# Patient Record
Sex: MALE | Race: WHITE | NOT HISPANIC OR LATINO | Employment: OTHER | URBAN - METROPOLITAN AREA
[De-identification: names, ages, dates, MRNs, and addresses within clinical notes are randomized per-mention and may not be internally consistent; named-entity substitution may affect disease eponyms.]

---

## 2017-05-23 ENCOUNTER — OFFICE VISIT (OUTPATIENT)
Dept: CARDIOLOGY | Facility: CLINIC | Age: 42
End: 2017-05-23

## 2017-05-23 ENCOUNTER — HOSPITAL ENCOUNTER (OUTPATIENT)
Dept: CARDIOLOGY | Facility: HOSPITAL | Age: 42
Discharge: HOME | End: 2017-05-23
Attending: INTERNAL MEDICINE | Admitting: INTERNAL MEDICINE

## 2017-05-23 VITALS
RESPIRATION RATE: 20 BRPM | HEART RATE: 64 BPM | WEIGHT: 237 LBS | BODY MASS INDEX: 33.18 KG/M2 | DIASTOLIC BLOOD PRESSURE: 94 MMHG | SYSTOLIC BLOOD PRESSURE: 132 MMHG | HEIGHT: 71 IN

## 2017-05-23 DIAGNOSIS — Z82.49 FAMILY HISTORY OF CORONARY ARTERY DISEASE: ICD-10-CM

## 2017-05-23 DIAGNOSIS — I10 ESSENTIAL HYPERTENSION: ICD-10-CM

## 2017-05-23 DIAGNOSIS — R07.2 PRECORDIAL PAIN: ICD-10-CM

## 2017-05-23 DIAGNOSIS — R07.2 PRECORDIAL PAIN: Primary | ICD-10-CM

## 2017-05-23 LAB
BH CV STRESS BP STAGE 1: NORMAL
BH CV STRESS BP STAGE 2: NORMAL
BH CV STRESS BP STAGE 3: NORMAL
BH CV STRESS DURATION MIN STAGE 1: 3
BH CV STRESS DURATION MIN STAGE 2: 3
BH CV STRESS DURATION MIN STAGE 3: 2
BH CV STRESS DURATION SEC STAGE 1: 0
BH CV STRESS DURATION SEC STAGE 2: 0
BH CV STRESS DURATION SEC STAGE 3: 20
BH CV STRESS GRADE STAGE 1: 10
BH CV STRESS GRADE STAGE 2: 12
BH CV STRESS GRADE STAGE 3: 14
BH CV STRESS HR STAGE 1: 101
BH CV STRESS HR STAGE 2: 117
BH CV STRESS HR STAGE 3: 137
BH CV STRESS METS STAGE 1: 5
BH CV STRESS METS STAGE 2: 7.5
BH CV STRESS METS STAGE 3: 10
BH CV STRESS O2 STAGE 3: 99
BH CV STRESS PROTOCOL 1: NORMAL
BH CV STRESS RECOVERY BP: NORMAL MMHG
BH CV STRESS RECOVERY HR: 83 BPM
BH CV STRESS SPEED STAGE 1: 1.7
BH CV STRESS SPEED STAGE 2: 2.5
BH CV STRESS SPEED STAGE 3: 3.4
BH CV STRESS STAGE 1: 1
BH CV STRESS STAGE 2: 2
BH CV STRESS STAGE 3: 3
MAXIMAL PREDICTED HEART RATE: 179 BPM
PERCENT MAX PREDICTED HR: 76.54 %
STRESS BASELINE BP: NORMAL MMHG
STRESS BASELINE HR: 76 BPM
STRESS PERCENT HR: 90 %
STRESS POST ESTIMATED WORKLOAD: 9 METS
STRESS POST EXERCISE DUR MIN: 8 MIN
STRESS POST EXERCISE DUR SEC: 20 SEC
STRESS POST PEAK BP: NORMAL MMHG
STRESS POST PEAK HR: 137 BPM
STRESS TARGET HR: 152 BPM

## 2017-05-23 PROCEDURE — 99204 OFFICE O/P NEW MOD 45 MIN: CPT | Performed by: INTERNAL MEDICINE

## 2017-05-23 PROCEDURE — 93018 CV STRESS TEST I&R ONLY: CPT | Performed by: INTERNAL MEDICINE

## 2017-05-23 PROCEDURE — 93000 ELECTROCARDIOGRAM COMPLETE: CPT | Performed by: INTERNAL MEDICINE

## 2017-05-23 PROCEDURE — 93016 CV STRESS TEST SUPVJ ONLY: CPT | Performed by: INTERNAL MEDICINE

## 2017-05-23 PROCEDURE — 93017 CV STRESS TEST TRACING ONLY: CPT

## 2017-05-23 RX ORDER — ASPIRIN 325 MG
325 TABLET ORAL DAILY
COMMUNITY
End: 2018-07-03

## 2017-05-23 RX ORDER — OMEPRAZOLE 20 MG/1
20 CAPSULE, DELAYED RELEASE ORAL 2 TIMES DAILY
COMMUNITY
End: 2021-10-26

## 2017-05-23 RX ORDER — NITROGLYCERIN 0.4 MG/1
0.4 TABLET SUBLINGUAL
COMMUNITY
End: 2022-07-22 | Stop reason: HOSPADM

## 2017-05-23 RX ORDER — HYDROCODONE BITARTRATE AND ACETAMINOPHEN 5; 325 MG/1; MG/1
1 TABLET ORAL 2 TIMES DAILY PRN
COMMUNITY
End: 2018-07-03

## 2017-05-23 RX ORDER — TESTOSTERONE 16.2 MG/G
GEL TRANSDERMAL 2 TIMES DAILY
COMMUNITY
End: 2022-06-29 | Stop reason: SDUPTHER

## 2017-05-23 RX ORDER — SIMVASTATIN 40 MG
40 TABLET ORAL NIGHTLY
COMMUNITY
End: 2018-07-03

## 2017-05-23 RX ORDER — CHLORAL HYDRATE 500 MG
2000 CAPSULE ORAL 2 TIMES DAILY WITH MEALS
COMMUNITY
End: 2022-09-19 | Stop reason: ALTCHOICE

## 2017-05-30 ENCOUNTER — HOSPITAL ENCOUNTER (OUTPATIENT)
Dept: CARDIOLOGY | Facility: HOSPITAL | Age: 42
Discharge: HOME | End: 2017-05-30
Attending: INTERNAL MEDICINE | Admitting: INTERNAL MEDICINE

## 2017-05-30 DIAGNOSIS — I10 ESSENTIAL HYPERTENSION: ICD-10-CM

## 2017-05-30 DIAGNOSIS — Z82.49 FAMILY HISTORY OF CORONARY ARTERY DISEASE: ICD-10-CM

## 2017-05-30 DIAGNOSIS — R07.2 PRECORDIAL PAIN: ICD-10-CM

## 2017-05-30 LAB
BH CV NUCLEAR PRIOR STUDY: 3
BH CV STRESS BP STAGE 1: NORMAL
BH CV STRESS COMMENTS STAGE 1: NORMAL
BH CV STRESS DOSE REGADENOSON STAGE 1: 0.4
BH CV STRESS DURATION MIN STAGE 1: 0
BH CV STRESS DURATION SEC STAGE 1: 15
BH CV STRESS HR STAGE 1: 103
BH CV STRESS PROTOCOL 1: NORMAL
BH CV STRESS RECOVERY BP: NORMAL MMHG
BH CV STRESS RECOVERY HR: 79 BPM
BH CV STRESS STAGE 1: 1
LV EF NUC BP: 67 %
MAXIMAL PREDICTED HEART RATE: 179 BPM
PERCENT MAX PREDICTED HR: 57.54 %
STRESS BASELINE BP: NORMAL MMHG
STRESS BASELINE HR: 64 BPM
STRESS PERCENT HR: 68 %
STRESS POST EXERCISE DUR SEC: 15 SEC
STRESS POST PEAK BP: NORMAL MMHG
STRESS POST PEAK HR: 103 BPM
STRESS TARGET HR: 152 BPM

## 2017-05-30 PROCEDURE — 0 TECHNETIUM TETROFOSMIN KIT: Performed by: INTERNAL MEDICINE

## 2017-05-30 PROCEDURE — 93016 CV STRESS TEST SUPVJ ONLY: CPT | Performed by: INTERNAL MEDICINE

## 2017-05-30 PROCEDURE — 78452 HT MUSCLE IMAGE SPECT MULT: CPT

## 2017-05-30 PROCEDURE — 93018 CV STRESS TEST I&R ONLY: CPT | Performed by: INTERNAL MEDICINE

## 2017-05-30 PROCEDURE — 93017 CV STRESS TEST TRACING ONLY: CPT

## 2017-05-30 PROCEDURE — 78452 HT MUSCLE IMAGE SPECT MULT: CPT | Performed by: INTERNAL MEDICINE

## 2017-05-30 PROCEDURE — A9502 TC99M TETROFOSMIN: HCPCS | Performed by: INTERNAL MEDICINE

## 2017-05-30 PROCEDURE — 25010000002 REGADENOSON 0.4 MG/5ML SOLUTION: Performed by: INTERNAL MEDICINE

## 2017-05-30 RX ADMIN — TETROFOSMIN 1 DOSE: 1.38 INJECTION, POWDER, LYOPHILIZED, FOR SOLUTION INTRAVENOUS at 12:25

## 2017-05-30 RX ADMIN — REGADENOSON 0.4 MG: 0.08 INJECTION, SOLUTION INTRAVENOUS at 12:20

## 2017-05-30 RX ADMIN — TETROFOSMIN 1 DOSE: 1.38 INJECTION, POWDER, LYOPHILIZED, FOR SOLUTION INTRAVENOUS at 11:30

## 2017-06-20 ENCOUNTER — OFFICE VISIT (OUTPATIENT)
Dept: CARDIOLOGY | Facility: CLINIC | Age: 42
End: 2017-06-20

## 2017-06-20 VITALS
HEART RATE: 72 BPM | BODY MASS INDEX: 32.34 KG/M2 | SYSTOLIC BLOOD PRESSURE: 112 MMHG | DIASTOLIC BLOOD PRESSURE: 82 MMHG | WEIGHT: 231 LBS | HEIGHT: 71 IN

## 2017-06-20 DIAGNOSIS — E78.1 HYPERTRIGLYCERIDEMIA: ICD-10-CM

## 2017-06-20 DIAGNOSIS — Z82.49 FAMILY HISTORY OF CORONARY ARTERY DISEASE: ICD-10-CM

## 2017-06-20 DIAGNOSIS — R07.2 PRECORDIAL PAIN: Primary | ICD-10-CM

## 2017-06-20 PROCEDURE — 99213 OFFICE O/P EST LOW 20 MIN: CPT | Performed by: INTERNAL MEDICINE

## 2017-06-22 NOTE — PROGRESS NOTES
Date of Office Visit: 2017  Encounter Provider: Dayron Adames MD  Place of Service: [facility] CARDIOLOGY  Patient Name: Heber Harley  :1975    Chief complaint: Follow-up for chest pain, hypertriglyceridemia, family history   of CAD.    History of Present Illness:    Dear Dr. Flores:    I again had the pleasure of seeing your patient in cardiology office on 2017.  As you   well know, he is a very pleasant 42 year-old white male with a past medical history   significant for hypertension, hypertriglyceridemia, and a family history of coronary artery   disease who presents for follow-up.  The patient initially saw me on 2017.  He had   been having approximately 6 weeks of pressure in the center of his chest radiating   across his precordium and into his left arm occasionally.  He did state that it occurred   fairly randomly, but was a clear change from his baseline.  He has a significant family   history of coronary artery disease with his father having an MI and bypass surgery in   his upper 50s, and his mother having a stent at an unknown age.  He also has a   significant history of hypertriglyceridemia, with a level of 660 on 2017.  However, he   had been off of his Zocor and fenofibrate at the time, and subsequently restarted these.   He initially underwent an exercise treadmill stress test on 2017, although he only   achieved 76% of his target heart rate secondary to fatigue and shortness of breath (he   exercised for 8 minutes).  He then underwent a Lexiscan Myoview stress test on   2017 which was normal with no evidence of ischemia, and an ejection fraction of   67%.    The patient presents today for follow-up.  Overall, he is doing fairly well.  He actually was   placed on omeprazole twice a day, and his chest pain has almost completely resolved.    At this point, he feels that it was likely secondary to reflux.  He has had no new  exertional   symptoms.  He continues to take aspirin and his lipid lowering medications.    Past Medical History:   Diagnosis Date   • GERD (gastroesophageal reflux disease)    • Hyperlipidemia    • Hypertension    • Hypertriglyceridemia    • Sleep apnea     On CPAP       Past Surgical History:   Procedure Laterality Date   • ANKLE SURGERY         Current Outpatient Prescriptions on File Prior to Visit   Medication Sig Dispense Refill   • aspirin 325 MG tablet Take 325 mg by mouth Daily.     • fenofibrate 160 MG tablet Take  by mouth Daily.     • HYDROcodone-acetaminophen (NORCO) 5-325 MG per tablet Take 1 tablet by mouth 2 (Two) Times a Day As Needed.     • lisinopril (PRINIVIL,ZESTRIL) 10 MG tablet Take  by mouth Daily.     • nitroglycerin (NITROSTAT) 0.4 MG SL tablet Place 0.4 mg under the tongue Every 5 (Five) Minutes As Needed for Chest Pain. Take no more than 3 doses in 15 minutes.     • Omega-3 Fatty Acids (FISH OIL) 1000 MG capsule capsule Take 2,000 mg by mouth 2 (Two) Times a Day With Meals.     • omeprazole (priLOSEC) 20 MG capsule Take 20 mg by mouth 2 (Two) Times a Day.     • simvastatin (ZOCOR) 40 MG tablet Take 40 mg by mouth Every Night.     • Testosterone (ANDROGEL PUMP) 20.25 MG/ACT (1.62%) gel Place  on the skin 2 (Two) Times a Day.     • Testosterone 30 MG/ACT solution 1 PUMP ON EACH AXILLA DAILY 90 mL 5     No current facility-administered medications on file prior to visit.      Allergies as of 06/20/2017   • (No Known Allergies)     Social History     Social History   • Marital status:      Spouse name: N/A   • Number of children: N/A   • Years of education: N/A     Occupational History   • Not on file.     Social History Main Topics   • Smoking status: Former Smoker     Years: 5.00     Quit date: 2015   • Smokeless tobacco: Current User      Comment: Caffeine - 2 daily   • Alcohol use 7.2 oz/week     12 Cans of beer per week   • Drug use: 7.00 per week     Special: Marijuana       "Comment: daily   • Sexual activity: Not on file     Other Topics Concern   • Not on file     Social History Narrative     Family History   Problem Relation Age of Onset   • Hypertension Mother    • Coronary artery disease Mother      Mother with stent   • Heart disease Father      Father with MI and 3 vessel CABG in upper 50's   • Hypertension Father    • Diabetes Maternal Uncle    • Cancer Maternal Uncle    • Cancer Paternal Grandfather        Review of Systems   Constitution: Positive for malaise/fatigue.   Cardiovascular: Positive for dyspnea on exertion.   Respiratory: Positive for snoring.    All other systems reviewed and are negative.    Objective:     Vitals:    06/20/17 1049   BP: 112/82   Pulse: 72   Weight: 231 lb (105 kg)   Height: 71\" (180.3 cm)     Body mass index is 32.22 kg/(m^2).    Physical Exam   Constitutional: He is oriented to person, place, and time. He appears well-developed and well-nourished.   HENT:   Head: Normocephalic and atraumatic.   Eyes: Conjunctivae are normal.   Neck: Neck supple.   Cardiovascular: Normal rate and regular rhythm.  Exam reveals no gallop and no friction rub.    No murmur heard.  Pulmonary/Chest: Effort normal and breath sounds normal.   Abdominal: Soft. There is no tenderness.   Musculoskeletal: He exhibits no edema.   Neurological: He is alert and oriented to person, place, and time.   Skin: Skin is warm.   Psychiatric: He has a normal mood and affect. His behavior is normal.     Lab Review:   Procedures    Cardiac Procedures:  1.  Exercise treadmill stress test on 5/23/2017: The patient exercised for 8 minutes, but   could not achieve his target heart rate secondary to fatigue and shortness of breath.  2.  Lexiscan Myoview stress test on 5/30/2017: Normal with no evidence of ischemia or   infarction.  The ejection fraction was 67%.    Assessment:       Diagnosis Plan   1. Precordial pain     2. Hypertriglyceridemia     3. Family history of coronary artery disease "       Plan:       As noted, the patient's chest discomfort has almost completely resolved since the initiation   of omeprazole twice a day.  He very much feels like this was a reflux related issue.  The   nuclear stress test showed no evidence of ischemia.  However, he does have a significant   family history of coronary artery disease, as well as significant hypertriglyceridemia.  I do   feel the risk factor modification is going to be very important for him.  He is going to continue   on aspirin, as well as fenofibrate and Zocor.  He does have his lipid panel checked   periodically.  His blood pressure is excellent 112/82, and he will remain on the lisinopril as   well.  I will see him back in the office in one year unless other issues arise.

## 2018-07-03 ENCOUNTER — APPOINTMENT (OUTPATIENT)
Dept: GENERAL RADIOLOGY | Facility: HOSPITAL | Age: 43
End: 2018-07-03

## 2018-07-03 ENCOUNTER — HOSPITAL ENCOUNTER (EMERGENCY)
Facility: HOSPITAL | Age: 43
Discharge: HOME | End: 2018-07-03
Attending: EMERGENCY MEDICINE | Admitting: EMERGENCY MEDICINE

## 2018-07-03 VITALS
WEIGHT: 315 LBS | DIASTOLIC BLOOD PRESSURE: 81 MMHG | SYSTOLIC BLOOD PRESSURE: 110 MMHG | OXYGEN SATURATION: 98 % | BODY MASS INDEX: 44.1 KG/M2 | HEIGHT: 71 IN | TEMPERATURE: 97.8 F | RESPIRATION RATE: 18 BRPM | HEART RATE: 67 BPM

## 2018-07-03 DIAGNOSIS — S61.313A LACERATION OF LEFT MIDDLE FINGER WITHOUT FOREIGN BODY WITH DAMAGE TO NAIL, INITIAL ENCOUNTER: Primary | ICD-10-CM

## 2018-07-03 PROCEDURE — 99283 EMERGENCY DEPT VISIT LOW MDM: CPT

## 2018-07-03 PROCEDURE — 90715 TDAP VACCINE 7 YRS/> IM: CPT | Performed by: NURSE PRACTITIONER

## 2018-07-03 PROCEDURE — 90471 IMMUNIZATION ADMIN: CPT | Performed by: NURSE PRACTITIONER

## 2018-07-03 PROCEDURE — 25010000002 TDAP 5-2.5-18.5 LF-MCG/0.5 SUSPENSION: Performed by: NURSE PRACTITIONER

## 2018-07-03 PROCEDURE — 73140 X-RAY EXAM OF FINGER(S): CPT

## 2018-07-03 RX ORDER — HYDROCODONE BITARTRATE AND ACETAMINOPHEN 7.5; 325 MG/1; MG/1
1 TABLET ORAL ONCE
Status: COMPLETED | OUTPATIENT
Start: 2018-07-03 | End: 2018-07-03

## 2018-07-03 RX ORDER — BUPIVACAINE HYDROCHLORIDE 5 MG/ML
10 INJECTION, SOLUTION EPIDURAL; INTRACAUDAL ONCE
Status: COMPLETED | OUTPATIENT
Start: 2018-07-03 | End: 2018-07-03

## 2018-07-03 RX ORDER — CEPHALEXIN 500 MG/1
500 CAPSULE ORAL 3 TIMES DAILY
Qty: 21 CAPSULE | Refills: 0 | Status: SHIPPED | OUTPATIENT
Start: 2018-07-03 | End: 2021-11-16 | Stop reason: SDUPTHER

## 2018-07-03 RX ADMIN — TETANUS TOXOID, REDUCED DIPHTHERIA TOXOID AND ACELLULAR PERTUSSIS VACCINE, ADSORBED 0.5 ML: 5; 2.5; 8; 8; 2.5 SUSPENSION INTRAMUSCULAR at 11:27

## 2018-07-03 RX ADMIN — HYDROCODONE BITARTRATE AND ACETAMINOPHEN 1 TABLET: 7.5; 325 TABLET ORAL at 10:54

## 2018-07-03 RX ADMIN — BUPIVACAINE HYDROCHLORIDE 10 ML: 5 INJECTION, SOLUTION EPIDURAL; INTRACAUDAL at 13:18

## 2018-07-03 NOTE — ED PROVIDER NOTES
The BHARTI and I have discussed this patient's history, physical exam, and treatment plan.  I have reviewed the documentation and personally had a face to face interaction with the patient. I affirm the documentation and agree with the treatment and plan.  The attached note describes my personal findings.    CC: Finger laceration  HPI:  Pt presents to ED c/o left 3rd finger laceration which occurred around 1000 today while pt was using a table saw. Pt admits to tingling to all his L fingers. Tetanus shot is not UTD.     PEx:  On exam, pt is alert, awake, in no acute distress, laceration of the tip of third L finger that extends to distal edge of nail, FROM of L fingers. XR showed a tiny fracture of the distal tuft of the third L finger.    Progress and Consults:  Notified pt that Tone Randolph PA-C will be performing a laceration repair and administering Tdap. Pt understands and agrees with the plan, all questions answered.      Documentation assistance provided by pidead Novak for Dr. Han.  Information recorded by the scribe was done at my direction and has been verified and validated by me.       Julio Cesar Novak  07/03/18 1235       Mayito Han MD  07/03/18 [id number]

## 2018-07-03 NOTE — ED NOTES
Tube dressing applied to repaired laceration, tolerated well     Joanna Farooq, JEM  07/03/18 4218

## 2018-07-03 NOTE — ED PROVIDER NOTES
EMERGENCY DEPARTMENT ENCOUNTER    Room Number:  33/33  Date seen:  7/3/2018  Time seen: 11:37 AM  PCP: Anthony Flores MD  Historian: Pt      HPI:  Chief complaint: Left 3rd finger  Context: Heber Harley is a 43 y.o. male who presents to the ED c/o left 3rd finger laceration earlier today when he was using a table saw. He states the rest of his fingers are tingling.          MEDICAL RECORD REVIEW         ALLERGIES  Patient has no known allergies.    PAST MEDICAL HISTORY  Active Ambulatory Problems     Diagnosis Date Noted   • No Active Ambulatory Problems     Resolved Ambulatory Problems     Diagnosis Date Noted   • No Resolved Ambulatory Problems     Past Medical History:   Diagnosis Date   • GERD (gastroesophageal reflux disease)    • Hyperlipidemia    • Hypertension    • Hypertriglyceridemia    • Sleep apnea        PAST SURGICAL HISTORY  Past Surgical History:   Procedure Laterality Date   • ANKLE SURGERY         FAMILY HISTORY  Family History   Problem Relation Age of Onset   • Hypertension Mother    • Coronary artery disease Mother         Mother with stent   • Heart disease Father         Father with MI and 3 vessel CABG in upper 50's   • Hypertension Father    • Diabetes Maternal Uncle    • Cancer Maternal Uncle    • Cancer Paternal Grandfather        SOCIAL HISTORY  Social History     Social History   • Marital status:      Spouse name: N/A   • Number of children: N/A   • Years of education: N/A     Occupational History   • Not on file.     Social History Main Topics   • Smoking status: Former Smoker     Years: 5.00     Quit date: 2015   • Smokeless tobacco: Current User      Comment: Caffeine - 2 daily   • Alcohol use 7.2 oz/week     12 Cans of beer per week   • Drug use: Yes     Frequency: 7.0 times per week     Types: Marijuana      Comment: daily   • Sexual activity: Not on file     Other Topics Concern   • Not on file     Social History Narrative   • No narrative on file           REVIEW OF  SYSTEMS  Review of Systems   Constitutional: Negative for activity change, appetite change and fever.   HENT: Negative for congestion and sore throat.    Respiratory: Negative for cough and shortness of breath.    Cardiovascular: Negative for chest pain and leg swelling.   Skin: Positive for wound (Left 3rd finger laceration). Negative for rash.   Neurological: Negative for weakness, numbness and headaches.   All other systems reviewed and are negative.          PHYSICAL EXAM  ED Triage Vitals   Temp Heart Rate Resp BP SpO2   07/03/18 1024 07/03/18 1022 07/03/18 1040 07/03/18 1041 07/03/18 1022   97.7 °F (36.5 °C) 79 20 (!) 137/110 97 %      Temp src Heart Rate Source Patient Position BP Location FiO2 (%)   07/03/18 1024 07/03/18 1022 -- -- --   Tympanic Monitor        Physical Exam   Constitutional: No distress.   HENT:   Head: Normocephalic and atraumatic.   Eyes: EOM are normal.   Neck: Normal range of motion.   Pulmonary/Chest: No respiratory distress.   Abdominal: There is no tenderness.   Musculoskeletal: He exhibits no edema.   Laceration to left middle finger with some nail involvement   Neurological: He is alert.   Skin: Skin is warm and dry.   Nursing note and vitals reviewed.        RADIOLOGY  XR Finger 2+ View Left             I ordered the above noted radiological studies and reviewed the images on the PACS system.     MEDICATIONS GIVEN IN ER  Medications   HYDROcodone-acetaminophen (NORCO) 7.5-325 MG per tablet 1 tablet (1 tablet Oral Given 7/3/18 1054)   Tdap (BOOSTRIX) injection 0.5 mL (0.5 mL Intramuscular Given 7/3/18 1127)   bupivacaine (PF) (MARCAINE) 0.5 % injection 10 mL (10 mL Injection Given by Other 7/3/18 1318)             PROCEDURES  Laceration Repair  Date/Time: 7/3/2018 12:36 PM  Performed by: KRISS LAW  Authorized by: ARPIT GOMEZ     Consent:     Consent obtained:  Verbal    Consent given by:  Patient  Anesthesia (see MAR for exact dosages):     Anesthesia method:  Nerve  block    Block location:  Left 3rd finger    Block needle gauge:  27 G    Block anesthetic:  Bupivacaine 0.5% w/o epi    Block injection procedure:  Anatomic landmarks identified and anatomic landmarks palpated    Block outcome:  Anesthesia achieved  Laceration details:     Location:  Finger    Finger location:  L long finger    Length (cm):  3.1  Repair type:     Repair type:  Intermediate  Pre-procedure details:     Preparation:  Patient was prepped and draped in usual sterile fashion  Exploration:     Hemostasis achieved with:  Direct pressure    Wound exploration: wound explored through full range of motion    Treatment:     Area cleansed with:  Hibiclens and saline    Amount of cleaning:  Standard    Irrigation solution:  Sterile saline and sterile water    Irrigation volume:  Moderate    Irrigation method:  Syringe  Skin repair:     Repair method:  Sutures    Suture size:  5-0    Suture material:  Nylon    Suture technique:  Simple interrupted    Number of sutures:  9  Approximation:     Approximation:  Close  Post-procedure details:     Dressing:  Antibiotic ointment, adhesive bandage and sterile dressing    Patient tolerance of procedure:  Tolerated well, no immediate complications            COURSE & MEDICAL DECISION MAKING  Pertinent Labs and Imaging studies that were ordered and reviewed are noted above.  Results were reviewed/discussed with the patient and they were also made aware of online assess.  Pt also made aware that some labs, such as cultures, will not be resulted during ER visit and follow up with PMD is necessary.         PROGRESS AND CONSULTS    Progress Notes:    ED Course as of Jul 03 1917   Tue Jul 03, 2018   1050 Pt cut left middle finger on table saw PTA.  Pt unsure of when last Tetanus shot was. R hand dominant  Exam: laceration to tip of left middle finger. Normal flexion & extension. Normal cap refill and sensation. Left radial pulse 2+.  Bleeding controled at this time.  [MS]      ED  "Course User Index  [MS] Marisol Haynespaty, APRN       1255   Reviewed pt's history and workup with Dr. Han (ER physician).  After a bedside evaluation, Dr. Han agrees with the plan of care    1305  BP- (!) 137/110 HR- 79 Temp- 97.7 °F (36.5 °C) (Tympanic) O2 sat- 97%  Rechecked the patient who is in NAD and is resting comfortably. Informed pt of successful laceeration repair and the plan for discharge. I informed pt that flap may or may not take and that he is to follow up with (Hand surgery).Pt understands and agrees with the plan, all questions answered.    1309  The patient's history, physical exam, and lab findings were discussed with the physician, who also performed a face to face history and physical exam.  I discussed all results and noted any abnormalities with patient.  Discussed absoute need to recheck abnormalities with their family physician.  I answered any of the patient's questions.  Discussed plan for discharge, as there is no emergent indication for admission.  Pt is agreeable and understands need for follow up and repeat testing.  Pt is aware that discharge does not mean that nothing is wrong but it indicates no emergency is present and they must continue care with their family physician.  Pt is discharged with instructions to follow up with primary care doctor to have their blood pressure rechecked.           Disposition vitals:  /81 (BP Location: Right arm, Patient Position: Sitting)   Pulse 67   Temp 97.8 °F (36.6 °C) (Oral)   Resp 18   Ht 180.3 cm (71\")   Wt (!) 147 kg (325 lb)   SpO2 98%   BMI 45.33 kg/m²         DIAGNOSIS  Final diagnoses:   Laceration of left middle finger without foreign body with damage to nail, initial encounter         DISPOSITION  DISCHARGE    Patient discharged in stable condition.    Reviewed implications of results, diagnosis, meds, responsibility to follow up, warning signs and symptoms of possible worsening, potential " complications and reasons to return to ER.    Patient/Family voiced understanding of above instructions.    Discussed plan for discharge, as there is no emergent indication for admission. Patient referred to primary care provider for BP management due to today's BP. Pt/family is agreeable and understands need for follow up and repeat testing.  Pt is aware that discharge does not mean that nothing is wrong but it indicates no emergency is present that requires admission and they must continue care with follow-up as given below or physician of their choice.     FOLLOW-UP  Kevan Collins MD  [address] [address]  [phone]    Call   for suture removal in 10-14 days         Medication List      New Prescriptions    cephalexin 500 MG capsule  Commonly known as:  KEFLEX  Take 1 capsule by mouth 3 (Three) Times a Day.        Stop    aspirin 325 MG tablet     HYDROcodone-acetaminophen 5-325 MG per tablet  Commonly known as:  NORCO     simvastatin 40 MG tablet  Commonly known as:  ZOCOR                FOLLOW UP   Kevan Collins MD  [address] [address]  [phone]    Call   for suture removal in 10-14 days          RX     Medication List      New Prescriptions    cephalexin 500 MG capsule  Commonly known as:  KEFLEX  Take 1 capsule by mouth 3 (Three) Times a Day.        Stop    aspirin 325 MG tablet     HYDROcodone-acetaminophen 5-325 MG per tablet  Commonly known as:  NORCO     simvastatin 40 MG tablet  Commonly known as:  ZOCOR            Documentation assistance provided by piedad Wooten for Tone Randolph PA-C.  Information recorded by the scribe was done at my direction and has been verified and validated by me.             Lito Wooten  07/03/18 1310       RAVI Nicole  07/03/18 5207

## 2018-08-05 ENCOUNTER — HOSPITAL ENCOUNTER (EMERGENCY)
Facility: HOSPITAL | Age: 43
Discharge: HOME | End: 2018-08-05
Attending: EMERGENCY MEDICINE | Admitting: EMERGENCY MEDICINE

## 2018-08-05 VITALS
DIASTOLIC BLOOD PRESSURE: 76 MMHG | BODY MASS INDEX: 34.07 KG/M2 | TEMPERATURE: 97.7 F | HEART RATE: 74 BPM | HEIGHT: 69 IN | RESPIRATION RATE: 16 BRPM | OXYGEN SATURATION: 97 % | SYSTOLIC BLOOD PRESSURE: 118 MMHG | WEIGHT: 230 LBS

## 2018-08-05 DIAGNOSIS — R42 LIGHTHEADEDNESS: ICD-10-CM

## 2018-08-05 DIAGNOSIS — R11.2 NON-INTRACTABLE VOMITING WITH NAUSEA, UNSPECIFIED VOMITING TYPE: Primary | ICD-10-CM

## 2018-08-05 LAB
ALBUMIN SERPL-MCNC: 4.9 G/DL (ref 3.5–5.2)
ALBUMIN/GLOB SERPL: 2 G/DL
ALP SERPL-CCNC: 54 U/L (ref 39–117)
ALT SERPL W P-5'-P-CCNC: 98 U/L (ref 1–41)
ANION GAP SERPL CALCULATED.3IONS-SCNC: 14.1 MMOL/L
AST SERPL-CCNC: 59 U/L (ref 1–40)
BASOPHILS # BLD AUTO: 0.05 10*3/MM3 (ref 0–0.2)
BASOPHILS NFR BLD AUTO: 0.5 % (ref 0–1.5)
BILIRUB SERPL-MCNC: 0.5 MG/DL (ref 0.1–1.2)
BILIRUB UR QL STRIP: NEGATIVE
BUN BLD-MCNC: 8 MG/DL (ref 6–20)
BUN/CREAT SERPL: 6.9 (ref 7–25)
CALCIUM SPEC-SCNC: 9.8 MG/DL (ref 8.6–10.5)
CHLORIDE SERPL-SCNC: 104 MMOL/L (ref 98–107)
CLARITY UR: ABNORMAL
CO2 SERPL-SCNC: 25.9 MMOL/L (ref 22–29)
COLOR UR: YELLOW
CREAT BLD-MCNC: 1.16 MG/DL (ref 0.76–1.27)
DEPRECATED RDW RBC AUTO: 43.4 FL (ref 37–54)
EOSINOPHIL # BLD AUTO: 0.16 10*3/MM3 (ref 0–0.7)
EOSINOPHIL NFR BLD AUTO: 1.6 % (ref 0.3–6.2)
ERYTHROCYTE [DISTWIDTH] IN BLOOD BY AUTOMATED COUNT: 13 % (ref 11.5–14.5)
GFR SERPL CREATININE-BSD FRML MDRD: 69 ML/MIN/1.73
GLOBULIN UR ELPH-MCNC: 2.5 GM/DL
GLUCOSE BLD-MCNC: 107 MG/DL (ref 65–99)
GLUCOSE UR STRIP-MCNC: NEGATIVE MG/DL
HCT VFR BLD AUTO: 46.5 % (ref 40.4–52.2)
HGB BLD-MCNC: 15.7 G/DL (ref 13.7–17.6)
HGB UR QL STRIP.AUTO: NEGATIVE
IMM GRANULOCYTES # BLD: 0.03 10*3/MM3 (ref 0–0.03)
IMM GRANULOCYTES NFR BLD: 0.3 % (ref 0–0.5)
KETONES UR QL STRIP: NEGATIVE
LEUKOCYTE ESTERASE UR QL STRIP.AUTO: NEGATIVE
LIPASE SERPL-CCNC: 21 U/L (ref 13–60)
LYMPHOCYTES # BLD AUTO: 1.23 10*3/MM3 (ref 0.9–4.8)
LYMPHOCYTES NFR BLD AUTO: 12.5 % (ref 19.6–45.3)
MCH RBC QN AUTO: 30.8 PG (ref 27–32.7)
MCHC RBC AUTO-ENTMCNC: 33.8 G/DL (ref 32.6–36.4)
MCV RBC AUTO: 91.2 FL (ref 79.8–96.2)
MONOCYTES # BLD AUTO: 0.72 10*3/MM3 (ref 0.2–1.2)
MONOCYTES NFR BLD AUTO: 7.3 % (ref 5–12)
NEUTROPHILS # BLD AUTO: 7.71 10*3/MM3 (ref 1.9–8.1)
NEUTROPHILS NFR BLD AUTO: 78.1 % (ref 42.7–76)
NITRITE UR QL STRIP: NEGATIVE
PH UR STRIP.AUTO: >=9 [PH] (ref 5–8)
PLATELET # BLD AUTO: 241 10*3/MM3 (ref 140–500)
PMV BLD AUTO: 10.9 FL (ref 6–12)
POTASSIUM BLD-SCNC: 4.4 MMOL/L (ref 3.5–5.2)
PROT SERPL-MCNC: 7.4 G/DL (ref 6–8.5)
PROT UR QL STRIP: NEGATIVE
RBC # BLD AUTO: 5.1 10*6/MM3 (ref 4.6–6)
SODIUM BLD-SCNC: 144 MMOL/L (ref 136–145)
SP GR UR STRIP: 1.02 (ref 1–1.03)
UROBILINOGEN UR QL STRIP: ABNORMAL
WBC NRBC COR # BLD: 9.87 10*3/MM3 (ref 4.5–10.7)

## 2018-08-05 PROCEDURE — 96361 HYDRATE IV INFUSION ADD-ON: CPT

## 2018-08-05 PROCEDURE — 96374 THER/PROPH/DIAG INJ IV PUSH: CPT

## 2018-08-05 PROCEDURE — 83690 ASSAY OF LIPASE: CPT | Performed by: EMERGENCY MEDICINE

## 2018-08-05 PROCEDURE — 81003 URINALYSIS AUTO W/O SCOPE: CPT | Performed by: EMERGENCY MEDICINE

## 2018-08-05 PROCEDURE — 85025 COMPLETE CBC W/AUTO DIFF WBC: CPT | Performed by: EMERGENCY MEDICINE

## 2018-08-05 PROCEDURE — 25010000002 ONDANSETRON PER 1 MG: Performed by: EMERGENCY MEDICINE

## 2018-08-05 PROCEDURE — 80053 COMPREHEN METABOLIC PANEL: CPT | Performed by: EMERGENCY MEDICINE

## 2018-08-05 PROCEDURE — 99284 EMERGENCY DEPT VISIT MOD MDM: CPT

## 2018-08-05 RX ORDER — ONDANSETRON 2 MG/ML
4 INJECTION INTRAMUSCULAR; INTRAVENOUS ONCE
Status: COMPLETED | OUTPATIENT
Start: 2018-08-05 | End: 2018-08-05

## 2018-08-05 RX ORDER — ONDANSETRON 4 MG/1
4 TABLET, ORALLY DISINTEGRATING ORAL EVERY 8 HOURS PRN
Qty: 15 TABLET | Refills: 0 | Status: SHIPPED | OUTPATIENT
Start: 2018-08-05 | End: 2018-08-10

## 2018-08-05 RX ORDER — SODIUM CHLORIDE 0.9 % (FLUSH) 0.9 %
10 SYRINGE (ML) INJECTION AS NEEDED
Status: DISCONTINUED | OUTPATIENT
Start: 2018-08-05 | End: 2018-08-05 | Stop reason: HOSPADM

## 2018-08-05 RX ADMIN — ONDANSETRON HYDROCHLORIDE 4 MG: 2 INJECTION, SOLUTION INTRAMUSCULAR; INTRAVENOUS at 14:09

## 2018-08-05 RX ADMIN — SODIUM CHLORIDE, POTASSIUM CHLORIDE, SODIUM LACTATE AND CALCIUM CHLORIDE 1000 ML: 600; 310; 30; 20 INJECTION, SOLUTION INTRAVENOUS at 14:09

## 2018-08-05 NOTE — ED PROVIDER NOTES
EMERGENCY DEPARTMENT ENCOUNTER    Room Number:  27/27  Date seen:  8/5/2018  Time seen: 1:44 PM  PCP: Anthony Flores MD  Historian: Pt      HPI:  Chief Complaint: Vomiting  A complete HPI/ROS/PMH/PSH/SH/FH are unobtainable due to: n/a  Context: Heber Harley is a 43 y.o. male who presents to the ED c/o vomiting that began 2 hrs ago. Pt c/o dizziness when standing and lower abd pain. Pt describes dizziness as wobbly and lightheaded. Lightheadedness occurs only with standing.  Pt denies hematemesis, fever, cough, CP, SOA, diarrhea, constipation, and dysuria. Pt has h/o HTN, hyperlipidemia, and sleep apnea.     Quality: vomiting  Intensity/Severity: moderate  Duration: since 2hrs ago  Timing: intermittent  Progression: worsening  Treatment before arrival: none stated  Associated Symptoms: dizziness and lower abd pain    PAST MEDICAL HISTORY  Active Ambulatory Problems     Diagnosis Date Noted   • No Active Ambulatory Problems     Resolved Ambulatory Problems     Diagnosis Date Noted   • No Resolved Ambulatory Problems     Past Medical History:   Diagnosis Date   • GERD (gastroesophageal reflux disease)    • Hyperlipidemia    • Hypertension    • Hypertriglyceridemia    • Sleep apnea          PAST SURGICAL HISTORY  Past Surgical History:   Procedure Laterality Date   • ANKLE SURGERY           FAMILY HISTORY  Family History   Problem Relation Age of Onset   • Hypertension Mother    • Coronary artery disease Mother         Mother with stent   • Heart disease Father         Father with MI and 3 vessel CABG in upper 50's   • Hypertension Father    • Diabetes Maternal Uncle    • Cancer Maternal Uncle    • Cancer Paternal Grandfather          SOCIAL HISTORY  Social History     Social History   • Marital status:      Spouse name: N/A   • Number of children: N/A   • Years of education: N/A     Occupational History   • Not on file.     Social History Main Topics   • Smoking status: Former Smoker     Years: 5.00      Quit date: 2015   • Smokeless tobacco: Current User      Comment: Caffeine - 2 daily   • Alcohol use 7.2 oz/week     12 Cans of beer per week   • Drug use: Yes     Frequency: 7.0 times per week     Types: Marijuana      Comment: daily   • Sexual activity: Not on file     Other Topics Concern   • Not on file     Social History Narrative   • No narrative on file         ALLERGIES  Patient has no known allergies.        REVIEW OF SYSTEMS  Review of Systems   Constitutional: Negative for fever.   HENT: Negative for sore throat.    Respiratory: Negative for cough and shortness of breath.    Cardiovascular: Negative for chest pain.   Gastrointestinal: Positive for abdominal pain (lower) and vomiting (denies hematemesis). Negative for constipation and diarrhea.   Endocrine: Negative for polyuria.   Genitourinary: Negative for dysuria.   Musculoskeletal: Negative for neck pain.   Skin: Negative for rash.   Neurological: Positive for dizziness (wobby and lightheaded). Negative for headaches.   All other systems reviewed and are negative.           PHYSICAL EXAM  ED Triage Vitals   Temp Heart Rate Resp BP SpO2   08/05/18 1317 08/05/18 1317 08/05/18 1317 08/05/18 1332 08/05/18 1317   97.5 °F (36.4 °C) 85 16 122/95 100 %      Temp src Heart Rate Source Patient Position BP Location FiO2 (%)   08/05/18 1317 -- -- -- --   Tympanic             GENERAL: uncomfortable, nontoxic  HENT: nares patent, mucous membranes slightly dry  EYES: no scleral icterus  CV: regular rhythm, regular rate  RESPIRATORY: normal effort, CTAB  ABDOMEN: soft, no CVA tenderness, lower abd tenderness, no rebound or guarding  MUSCULOSKELETAL: no deformity  NEURO:   Recent and remote memory functions are normal. The patient is attentive with normal concentration. Language is fluent. Speech is clear. The speech is non-dysarthric. Fund of knowledge is normal.   Symmetric smile with no facial droop.  Eyes close shut strongly bilaterally.  Symmetric eyebrow raise  bilaterally.  EOMI, PERRL  CN II-XII grossly normal otherwise.  5/5 strength to extremities.  No pronator drift.  Intact FNF.  No meningismus.     SKIN: warm, dry    Vital signs and nursing notes reviewed.          LAB RESULTS  Recent Results (from the past 24 hour(s))   Comprehensive Metabolic Panel    Collection Time: 08/05/18  2:08 PM   Result Value Ref Range    Glucose 107 (H) 65 - 99 mg/dL    BUN 8 6 - 20 mg/dL    Creatinine 1.16 0.76 - 1.27 mg/dL    Sodium 144 136 - 145 mmol/L    Potassium 4.4 3.5 - 5.2 mmol/L    Chloride 104 98 - 107 mmol/L    CO2 25.9 22.0 - 29.0 mmol/L    Calcium 9.8 8.6 - 10.5 mg/dL    Total Protein 7.4 6.0 - 8.5 g/dL    Albumin 4.90 3.50 - 5.20 g/dL    ALT (SGPT) 98 (H) 1 - 41 U/L    AST (SGOT) 59 (H) 1 - 40 U/L    Alkaline Phosphatase 54 39 - 117 U/L    Total Bilirubin 0.5 0.1 - 1.2 mg/dL    eGFR Non African Amer 69 >60 mL/min/1.73    Globulin 2.5 gm/dL    A/G Ratio 2.0 g/dL    BUN/Creatinine Ratio 6.9 (L) 7.0 - 25.0    Anion Gap 14.1 mmol/L   Lipase    Collection Time: 08/05/18  2:08 PM   Result Value Ref Range    Lipase 21 13 - 60 U/L   CBC Auto Differential    Collection Time: 08/05/18  2:08 PM   Result Value Ref Range    WBC 9.87 4.50 - 10.70 10*3/mm3    RBC 5.10 4.60 - 6.00 10*6/mm3    Hemoglobin 15.7 13.7 - 17.6 g/dL    Hematocrit 46.5 40.4 - 52.2 %    MCV 91.2 79.8 - 96.2 fL    MCH 30.8 27.0 - 32.7 pg    MCHC 33.8 32.6 - 36.4 g/dL    RDW 13.0 11.5 - 14.5 %    RDW-SD 43.4 37.0 - 54.0 fl    MPV 10.9 6.0 - 12.0 fL    Platelets 241 140 - 500 10*3/mm3    Neutrophil % 78.1 (H) 42.7 - 76.0 %    Lymphocyte % 12.5 (L) 19.6 - 45.3 %    Monocyte % 7.3 5.0 - 12.0 %    Eosinophil % 1.6 0.3 - 6.2 %    Basophil % 0.5 0.0 - 1.5 %    Immature Grans % 0.3 0.0 - 0.5 %    Neutrophils, Absolute 7.71 1.90 - 8.10 10*3/mm3    Lymphocytes, Absolute 1.23 0.90 - 4.80 10*3/mm3    Monocytes, Absolute 0.72 0.20 - 1.20 10*3/mm3    Eosinophils, Absolute 0.16 0.00 - 0.70 10*3/mm3    Basophils, Absolute 0.05 0.00  - 0.20 10*3/mm3    Immature Grans, Absolute 0.03 0.00 - 0.03 10*3/mm3   Urinalysis With Microscopic If Indicated (No Culture) - Urine, Clean Catch    Collection Time: 08/05/18  3:23 PM   Result Value Ref Range    Color, UA Yellow Yellow, Straw    Appearance, UA Cloudy (A) Clear    pH, UA >=9.0 (H) 5.0 - 8.0    Specific Gravity, UA 1.018 1.005 - 1.030    Glucose, UA Negative Negative    Ketones, UA Negative Negative    Bilirubin, UA Negative Negative    Blood, UA Negative Negative    Protein, UA Negative Negative    Leuk Esterase, UA Negative Negative    Nitrite, UA Negative Negative    Urobilinogen, UA 0.2 E.U./dL 0.2 - 1.0 E.U./dL       Ordered the above labs and reviewed the results.        PROCEDURES  Procedures        MEDICATIONS GIVEN IN ER  Medications   sodium chloride 0.9 % flush 10 mL (not administered)   lactated ringers bolus 1,000 mL (0 mL Intravenous Stopped 8/5/18 1625)   ondansetron (ZOFRAN) injection 4 mg (4 mg Intravenous Given 8/5/18 2399)                   PROGRESS AND CONSULTS   1:51 PM  Orthostatics, labs, and IV fluids ordered.     2:04 PM  Zofran ordered for nausea.     3:12 PM  Rechecked pt who is resting in NAD. States nausea has improved but dizziness has not. Testing to see if pt can tolerate fluids PO. Discussed plan to discharge. Pt understands and agrees with the plan, all questions answered.          MEDICAL DECISION MAKING      MDM  Number of Diagnoses or Management Options  Lightheadedness:   Non-intractable vomiting with nausea, unspecified vomiting type:   Diagnosis management comments: Pt with vomiting. DDx includes atypical ACS, pneumonia, UTI, intra-abdominal infection such as appendicitis or viral gastroenteritis. No diarrhea. Repeat abd exam prior to discharge is soft and nontender. I see no indicated for CT scan of abd.  He states he feels much better. Tolerated PO. Lungs CTAB without URI sx. I suspect viral etiology. Gave good RTER precautions.        Amount and/or  Complexity of Data Reviewed  Clinical lab tests: reviewed and ordered (WBC-9.87  Lipase-21)  Decide to obtain previous medical records or to obtain history from someone other than the patient: yes  Review and summarize past medical records: yes               DIAGNOSIS  Final diagnoses:   Non-intractable vomiting with nausea, unspecified vomiting type   Lightheadedness         DISPOSITION  DISCHARGE    Patient discharged in stable condition.    Reviewed implications of results, diagnosis, meds, responsibility to follow up, warning signs and symptoms of possible worsening, potential complications and reasons to return to ER.    Patient/Family voiced understanding of above instructions.    Discussed plan for discharge, as there is no emergent indication for admission. Patient referred to primary care provider for BP management due to today's BP. Pt/family is agreeable and understands need for follow up and repeat testing.  Pt is aware that discharge does not mean that nothing is wrong but it indicates no emergency is present that requires admission and they must continue care with follow-up as given below or physician of their choice.     FOLLOW-UP  Anthony Flores MD  [address]   [address] [address] [phone]    Call in 1 day  If symptoms worsen         Medication List      New Prescriptions    ondansetron ODT 4 MG disintegrating tablet  Commonly known as:  ZOFRAN-ODT  Take 1 tablet by mouth Every 8 (Eight) Hours As Needed for Nausea or   Vomiting for up to 5 days.                      Latest Documented Vital Signs:  As of 4:48 PM  BP- 123/84 HR- 73 Temp- 97.5 °F (36.4 °C) (Tympanic) O2 sat- 97%        --  Documentation assistance provided by pieadd Sanchez for Dr. Ye MD.  Information recorded by the scrnakita was done at my direction and has been verified and validated by me.       June Sanchez  08/05/18 3593       Kevan Belcher II, MD  08/06/18 1717

## 2021-10-12 ENCOUNTER — TELEPHONE (OUTPATIENT)
Dept: FAMILY MEDICINE CLINIC | Age: 46
End: 2021-10-12

## 2021-10-12 ENCOUNTER — PREP FOR SURGERY (OUTPATIENT)
Dept: OTHER | Facility: HOSPITAL | Age: 46
End: 2021-10-12

## 2021-10-12 ENCOUNTER — OFFICE VISIT (OUTPATIENT)
Dept: SURGERY | Facility: CLINIC | Age: 46
End: 2021-10-12

## 2021-10-12 VITALS — HEIGHT: 71 IN | WEIGHT: 253 LBS | BODY MASS INDEX: 35.42 KG/M2 | RESPIRATION RATE: 16 BRPM

## 2021-10-12 DIAGNOSIS — K64.2 GRADE III HEMORRHOIDS: Primary | ICD-10-CM

## 2021-10-12 PROCEDURE — 99203 OFFICE O/P NEW LOW 30 MIN: CPT | Performed by: SURGERY

## 2021-10-12 RX ORDER — CEFAZOLIN SODIUM 2 G/100ML
2 INJECTION, SOLUTION INTRAVENOUS ONCE
Status: CANCELLED | OUTPATIENT
Start: 2021-10-12 | End: 2021-10-12

## 2021-10-12 RX ORDER — SODIUM CHLORIDE 0.9 % (FLUSH) 0.9 %
10 SYRINGE (ML) INJECTION AS NEEDED
Status: CANCELLED | OUTPATIENT
Start: 2021-10-12

## 2021-10-12 RX ORDER — SODIUM CHLORIDE 0.9 % (FLUSH) 0.9 %
10 SYRINGE (ML) INJECTION EVERY 12 HOURS SCHEDULED
Status: CANCELLED | OUTPATIENT
Start: 2021-10-12

## 2021-10-12 RX ORDER — SODIUM CHLORIDE, SODIUM LACTATE, POTASSIUM CHLORIDE, CALCIUM CHLORIDE 600; 310; 30; 20 MG/100ML; MG/100ML; MG/100ML; MG/100ML
70 INJECTION, SOLUTION INTRAVENOUS CONTINUOUS
Status: CANCELLED | OUTPATIENT
Start: 2021-10-12

## 2021-10-12 RX ORDER — ONDANSETRON 2 MG/ML
4 INJECTION INTRAMUSCULAR; INTRAVENOUS EVERY 6 HOURS PRN
Status: CANCELLED | OUTPATIENT
Start: 2021-10-12

## 2021-10-12 NOTE — PROGRESS NOTES
Inpatient History and Physical Surgical Orders    Preadmission Location:   Preadmission Time:  Facility:  Surgery Date:  Surgery Time:  Preadmission Test date:     Chief Complaint  Outpatient History and Physical / Surgical Orders    Primary Care Provider: Deya Pack APRN    Referring Provider: No ref. provider found    Subjective      Patient Name: Heber Harley : 1975    HPI  The patient is a 46-year-old gentleman who presents with 2 months of refractory hemorrhoid discomfort.  He has used witch hazel as well as hydrocortisone suppositories without any significant improvement in his symptoms.  He does occasionally have a little bit of bleeding.    Past History:  Medical History: has a past medical history of GERD (gastroesophageal reflux disease), Hyperlipidemia, Hypertension, Hypertriglyceridemia, and Sleep apnea.   Surgical History: has a past surgical history that includes Ankle surgery.   Family History: family history includes Cancer in his maternal uncle and paternal grandfather; Coronary artery disease in his mother; Diabetes in his maternal uncle; Heart disease in his father; Hypertension in his father and mother.   Social History: reports that he quit smoking about 6 years ago. He quit after 5.00 years of use. He uses smokeless tobacco. He reports current alcohol use of about 12.0 standard drinks of alcohol per week. He reports current drug use. Frequency: 7.00 times per week. Drug: Marijuana.  Allergies: Patient has no known allergies.       Current Outpatient Medications:   •  Bempedoic Acid-Ezetimibe (Nexlizet) 180-10 MG tablet, Take 1 tablet by mouth Daily., Disp: 90 tablet, Rfl: 1  •  diclofenac (VOLTAREN) 75 MG EC tablet, Take 1 tablet by mouth 2 (two) times a day., Disp: 60 tablet, Rfl: 0  •  fenofibrate 160 MG tablet, Take  by mouth Daily., Disp: , Rfl:   •  HYDROcodone-acetaminophen (NORCO) 5-325 MG per tablet, Take 1 tablet every 12 hours by oral route as needed for 30 days.,  Disp: 60 tablet, Rfl: 0  •  lisinopril (PRINIVIL,ZESTRIL) 10 MG tablet, Take  by mouth Daily., Disp: , Rfl:   •  nitroglycerin (NITROSTAT) 0.4 MG SL tablet, Place 0.4 mg under the tongue Every 5 (Five) Minutes As Needed for Chest Pain. Take no more than 3 doses in 15 minutes., Disp: , Rfl:   •  Omega-3 Fatty Acids (FISH OIL) 1000 MG capsule capsule, Take 2,000 mg by mouth 2 (Two) Times a Day With Meals., Disp: , Rfl:   •  omeprazole (priLOSEC) 20 MG capsule, Take 1 capsule by mouth 2 (Two) Times a Day., Disp: 180 capsule, Rfl: 1  •  sildenafil (REVATIO) 20 MG tablet, Take 1-2 tablets by mouth 1 hour prior to intercourse, Disp: 30 tablet, Rfl: 2  •  terbinafine (lamiSIL) 250 MG tablet, TAKE ONE TABLET BY MOUTH DAILY, Disp: 90 tablet, Rfl: 0  •  vitamin D (ERGOCALCIFEROL) 1.25 MG (57948 UT) capsule capsule, Take 1 capsule by mouth 1 (One) Time Per Week., Disp: 12 capsule, Rfl: 3  •  Vortioxetine HBr (Trintellix) 10 MG tablet, Take 1 tablet by mouth Daily., Disp: 90 tablet, Rfl: 1  •  cephalexin (KEFLEX) 500 MG capsule, Take 1 capsule by mouth 3 (Three) Times a Day., Disp: 21 capsule, Rfl: 0  •  diclofenac (VOLTAREN) 75 MG EC tablet, Take 1 tablet by mouth 2 (Two) Times a Day As Needed., Disp: 60 tablet, Rfl: 1  •  fenofibrate 160 MG tablet, Take 1 tablet by mouth Daily., Disp: 90 tablet, Rfl: 1  •  fenofibrate 160 MG tablet, Take 1 tablet by mouth Daily., Disp: 90 tablet, Rfl: 1  •  HYDROcodone-acetaminophen (NORCO) 5-325 MG per tablet, Take 1 tablet by mouth Every 8 (Eight) Hours As Needed for pain, Disp: 90 tablet, Rfl: 0  •  HYDROcodone-acetaminophen (NORCO) 5-325 MG per tablet, Take 1 tablet by mouth Every 8 (Eight) Hours As Needed for pain, Disp: 90 tablet, Rfl: 0  •  lisinopril (PRINIVIL,ZESTRIL) 10 MG tablet, Take 1 tablet by mouth Daily., Disp: 90 tablet, Rfl: 1  •  lisinopril (PRINIVIL,ZESTRIL) 10 MG tablet, Take 1 tablet by mouth Daily., Disp: 90 tablet, Rfl: 1  •  lisinopril (PRINIVIL,ZESTRIL) 10 MG tablet,  "Take 1 tablet by mouth Daily., Disp: 90 tablet, Rfl: 1  •  omeprazole (priLOSEC) 20 MG capsule, Take 20 mg by mouth 2 (Two) Times a Day., Disp: , Rfl:   •  omeprazole (priLOSEC) 20 MG capsule, Take 1 capsule by mouth 2 (Two) Times a Day., Disp: 180 capsule, Rfl: 1  •  omeprazole (priLOSEC) 20 MG capsule, Take 1 capsule by mouth 2 (Two) Times a Day., Disp: 180 capsule, Rfl: 1  •  sildenafil (REVATIO) 20 MG tablet, Take 1-2 tablets by mouth As Needed 1 hour prior to intercourse, Disp: 30 tablet, Rfl: 2  •  Testosterone (ANDROGEL PUMP) 20.25 MG/ACT (1.62%) gel, Place  on the skin 2 (Two) Times a Day., Disp: , Rfl:   •  Testosterone 30 MG/ACT solution, 1 PUMP ON EACH AXILLA DAILY, Disp: 90 mL, Rfl: 5  •  Testosterone Cypionate (DEPOTESTOTERONE CYPIONATE) 200 MG/ML injection, Inject 1 mL into the appropriate muscle as directed by prescriber Every 14 (Fourteen) Days., Disp: 2 mL, Rfl: 0  •  Testosterone Cypionate (DEPOTESTOTERONE CYPIONATE) 200 MG/ML injection, Inject 1 mL into the appropriate muscle as directed by prescriber Every 14 (Fourteen) Days., Disp: 2 mL, Rfl: 5  •  vitamin D (ERGOCALCIFEROL) 1.25 MG (16197 UT) capsule capsule, Take 1 capsule by mouth 1 (One) Time Per Week., Disp: 12 capsule, Rfl: 3  •  Vortioxetine HBr (Trintellix) 10 MG tablet, Take 10 mg by mouth Daily., Disp: 90 tablet, Rfl: 1  •  Vortioxetine HBr (Trintellix) 10 MG tablet, Take 10 mg by mouth Daily., Disp: 90 tablet, Rfl: 1       Objective   Vital Signs:   Resp 16   Ht 180.3 cm (71\")   Wt 115 kg (253 lb)   BMI 35.29 kg/m²       Physical Exam  Vitals and nursing note reviewed.   Constitutional:       Appearance: Normal appearance. The patient is well-developed.   Cardiovascular:      Rate and Rhythm: Normal rate and regular rhythm.   Pulmonary:      Effort: Pulmonary effort is normal.      Breath sounds: Normal air entry.   Abdominal:      General: Bowel sounds are normal.      Palpations: Abdomen is soft.      Skin:     General: Skin is " warm and dry.   Neurological:      Mental Status: The patient is alert and oriented to person, place, and time.      Motor: Motor function is intact.   Psychiatric:         Mood and Affect: Mood normal.   Rectal: Hemorrhoids noted    Result Review :               Assessment and Plan   Diagnoses and all orders for this visit:    1. Grade III hemorrhoids (Primary)    We will schedule him for a hemorrhoid banding.  I have described the procedure to him as well as the risk and benefits and he is agreeable to proceeding.    I  Matthew Coronel MD  10/12/2021

## 2021-10-12 NOTE — TELEPHONE ENCOUNTER
Caller: JOHN     Relationship to patient: DR [facility] OFF[facility]     Best call back number: [phone]    Patient is needing: JOHN FROM DR LADD OFFICE CALLED STATING SHE IS NEEDING TO SCHEDULE THIS PATIENTS COVID TEST FOR PRE OP. SHE STATED IT NEEDS TO BE FOR 10/29/2021 AND PER HUB WORK FLOW WE ARE TO WARM TRANSFER FOR THAT. SHE WOULD LIKE A CALL BACK PLEASE ADVISE THANK YOU.

## 2021-10-25 ENCOUNTER — TELEPHONE (OUTPATIENT)
Dept: UROLOGY | Facility: CLINIC | Age: 46
End: 2021-10-25

## 2021-10-25 NOTE — TELEPHONE ENCOUNTER
Patient fully Covid vaccinated on 10/18.  Does he still need to have the Covid test?  He is going to be going out of town.

## 2021-10-25 NOTE — TELEPHONE ENCOUNTER
The patient called and I told him that Alisa said he doesn't need the Covid test.  He said that he assumes that means to disregard text message about appointment on 10/29.

## 2021-10-26 RX ORDER — ASPIRIN 81 MG/1
81 TABLET, CHEWABLE ORAL DAILY
COMMUNITY
End: 2022-06-29 | Stop reason: SDUPTHER

## 2021-10-29 ENCOUNTER — ANESTHESIA EVENT (OUTPATIENT)
Dept: PERIOP | Facility: HOSPITAL | Age: 46
End: 2021-10-29

## 2021-11-03 ENCOUNTER — ANESTHESIA (OUTPATIENT)
Dept: PERIOP | Facility: HOSPITAL | Age: 46
End: 2021-11-03

## 2021-11-03 ENCOUNTER — HOSPITAL ENCOUNTER (OUTPATIENT)
Facility: HOSPITAL | Age: 46
Setting detail: HOSPITAL OUTPATIENT SURGERY
Discharge: HOME | End: 2021-11-03
Attending: SURGERY | Admitting: SURGERY

## 2021-11-03 VITALS
BODY MASS INDEX: 35.49 KG/M2 | RESPIRATION RATE: 18 BRPM | SYSTOLIC BLOOD PRESSURE: 134 MMHG | HEART RATE: 76 BPM | OXYGEN SATURATION: 94 % | HEIGHT: 71 IN | WEIGHT: 253.53 LBS | DIASTOLIC BLOOD PRESSURE: 98 MMHG | TEMPERATURE: 97.1 F

## 2021-11-03 DIAGNOSIS — K64.2 GRADE III HEMORRHOIDS: ICD-10-CM

## 2021-11-03 PROCEDURE — 46221 LIGATION OF HEMORRHOID(S): CPT | Performed by: SURGERY

## 2021-11-03 PROCEDURE — 25010000002 ONDANSETRON PER 1 MG: Performed by: NURSE ANESTHETIST, CERTIFIED REGISTERED

## 2021-11-03 PROCEDURE — 25010000002 PROPOFOL 10 MG/ML EMULSION: Performed by: NURSE ANESTHETIST, CERTIFIED REGISTERED

## 2021-11-03 PROCEDURE — 25010000002 MIDAZOLAM PER 1MG: Performed by: ANESTHESIOLOGY

## 2021-11-03 PROCEDURE — 25010000002 KETOROLAC TROMETHAMINE PER 15 MG: Performed by: NURSE ANESTHETIST, CERTIFIED REGISTERED

## 2021-11-03 PROCEDURE — 0 CEFAZOLIN IN DEXTROSE 2-4 GM/100ML-% SOLUTION: Performed by: SURGERY

## 2021-11-03 PROCEDURE — 25010000002 HYDROMORPHONE 1 MG/ML SOLUTION: Performed by: SURGERY

## 2021-11-03 PROCEDURE — 25010000002 DEXAMETHASONE PER 1 MG: Performed by: NURSE ANESTHETIST, CERTIFIED REGISTERED

## 2021-11-03 PROCEDURE — 25010000002 FENTANYL CITRATE (PF) 50 MCG/ML SOLUTION: Performed by: NURSE ANESTHETIST, CERTIFIED REGISTERED

## 2021-11-03 RX ORDER — PROPOFOL 10 MG/ML
VIAL (ML) INTRAVENOUS AS NEEDED
Status: DISCONTINUED | OUTPATIENT
Start: 2021-11-03 | End: 2021-11-03 | Stop reason: SURG

## 2021-11-03 RX ORDER — CEFAZOLIN SODIUM 2 G/100ML
2 INJECTION, SOLUTION INTRAVENOUS ONCE
Status: COMPLETED | OUTPATIENT
Start: 2021-11-03 | End: 2021-11-03

## 2021-11-03 RX ORDER — LABETALOL HYDROCHLORIDE 5 MG/ML
INJECTION, SOLUTION INTRAVENOUS AS NEEDED
Status: DISCONTINUED | OUTPATIENT
Start: 2021-11-03 | End: 2021-11-03 | Stop reason: SURG

## 2021-11-03 RX ORDER — OXYCODONE HYDROCHLORIDE 5 MG/1
5 TABLET ORAL
Status: DISCONTINUED | OUTPATIENT
Start: 2021-11-03 | End: 2021-11-03 | Stop reason: HOSPADM

## 2021-11-03 RX ORDER — IBUPROFEN 600 MG/1
600 TABLET ORAL EVERY 6 HOURS PRN
Status: DISCONTINUED | OUTPATIENT
Start: 2021-11-03 | End: 2021-11-03 | Stop reason: HOSPADM

## 2021-11-03 RX ORDER — ACETAMINOPHEN 500 MG
1000 TABLET ORAL ONCE
Status: COMPLETED | OUTPATIENT
Start: 2021-11-03 | End: 2021-11-03

## 2021-11-03 RX ORDER — ONDANSETRON 4 MG/1
4 TABLET, FILM COATED ORAL ONCE AS NEEDED
Status: DISCONTINUED | OUTPATIENT
Start: 2021-11-03 | End: 2021-11-03 | Stop reason: HOSPADM

## 2021-11-03 RX ORDER — PROMETHAZINE HYDROCHLORIDE 12.5 MG/1
25 TABLET ORAL ONCE AS NEEDED
Status: DISCONTINUED | OUTPATIENT
Start: 2021-11-03 | End: 2021-11-03 | Stop reason: HOSPADM

## 2021-11-03 RX ORDER — PROMETHAZINE HYDROCHLORIDE 25 MG/1
25 SUPPOSITORY RECTAL ONCE AS NEEDED
Status: DISCONTINUED | OUTPATIENT
Start: 2021-11-03 | End: 2021-11-03 | Stop reason: HOSPADM

## 2021-11-03 RX ORDER — FENTANYL CITRATE 50 UG/ML
INJECTION, SOLUTION INTRAMUSCULAR; INTRAVENOUS AS NEEDED
Status: DISCONTINUED | OUTPATIENT
Start: 2021-11-03 | End: 2021-11-03 | Stop reason: SURG

## 2021-11-03 RX ORDER — ONDANSETRON 2 MG/ML
4 INJECTION INTRAMUSCULAR; INTRAVENOUS ONCE AS NEEDED
Status: DISCONTINUED | OUTPATIENT
Start: 2021-11-03 | End: 2021-11-03 | Stop reason: HOSPADM

## 2021-11-03 RX ORDER — KETOROLAC TROMETHAMINE 30 MG/ML
INJECTION, SOLUTION INTRAMUSCULAR; INTRAVENOUS AS NEEDED
Status: DISCONTINUED | OUTPATIENT
Start: 2021-11-03 | End: 2021-11-03 | Stop reason: SURG

## 2021-11-03 RX ORDER — MEPERIDINE HYDROCHLORIDE 25 MG/ML
12.5 INJECTION INTRAMUSCULAR; INTRAVENOUS; SUBCUTANEOUS
Status: DISCONTINUED | OUTPATIENT
Start: 2021-11-03 | End: 2021-11-03 | Stop reason: HOSPADM

## 2021-11-03 RX ORDER — SODIUM CHLORIDE 0.9 % (FLUSH) 0.9 %
10 SYRINGE (ML) INJECTION AS NEEDED
Status: DISCONTINUED | OUTPATIENT
Start: 2021-11-03 | End: 2021-11-03 | Stop reason: HOSPADM

## 2021-11-03 RX ORDER — DEXAMETHASONE SODIUM PHOSPHATE 4 MG/ML
INJECTION, SOLUTION INTRA-ARTICULAR; INTRALESIONAL; INTRAMUSCULAR; INTRAVENOUS; SOFT TISSUE AS NEEDED
Status: DISCONTINUED | OUTPATIENT
Start: 2021-11-03 | End: 2021-11-03 | Stop reason: SURG

## 2021-11-03 RX ORDER — ONDANSETRON 2 MG/ML
INJECTION INTRAMUSCULAR; INTRAVENOUS AS NEEDED
Status: DISCONTINUED | OUTPATIENT
Start: 2021-11-03 | End: 2021-11-03 | Stop reason: SURG

## 2021-11-03 RX ORDER — MIDAZOLAM HYDROCHLORIDE 2 MG/2ML
2 INJECTION, SOLUTION INTRAMUSCULAR; INTRAVENOUS ONCE
Status: COMPLETED | OUTPATIENT
Start: 2021-11-03 | End: 2021-11-03

## 2021-11-03 RX ORDER — SODIUM CHLORIDE, SODIUM LACTATE, POTASSIUM CHLORIDE, CALCIUM CHLORIDE 600; 310; 30; 20 MG/100ML; MG/100ML; MG/100ML; MG/100ML
9 INJECTION, SOLUTION INTRAVENOUS CONTINUOUS PRN
Status: DISCONTINUED | OUTPATIENT
Start: 2021-11-03 | End: 2021-11-03 | Stop reason: HOSPADM

## 2021-11-03 RX ORDER — TRAMADOL HYDROCHLORIDE 50 MG/1
50 TABLET ORAL EVERY 6 HOURS PRN
Qty: 10 TABLET | Refills: 0 | Status: SHIPPED | OUTPATIENT
Start: 2021-11-03 | End: 2022-02-10

## 2021-11-03 RX ORDER — MAGNESIUM HYDROXIDE 1200 MG/15ML
LIQUID ORAL AS NEEDED
Status: DISCONTINUED | OUTPATIENT
Start: 2021-11-03 | End: 2021-11-03 | Stop reason: HOSPADM

## 2021-11-03 RX ORDER — LIDOCAINE HYDROCHLORIDE 20 MG/ML
INJECTION, SOLUTION INFILTRATION; PERINEURAL AS NEEDED
Status: DISCONTINUED | OUTPATIENT
Start: 2021-11-03 | End: 2021-11-03 | Stop reason: SURG

## 2021-11-03 RX ORDER — HYDROCODONE BITARTRATE AND ACETAMINOPHEN 5; 325 MG/1; MG/1
1 TABLET ORAL ONCE AS NEEDED
Status: DISCONTINUED | OUTPATIENT
Start: 2021-11-03 | End: 2021-11-03 | Stop reason: HOSPADM

## 2021-11-03 RX ORDER — SODIUM CHLORIDE 0.9 % (FLUSH) 0.9 %
10 SYRINGE (ML) INJECTION EVERY 12 HOURS SCHEDULED
Status: DISCONTINUED | OUTPATIENT
Start: 2021-11-03 | End: 2021-11-03 | Stop reason: HOSPADM

## 2021-11-03 RX ORDER — SODIUM CHLORIDE, SODIUM LACTATE, POTASSIUM CHLORIDE, CALCIUM CHLORIDE 600; 310; 30; 20 MG/100ML; MG/100ML; MG/100ML; MG/100ML
70 INJECTION, SOLUTION INTRAVENOUS CONTINUOUS
Status: DISCONTINUED | OUTPATIENT
Start: 2021-11-03 | End: 2021-11-03 | Stop reason: HOSPADM

## 2021-11-03 RX ADMIN — LABETALOL 20 MG/4 ML (5 MG/ML) INTRAVENOUS SYRINGE 10 MG: at 11:02

## 2021-11-03 RX ADMIN — HYDROMORPHONE HYDROCHLORIDE 0.5 MG: 1 INJECTION, SOLUTION INTRAMUSCULAR; INTRAVENOUS; SUBCUTANEOUS at 11:35

## 2021-11-03 RX ADMIN — KETOROLAC TROMETHAMINE 30 MG: 30 INJECTION, SOLUTION INTRAMUSCULAR; INTRAVENOUS at 10:45

## 2021-11-03 RX ADMIN — DEXAMETHASONE SODIUM PHOSPHATE 4 MG: 4 INJECTION INTRA-ARTICULAR; INTRALESIONAL; INTRAMUSCULAR; INTRAVENOUS; SOFT TISSUE at 10:45

## 2021-11-03 RX ADMIN — MIDAZOLAM HYDROCHLORIDE 2 MG: 1 INJECTION, SOLUTION INTRAMUSCULAR; INTRAVENOUS at 10:09

## 2021-11-03 RX ADMIN — CEFAZOLIN SODIUM 2 G: 2 INJECTION, SOLUTION INTRAVENOUS at 10:44

## 2021-11-03 RX ADMIN — PROPOFOL 200 MG: 10 INJECTION, EMULSION INTRAVENOUS at 10:39

## 2021-11-03 RX ADMIN — FENTANYL CITRATE 50 MCG: 50 INJECTION, SOLUTION INTRAMUSCULAR; INTRAVENOUS at 10:39

## 2021-11-03 RX ADMIN — ACETAMINOPHEN 1000 MG: 500 TABLET ORAL at 09:53

## 2021-11-03 RX ADMIN — LIDOCAINE HYDROCHLORIDE 100 MG: 20 INJECTION, SOLUTION INFILTRATION; PERINEURAL at 10:39

## 2021-11-03 RX ADMIN — ONDANSETRON 4 MG: 2 INJECTION INTRAMUSCULAR; INTRAVENOUS at 10:45

## 2021-11-03 RX ADMIN — SODIUM CHLORIDE, POTASSIUM CHLORIDE, SODIUM LACTATE AND CALCIUM CHLORIDE 9 ML/HR: 600; 310; 30; 20 INJECTION, SOLUTION INTRAVENOUS at 09:52

## 2021-11-03 RX ADMIN — OXYCODONE HYDROCHLORIDE 5 MG: 5 TABLET ORAL at 11:46

## 2021-11-03 NOTE — ANESTHESIA PREPROCEDURE EVALUATION
Anesthesia Evaluation     Patient summary reviewed and Nursing notes reviewed   no history of anesthetic complications:  NPO Solid Status: > 8 hours  NPO Liquid Status: > 2 hours           Airway   Mallampati: III  TM distance: >3 FB  Neck ROM: full  Large neck circumference  Dental      Pulmonary - normal exam    breath sounds clear to auscultation  (+) sleep apnea,   Cardiovascular - normal exam  Exercise tolerance: good (4-7 METS)    Rhythm: regular    (+) hypertension, hyperlipidemia,       Neuro/Psych- negative ROS  GI/Hepatic/Renal/Endo    (+)  GERD,      Musculoskeletal (-) negative ROS    Abdominal    Substance History - negative use     OB/GYN negative ob/gyn ROS         Other - negative ROS                       Anesthesia Plan    ASA 3     general   (Patient understands anesthesia not responsible for dental damage.)  intravenous induction     Anesthetic plan, all risks, benefits, and alternatives have been provided, discussed and informed consent has been obtained with: patient.    Plan discussed with CRNA.

## 2021-11-03 NOTE — ANESTHESIA POSTPROCEDURE EVALUATION
Patient: Heber Harley    Procedure Summary     Date: 11/03/21 Room / Location: [facility]  /  [facility]    Anesthesia Start: 1036 Anesthesia Stop: 1111    Procedure: HEMORRHOID BANDING (N/A Rectum) Diagnosis:       Grade III hemorrhoids      (Grade III hemorrhoids [K64.2])    Surgeons: Matthew Coronel MD Provider: Crescencio Haskins MD    Anesthesia Type: general ASA Status: 3          Anesthesia Type: general    Vitals  Vitals Value Taken Time   /94 11/03/21 1208   Temp 36.2 °C (97.1 °F) 11/03/21 1111   Pulse 69 11/03/21 1212   Resp 18 11/03/21 1155   SpO2 94 % 11/03/21 1212   Vitals shown include unvalidated device data.        Anesthesia Post Evaluation

## 2021-11-16 ENCOUNTER — OFFICE VISIT (OUTPATIENT)
Dept: SURGERY | Facility: CLINIC | Age: 46
End: 2021-11-16

## 2021-11-16 VITALS — RESPIRATION RATE: 16 BRPM | WEIGHT: 254 LBS | BODY MASS INDEX: 35.56 KG/M2 | HEIGHT: 71 IN

## 2021-11-16 DIAGNOSIS — K64.2 GRADE III HEMORRHOIDS: Primary | ICD-10-CM

## 2021-11-16 PROCEDURE — 99024 POSTOP FOLLOW-UP VISIT: CPT | Performed by: SURGERY

## 2021-11-16 NOTE — PROGRESS NOTES
Chief Complaint  Post-op    Subjective          Heber Harley presents to [facility] GENERAL SURGERY  History of Present Illness    Heber Harley is a 46 y.o. male  who presents today for a postoperative visit.     Patient is here for a follow-up after a 3 quadrant hemorrhoid banding.  He feels much better now he is having no pain with bowel movements.  He is having no bleeding with his bowel movements.    Past History:  Medical History: has a past medical history of Chronic back pain, GERD (gastroesophageal reflux disease), Hyperlipidemia, Hypertension, Hypertriglyceridemia, and Sleep apnea.   Surgical History: has a past surgical history that includes Ankle surgery (Right) and Hemorrhoid surgery (N/A, 11/3/2021).   Family History: family history includes Cancer in his maternal uncle and paternal grandfather; Coronary artery disease in his mother; Diabetes in his maternal uncle; Heart disease in his father; Hypertension in his father and mother.   Social History: reports that he quit smoking about 6 years ago. He quit after 5.00 years of use. He uses smokeless tobacco. He reports current alcohol use of about 12.0 standard drinks of alcohol per week. He reports current drug use. Frequency: 7.00 times per week. Drug: Marijuana.  Allergies: Patient has no known allergies.       Current Outpatient Medications:   •  aspirin 81 MG chewable tablet, Chew 81 mg Daily. NOT INST BY DR. MAN TO HOLD, Disp: , Rfl:   •  Bempedoic Acid-Ezetimibe (Nexlizet) 180-10 MG tablet, Take 1 tablet by mouth Daily. (Patient taking differently: Take 1 tablet by mouth Every Morning.), Disp: 90 tablet, Rfl: 1  •  cephalexin (KEFLEX) 500 MG capsule, Take 1 capsule by mouth 3 (Three) Times a Day., Disp: 21 capsule, Rfl: 0  •  diclofenac (VOLTAREN) 75 MG EC tablet, Take 1 tablet by mouth 2 (Two) Times a Day As Needed. (Patient taking differently: Take 75 mg by mouth 2 (Two) Times a Day As Needed. INST PER ANESTHESIA  PROTOCOL), Disp: 60 tablet, Rfl: 1  •  fenofibrate 160 MG tablet, Take 1 tablet by mouth Daily. (Patient taking differently: Take 160 mg by mouth Every Night.), Disp: 90 tablet, Rfl: 1  •  fenofibrate 160 MG tablet, Take 1 tablet by mouth Daily., Disp: 90 tablet, Rfl: 1  •  HYDROcodone-acetaminophen (NORCO) 5-325 MG per tablet, Take 1 tablet by mouth Every 8 (Eight) Hours As Needed for pain, Disp: 90 tablet, Rfl: 0  •  [START ON 11/21/2021] HYDROcodone-acetaminophen (NORCO) 5-325 MG per tablet, Take 1 tablet 3 times a day by oral route as needed for 30 days., Disp: 90 tablet, Rfl: 0  •  lisinopril (PRINIVIL,ZESTRIL) 10 MG tablet, Take 10 mg by mouth Every Morning., Disp: , Rfl:   •  lisinopril (PRINIVIL,ZESTRIL) 10 MG tablet, Take 1 tablet by mouth Daily., Disp: 90 tablet, Rfl: 1  •  lisinopril (PRINIVIL,ZESTRIL) 10 MG tablet, Take 1 tablet by mouth Daily., Disp: 90 tablet, Rfl: 1  •  nitroglycerin (NITROSTAT) 0.4 MG SL tablet, Place 0.4 mg under the tongue Every 5 (Five) Minutes As Needed for Chest Pain. Take no more than 3 doses in 15 minutes., Disp: , Rfl:   •  Omega-3 Fatty Acids (FISH OIL) 1000 MG capsule capsule, Take 2,000 mg by mouth 2 (Two) Times a Day With Meals. INST PER ANESTHESIA PROTOCOL, Disp: , Rfl:   •  omeprazole (priLOSEC) 20 MG capsule, Take 1 capsule by mouth 2 (Two) Times a Day., Disp: 180 capsule, Rfl: 1  •  omeprazole (priLOSEC) 20 MG capsule, Take 1 capsule by mouth 2 (Two) Times a Day., Disp: 180 capsule, Rfl: 1  •  omeprazole (priLOSEC) 20 MG capsule, Take 1 capsule by mouth 2 (Two) Times a Day., Disp: 180 capsule, Rfl: 1  •  sildenafil (REVATIO) 20 MG tablet, Take 1-2 tablets by mouth As Needed 1 hour prior to intercourse, Disp: 30 tablet, Rfl: 2  •  sildenafil (REVATIO) 20 MG tablet, Take 1-2 tablets by mouth 1 hour prior to intercourse, Disp: 30 tablet, Rfl: 2  •  terbinafine (lamiSIL) 250 MG tablet, TAKE ONE TABLET BY MOUTH DAILY (Patient taking differently: Take 250 mg by mouth Every  "Morning.), Disp: 90 tablet, Rfl: 0  •  Testosterone (ANDROGEL PUMP) 20.25 MG/ACT (1.62%) gel, Place  on the skin 2 (Two) Times a Day., Disp: , Rfl:   •  Testosterone 30 MG/ACT solution, 1 PUMP ON EACH AXILLA DAILY, Disp: 90 mL, Rfl: 5  •  Testosterone Cypionate (DEPOTESTOTERONE CYPIONATE) 200 MG/ML injection, Inject 1 mL into the appropriate muscle as directed by prescriber Every 14 (Fourteen) Days., Disp: 2 mL, Rfl: 0  •  Testosterone Cypionate (DEPOTESTOTERONE CYPIONATE) 200 MG/ML injection, Inject 1 mL into the appropriate muscle as directed by prescriber Every 14 (Fourteen) Days., Disp: 2 mL, Rfl: 5  •  traMADol (ULTRAM) 50 MG tablet, Take 1 tablet by mouth Every 6 (Six) Hours As Needed for Moderate Pain ., Disp: 10 tablet, Rfl: 0  •  vitamin D (ERGOCALCIFEROL) 1.25 MG (54065 UT) capsule capsule, Take 1 capsule by mouth 1 (One) Time Per Week., Disp: 12 capsule, Rfl: 3  •  vitamin D (ERGOCALCIFEROL) 1.25 MG (99417 UT) capsule capsule, Take 1 capsule by mouth 1 (One) Time Per Week., Disp: 12 capsule, Rfl: 3  •  Vortioxetine HBr (Trintellix) 10 MG tablet, Take 10 mg by mouth Daily., Disp: 90 tablet, Rfl: 1  •  Vortioxetine HBr (Trintellix) 10 MG tablet, Take 10 mg by mouth Daily., Disp: 90 tablet, Rfl: 1  •  Vortioxetine HBr (Trintellix) 10 MG tablet, Take 1 tablet by mouth Daily., Disp: 90 tablet, Rfl: 1       Physical Exam  He appears well today his abdomen is soft  Objective     Vital Signs:   Resp 16   Ht 180.3 cm (71\")   Wt 115 kg (254 lb)   BMI 35.43 kg/m²              Assessment and Plan    Diagnoses and all orders for this visit:    1. Grade III hemorrhoids (Primary)    I will see him back on an as-needed basis.  Of asked him to call me should he have any further problems.      "
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
## 2022-02-10 ENCOUNTER — OFFICE VISIT (OUTPATIENT)
Dept: NEUROSURGERY | Facility: CLINIC | Age: 47
End: 2022-02-10

## 2022-02-10 VITALS
DIASTOLIC BLOOD PRESSURE: 20 MMHG | SYSTOLIC BLOOD PRESSURE: 118 MMHG | HEART RATE: 72 BPM | HEIGHT: 70 IN | WEIGHT: 254 LBS | BODY MASS INDEX: 36.36 KG/M2 | TEMPERATURE: 98.2 F | OXYGEN SATURATION: 98 %

## 2022-02-10 DIAGNOSIS — M53.3 CHRONIC RIGHT SI JOINT PAIN: Primary | ICD-10-CM

## 2022-02-10 DIAGNOSIS — G89.29 CHRONIC RIGHT-SIDED LOW BACK PAIN WITH RIGHT-SIDED SCIATICA: ICD-10-CM

## 2022-02-10 DIAGNOSIS — G89.29 CHRONIC RIGHT SI JOINT PAIN: Primary | ICD-10-CM

## 2022-02-10 DIAGNOSIS — M54.41 CHRONIC RIGHT-SIDED LOW BACK PAIN WITH RIGHT-SIDED SCIATICA: ICD-10-CM

## 2022-02-10 PROCEDURE — 99204 OFFICE O/P NEW MOD 45 MIN: CPT | Performed by: NEUROLOGICAL SURGERY

## 2022-02-10 RX ORDER — LIDOCAINE 50 MG/G
OINTMENT TOPICAL
Status: ON HOLD | COMMUNITY
End: 2022-07-20

## 2022-06-29 ENCOUNTER — OFFICE VISIT (OUTPATIENT)
Dept: CARDIOLOGY | Facility: CLINIC | Age: 47
End: 2022-06-29

## 2022-06-29 VITALS
DIASTOLIC BLOOD PRESSURE: 96 MMHG | SYSTOLIC BLOOD PRESSURE: 138 MMHG | WEIGHT: 248 LBS | HEART RATE: 79 BPM | BODY MASS INDEX: 35.5 KG/M2 | HEIGHT: 70 IN

## 2022-06-29 DIAGNOSIS — R07.2 PRECORDIAL PAIN: Primary | ICD-10-CM

## 2022-06-29 DIAGNOSIS — I10 ESSENTIAL HYPERTENSION: ICD-10-CM

## 2022-06-29 PROCEDURE — 93000 ELECTROCARDIOGRAM COMPLETE: CPT | Performed by: INTERNAL MEDICINE

## 2022-06-29 PROCEDURE — 99204 OFFICE O/P NEW MOD 45 MIN: CPT | Performed by: INTERNAL MEDICINE

## 2022-06-29 RX ORDER — ASPIRIN 325 MG
325 TABLET ORAL DAILY
COMMUNITY
End: 2022-07-22 | Stop reason: HOSPADM

## 2022-06-29 NOTE — PROGRESS NOTES
Heber Batistaris  1975  Date of Office Visit: 06/29/22  Encounter Provider: Kevin Velasquez MD  Place of Service: [facility] CARDIOLOGY      CHIEF COMPLAINT:  Chest pain  Essential hypertension: Not at goal      HISTORY OF PRESENT ILLNESS:  47-year-old male with a medical history of essential hypertension, palpitations, and occasional chest pain who presents to me for evaluation..  He states that over the past month or 2 that he has noticed palpitations that come on once a week to twice a week.  They are short in duration and last less than 2 to 3 minutes typically.  He states it feels like his heart is flip flopping like a fish on a boat.  He has also noticed that his blood pressure has been elevated over the past few weeks and with the elevated blood pressure he is occasionally had chest pain.  He states that the chest pain is short in duration and goes away on its own.  It does not seem to come on with activity.    Review of Systems   Constitutional: Negative for fever and malaise/fatigue.   HENT: Negative for nosebleeds and sore throat.    Eyes: Negative for blurred vision and double vision.   Cardiovascular: Negative for chest pain, claudication, palpitations and syncope.   Respiratory: Negative for cough, shortness of breath and snoring.    Endocrine: Negative for cold intolerance, heat intolerance and polydipsia.   Skin: Negative for itching, poor wound healing and rash.   Musculoskeletal: Negative for joint pain, joint swelling, muscle weakness and myalgias.   Gastrointestinal: Negative for abdominal pain, melena, nausea and vomiting.   Neurological: Negative for light-headedness, loss of balance, seizures, vertigo and weakness.   Psychiatric/Behavioral: Negative for altered mental status and depression.          Past Medical History:   Diagnosis Date   • Chronic back pain     [facility] PN MGMT [place], [facility]   • GERD (gastroesophageal reflux disease)    •  Hyperlipidemia    • Hypertension    • Hypertriglyceridemia    • Sleep apnea     On CPAP       The following portions of the patient's history were reviewed and updated as appropriate: Social history , Family history and Surgical history     Current Outpatient Medications on File Prior to Visit   Medication Sig Dispense Refill   • aspirin 81 MG chewable tablet Chew 81 mg Daily. NOT INST BY DR. MAN TO HOLD     • Bempedoic Acid-Ezetimibe (Nexlizet) 180-10 MG tablet Take 1 tablet by mouth Daily. (Patient taking differently: Take 1 tablet by mouth Every Morning.) 90 tablet 1   • Bempedoic Acid-Ezetimibe (Nexlizet) 180-10 MG tablet Take 1 tablet by mouth Daily. 90 tablet 1   • Calcium Carb-Cholecalciferol (Calcium + D3) 600-800 MG-UNIT tablet take 1 tablet by mouth twice daily 180 tablet 3   • diclofenac (VOLTAREN) 75 MG EC tablet Take 1 tablet by mouth 2 (Two) Times a Day As Needed. (Patient taking differently: Take 75 mg by mouth 2 (Two) Times a Day As Needed. INST PER ANESTHESIA PROTOCOL) 60 tablet 1   • diclofenac (VOLTAREN) 75 MG EC tablet May take 1 tablet by mouth twice daily as needed for pain 60 tablet 2   • doxycycline (VIBRAMYCIN) 100 MG capsule Take 1 capsule by mouth Every 12 (Twelve) Hours. 28 capsule 0   • HYDROcodone-acetaminophen (NORCO) 7.5-325 MG per tablet Take 1 tablet by mouth 3 (Three) Times a Day As Needed for pain. *May take 1 extra tablet per day if needed* 100 tablet 0   • HYDROcodone-acetaminophen (NORCO) 7.5-325 MG per tablet Take 1 tablet by mouth 3 (Three) Times a Day As Needed for pain. *MAY TAKE EXTRA IF NEEDED* 100 tablet 0   • HYDROcodone-acetaminophen (NORCO) 7.5-325 MG per tablet TAKE 1 TABLET 3 TIMES DAILY AS NEEDED FOR PAIN.*MAY TAKE EXTRA IF NEEDED* 100 tablet 0   • [START ON 7/1/2022] HYDROcodone-acetaminophen (NORCO) 7.5-325 MG per tablet TAKE 1 TABLET 3 TIMES DAILY AS NEEDED FOR PAIN.*MAY TAKE EXTRA IF NEEDED* 100 tablet 0   • lidocaine (XYLOCAINE) 5 % ointment lidocaine 5 %  "topical ointment     • lisinopril (PRINIVIL,ZESTRIL) 10 MG tablet Take 10 mg by mouth Every Morning.     • lisinopril (PRINIVIL,ZESTRIL) 20 MG tablet TAKE 1 TABLET DAILY AS DIRECTED. 90 tablet 1   • lisinopril (PRINIVIL,ZESTRIL) 40 MG tablet Take 1 tablet by mouth Daily. 90 tablet 0   • nitroglycerin (NITROSTAT) 0.4 MG SL tablet Place 0.4 mg under the tongue Every 5 (Five) Minutes As Needed for Chest Pain. Take no more than 3 doses in 15 minutes.     • Omega-3 Fatty Acids (FISH OIL) 1000 MG capsule capsule Take 2,000 mg by mouth 2 (Two) Times a Day With Meals. INST PER ANESTHESIA PROTOCOL     • omeprazole (priLOSEC) 20 MG capsule Take 1 capsule by mouth 2 (Two) Times a Day. 180 capsule 1   • omeprazole (priLOSEC) 20 MG capsule TAKE ONE CAPSULE BY MOUTH TWICE A  capsule 1   • sildenafil (REVATIO) 20 MG tablet Take 1-2 tablets by mouth 1 hour prior to intercourse 30 tablet 2   • terbinafine (lamiSIL) 250 MG tablet TAKE ONE TABLET BY MOUTH DAILY (Patient taking differently: Take 250 mg by mouth Every Morning.) 90 tablet 0   • terbinafine (lamiSIL) 250 MG tablet Take 1 tablet by mouth Daily. 90 tablet 0   • Testosterone (ANDROGEL PUMP) 20.25 MG/ACT (1.62%) gel Place  on the skin 2 (Two) Times a Day.     • Testosterone 30 MG/ACT solution 1 PUMP ON EACH AXILLA DAILY 90 mL 5   • Testosterone Cypionate (DEPOTESTOTERONE CYPIONATE) 200 MG/ML injection Inject 1 mL into the appropriate muscle as directed by prescriber Every 14 (Fourteen) Days. 2 mL 5   • vitamin D (ERGOCALCIFEROL) 1.25 MG (76243 UT) capsule capsule Take 1 capsule by mouth 1 (One) Time Per Week. 12 capsule 3   • Vortioxetine HBr (Trintellix) 10 MG tablet Take 1 tablet by mouth Daily. 90 tablet 1   • Vortioxetine HBr (Trintellix) 10 MG tablet TAKE 1 TABLET BY MOUTH EVERY DAY 90 tablet 1     No current facility-administered medications on file prior to visit.       No Known Allergies    Vitals:    06/29/22 1528   Height: 177.8 cm (70\")     Body mass index " is 36.45 kg/m².   Constitutional:       Appearance: Well-developed.   Eyes:      General: No scleral icterus.     Conjunctiva/sclera: Conjunctivae normal.   HENT:      Head: Normocephalic and atraumatic.   Neck:      Thyroid: No thyromegaly.      Vascular: Normal carotid pulses. No carotid bruit, hepatojugular reflux or JVD.      Trachea: No tracheal deviation.   Pulmonary:      Effort: No respiratory distress.      Breath sounds: Normal breath sounds. No decreased breath sounds. No wheezing. No rhonchi. No rales.   Chest:      Chest wall: Not tender to palpatation.   Cardiovascular:      Normal rate. Regular rhythm.      No gallop.   Pulses:     Carotid: 2+ bilaterally.     Radial: 2+ bilaterally.     Femoral: 2+ bilaterally.     Dorsalis pedis: 2+ bilaterally.     Posterior tibial: 2+ bilaterally.  Edema:     Peripheral edema absent.   Abdominal:      General: Bowel sounds are normal. There is no distension.      Palpations: Abdomen is soft.      Tenderness: There is no abdominal tenderness.   Musculoskeletal:         General: No deformity.      Cervical back: Normal range of motion and neck supple. Skin:     Findings: No erythema or rash.   Neurological:      Mental Status: Alert and oriented to person, place, and time.      Sensory: No sensory deficit.   Psychiatric:         Behavior: Behavior normal.           Lab Results   Component Value Date    WBC 9.87 08/05/2018    HGB 15.7 08/05/2018    HCT 46.5 08/05/2018    MCV 91.2 08/05/2018     08/05/2018       Lab Results   Component Value Date    GLUCOSE 107 (H) 08/05/2018    BUN 8 08/05/2018    CREATININE 1.16 08/05/2018    EGFRIFNONA 69 08/05/2018    EGFRIFAFRI >60 11/13/2015    BCR 6.9 (L) 08/05/2018    K 4.4 08/05/2018    CO2 25.9 08/05/2018    CALCIUM 9.8 08/05/2018    PROTENTOTREF 7.5 11/13/2015    ALBUMIN 4.90 08/05/2018    LABIL2 2.0 11/13/2015    AST 59 (H) 08/05/2018    ALT 98 (H) 08/05/2018       Lab Results   Component Value Date    GLUCOSE 107  (H) 08/05/2018    CALCIUM 9.8 08/05/2018     08/05/2018    K 4.4 08/05/2018    CO2 25.9 08/05/2018     08/05/2018    BUN 8 08/05/2018    CREATININE 1.16 08/05/2018    EGFRIFAFRI >60 11/13/2015    EGFRIFNONA 69 08/05/2018    BCR 6.9 (L) 08/05/2018    ANIONGAP 14.1 08/05/2018       Lab Results   Component Value Date    CHLPL 159 09/11/2015    TRIG 297 (H) 09/11/2015    HDL 20 (L) 09/11/2015    LDL 79 09/11/2015         ECG 12 Lead    Date/Time: 6/29/2022 4:05 PM  Performed by: Kevin Velasquez MD  Authorized by: Kevin Velasquez MD   Comparison: compared with previous ECG from 5/23/2017  Similar to previous ECG  Rhythm: sinus rhythm  Rate: normal  QRS axis: normal    Clinical impression: normal ECG           5/30/17 Stress  · Diaphragmatic attenuation and GI artifacts are present.  · Myocardial perfusion imaging indicates a normal myocardial perfusion study with no evidence of ischemia.  · Left ventricular ejection fraction is normal (Calculated EF = 67%).  · Impressions are consistent with a low risk study.          DISCUSSION/SUMMARY  Very pleasant 47-year-old with a medical history of essential hypertension, not quite at goal, palpitations, and chest pain who presents back to me for evaluation.  His chest pain seems to be atypical and short in duration.  His EKG is unchanged from prior.  His blood pressure is not quite at goal and his primary care has recommended increasing lisinopril therapy which she has not done yet.  I think that our initial approach should be to control his blood pressure and get an echo and if he continues to have symptoms of discomfort even in the setting of normal blood pressure we can get a stress test at that time.    1.  Palpitations: Infrequent.  I would not recommend monitoring.  Hold off on beta-blockade at this point in time.  The palpitations increase in frequency or intensity we could consider adding low-dose Toprol at that time.    2.  Essential  hypertension: Not quite at goal.  Agree with increase lisinopril to 40 mg p.o. daily.  We will get that filled today.  - Transthoracic echocardiogram ordered.  We will touch base with him after the echo and reassess symptoms in a couple of weeks.   6

## 2022-07-01 ENCOUNTER — HOSPITAL ENCOUNTER (OUTPATIENT)
Dept: CARDIOLOGY | Facility: HOSPITAL | Age: 47
Discharge: HOME | End: 2022-07-01
Admitting: INTERNAL MEDICINE

## 2022-07-01 VITALS
HEIGHT: 70 IN | DIASTOLIC BLOOD PRESSURE: 90 MMHG | SYSTOLIC BLOOD PRESSURE: 130 MMHG | HEART RATE: 65 BPM | BODY MASS INDEX: 35.5 KG/M2 | WEIGHT: 248 LBS

## 2022-07-01 DIAGNOSIS — R07.2 PRECORDIAL PAIN: ICD-10-CM

## 2022-07-01 LAB
AORTIC ARCH: 3 CM
AORTIC DIMENSIONLESS INDEX: 0.7 (DI)
ASCENDING AORTA: 3.2 CM
BH CV ECHO LEFT VENTRICLE GLOBAL LONGITUDINAL STRAIN: -22.7 %
BH CV ECHO MEAS - ACS: 2.3 CM
BH CV ECHO MEAS - AO MAX PG: 13.8 MMHG
BH CV ECHO MEAS - AO MEAN PG: 6.9 MMHG
BH CV ECHO MEAS - AO ROOT DIAM: 3.8 CM
BH CV ECHO MEAS - AO V2 MAX: 186 CM/SEC
BH CV ECHO MEAS - AO V2 VTI: 32.9 CM
BH CV ECHO MEAS - AVA(I,D): 2.9 CM2
BH CV ECHO MEAS - EDV(CUBED): 125.8 ML
BH CV ECHO MEAS - EDV(MOD-SP2): 99 ML
BH CV ECHO MEAS - EDV(MOD-SP4): 112 ML
BH CV ECHO MEAS - EF(MOD-BP): 63 %
BH CV ECHO MEAS - EF(MOD-SP2): 62.6 %
BH CV ECHO MEAS - EF(MOD-SP4): 63.4 %
BH CV ECHO MEAS - EF_3D-VOL: 56 %
BH CV ECHO MEAS - ESV(CUBED): 35.4 ML
BH CV ECHO MEAS - ESV(MOD-SP2): 37 ML
BH CV ECHO MEAS - ESV(MOD-SP4): 41 ML
BH CV ECHO MEAS - FS: 34.5 %
BH CV ECHO MEAS - IVS/LVPW: 1.29 CM
BH CV ECHO MEAS - IVSD: 1.23 CM
BH CV ECHO MEAS - LA 3D VOL INDEX: 48
BH CV ECHO MEAS - LAT PEAK E' VEL: 18.4 CM/SEC
BH CV ECHO MEAS - LV DIASTOLIC VOL/BSA (35-75): 49 CM2
BH CV ECHO MEAS - LV MASS(C)D: 206 GRAMS
BH CV ECHO MEAS - LV MAX PG: 6.3 MMHG
BH CV ECHO MEAS - LV MEAN PG: 3.6 MMHG
BH CV ECHO MEAS - LV SYSTOLIC VOL/BSA (12-30): 17.9 CM2
BH CV ECHO MEAS - LV V1 MAX: 125.3 CM/SEC
BH CV ECHO MEAS - LV V1 VTI: 24.2 CM
BH CV ECHO MEAS - LVIDD: 5 CM
BH CV ECHO MEAS - LVIDS: 3.3 CM
BH CV ECHO MEAS - LVOT AREA: 4 CM2
BH CV ECHO MEAS - LVOT DIAM: 2.26 CM
BH CV ECHO MEAS - LVPWD: 0.96 CM
BH CV ECHO MEAS - MED PEAK E' VEL: 9.4 CM/SEC
BH CV ECHO MEAS - MV A DUR: 0.14 SEC
BH CV ECHO MEAS - MV A MAX VEL: 81.8 CM/SEC
BH CV ECHO MEAS - MV DEC SLOPE: 342.7 CM/SEC2
BH CV ECHO MEAS - MV DEC TIME: 0.2 MSEC
BH CV ECHO MEAS - MV E MAX VEL: 89.1 CM/SEC
BH CV ECHO MEAS - MV E/A: 1.09
BH CV ECHO MEAS - MV MAX PG: 4.1 MMHG
BH CV ECHO MEAS - MV MEAN PG: 1.58 MMHG
BH CV ECHO MEAS - MV P1/2T: 90.5 MSEC
BH CV ECHO MEAS - MV V2 VTI: 25.9 CM
BH CV ECHO MEAS - MVA(P1/2T): 2.43 CM2
BH CV ECHO MEAS - MVA(VTI): 3.7 CM2
BH CV ECHO MEAS - PA ACC TIME: 0.11 SEC
BH CV ECHO MEAS - PA PR(ACCEL): 31.5 MMHG
BH CV ECHO MEAS - PA V2 MAX: 118.1 CM/SEC
BH CV ECHO MEAS - PULM A REVS DUR: 0.15 SEC
BH CV ECHO MEAS - PULM A REVS VEL: 29.4 CM/SEC
BH CV ECHO MEAS - PULM DIAS VEL: 72.9 CM/SEC
BH CV ECHO MEAS - PULM S/D: 0.84
BH CV ECHO MEAS - PULM SYS VEL: 61.2 CM/SEC
BH CV ECHO MEAS - QP/QS: 1.19
BH CV ECHO MEAS - RV MAX PG: 2.7 MMHG
BH CV ECHO MEAS - RV V1 MAX: 82.3 CM/SEC
BH CV ECHO MEAS - RV V1 VTI: 19.2 CM
BH CV ECHO MEAS - RVOT DIAM: 2.8 CM
BH CV ECHO MEAS - SI(MOD-SP2): 27.1 ML/M2
BH CV ECHO MEAS - SI(MOD-SP4): 31 ML/M2
BH CV ECHO MEAS - SV(LVOT): 96.9 ML
BH CV ECHO MEAS - SV(MOD-SP2): 62 ML
BH CV ECHO MEAS - SV(MOD-SP4): 71 ML
BH CV ECHO MEAS - SV(RVOT): 115 ML
BH CV ECHO MEAS - TAPSE (>1.6): 2.39 CM
BH CV ECHO MEASUREMENTS AVERAGE E/E' RATIO: 6.41
BH CV XLRA - RV BASE: 3.4 CM
BH CV XLRA - RV LENGTH: 8.1 CM
BH CV XLRA - RV MID: 3.2 CM
BH CV XLRA - TDI S': 14 CM/SEC
LEFT ATRIUM VOLUME INDEX: 20 ML/M2
MAXIMAL PREDICTED HEART RATE: 173 BPM
SINUS: 3.4 CM
STJ: 3 CM
STRESS TARGET HR: 147 BPM

## 2022-07-01 PROCEDURE — 93306 TTE W/DOPPLER COMPLETE: CPT

## 2022-07-01 PROCEDURE — 93356 MYOCRD STRAIN IMG SPCKL TRCK: CPT | Performed by: INTERNAL MEDICINE

## 2022-07-01 PROCEDURE — 93356 MYOCRD STRAIN IMG SPCKL TRCK: CPT

## 2022-07-01 PROCEDURE — 93306 TTE W/DOPPLER COMPLETE: CPT | Performed by: INTERNAL MEDICINE

## 2022-07-20 ENCOUNTER — HOSPITAL ENCOUNTER (INPATIENT)
Facility: HOSPITAL | Age: 47
LOS: 1 days | Discharge: HOME | End: 2022-07-22
Attending: INTERNAL MEDICINE | Admitting: INTERNAL MEDICINE

## 2022-07-20 ENCOUNTER — HOSPITAL ENCOUNTER (EMERGENCY)
Dept: HOSPITAL 49 - FER | Age: 47
Discharge: OTHER INSTITUTION | End: 2022-07-20
Payer: COMMERCIAL

## 2022-07-20 DIAGNOSIS — I21.4 NSTEMI (NON-ST ELEVATED MYOCARDIAL INFARCTION): Primary | ICD-10-CM

## 2022-07-20 DIAGNOSIS — Z79.899: ICD-10-CM

## 2022-07-20 DIAGNOSIS — J98.01: ICD-10-CM

## 2022-07-20 DIAGNOSIS — I10: ICD-10-CM

## 2022-07-20 DIAGNOSIS — Z79.82: ICD-10-CM

## 2022-07-20 DIAGNOSIS — Z20.822: ICD-10-CM

## 2022-07-20 DIAGNOSIS — Z91.038: ICD-10-CM

## 2022-07-20 DIAGNOSIS — I21.4: ICD-10-CM

## 2022-07-20 DIAGNOSIS — R07.9: ICD-10-CM

## 2022-07-20 DIAGNOSIS — F17.220: ICD-10-CM

## 2022-07-20 DIAGNOSIS — T63.461A: Primary | ICD-10-CM

## 2022-07-20 PROBLEM — E78.5 HLD (HYPERLIPIDEMIA): Status: ACTIVE | Noted: 2022-07-20

## 2022-07-20 PROBLEM — R77.8 ELEVATED TROPONIN: Status: ACTIVE | Noted: 2022-07-20

## 2022-07-20 PROBLEM — E66.9 OBESITY (BMI 30-39.9): Status: ACTIVE | Noted: 2022-07-20

## 2022-07-20 PROBLEM — R79.89 ELEVATED TROPONIN: Status: ACTIVE | Noted: 2022-07-20

## 2022-07-20 PROBLEM — G47.33 OSA (OBSTRUCTIVE SLEEP APNEA): Status: ACTIVE | Noted: 2022-07-20

## 2022-07-20 LAB
ALBUMIN SERPL-MCNC: 4.7 G/DL (ref 3.5–5.2)
ALBUMIN/GLOB SERPL: 2 G/DL
ALP SERPL-CCNC: 61 U/L (ref 39–117)
ALT SERPL W P-5'-P-CCNC: 51 U/L (ref 1–41)
ANION GAP SERPL CALCULATED.3IONS-SCNC: 16.5 MMOL/L (ref 5–15)
APTT PPP: 25.2 SECONDS (ref 22.7–35.4)
AST SERPL-CCNC: 69 U/L (ref 1–40)
BASOPHIL: 1.2 % (ref 0–2)
BASOPHILS # BLD AUTO: 0.03 10*3/MM3 (ref 0–0.2)
BASOPHILS NFR BLD AUTO: 0.2 % (ref 0–1.5)
BILIRUB SERPL-MCNC: 0.7 MG/DL (ref 0–1.2)
BUN SERPL-MCNC: 15 MG/DL (ref 6–20)
BUN SERPL-MCNC: 29 MG/DL (ref 7–18)
BUN/CREAT RATIO (CALC): 25.9 RATIO
BUN/CREAT SERPL: 20.5 (ref 7–25)
CALCIUM SPEC-SCNC: 9.3 MG/DL (ref 8.6–10.5)
CHLORIDE SERPL-SCNC: 103 MMOL/L (ref 98–107)
CHLORIDE: 99 MMOL/L (ref 98–107)
CO2 (BICARBONATE): 27 MMOL/L (ref 21–32)
CO2 SERPL-SCNC: 20.5 MMOL/L (ref 22–29)
CREAT SERPL-MCNC: 0.73 MG/DL (ref 0.76–1.27)
CREATININE: 1.12 MG/DL (ref 0.67–1.17)
DEPRECATED RDW RBC AUTO: 39.3 FL (ref 37–54)
EGFRCR SERPLBLD CKD-EPI 2021: 112.9 ML/MIN/1.73
EOSINOPHIL # BLD AUTO: 0 10*3/MM3 (ref 0–0.4)
EOSINOPHIL NFR BLD AUTO: 0 % (ref 0.3–6.2)
EOSINOPHIL: 3 % (ref 0–5)
ERYTHROCYTE [DISTWIDTH] IN BLOOD BY AUTOMATED COUNT: 12.5 % (ref 12.3–15.4)
GLOBULIN UR ELPH-MCNC: 2.3 GM/DL
GLUCOSE SERPL-MCNC: 112 MG/DL (ref 65–99)
GLUCOSE SERPL-MCNC: 120 MG/DL (ref 74–106)
HCT VFR BLD AUTO: 46.5 % (ref 37.5–51)
HCT: 49.6 % (ref 42–52)
HGB BLD-MCNC: 16.2 G/DL (ref 13–17.7)
HGB BLD-MCNC: 17 G/DL (ref 13.2–18)
IMM GRANULOCYTES # BLD AUTO: 0.11 10*3/MM3 (ref 0–0.05)
IMM GRANULOCYTES NFR BLD AUTO: 0.8 % (ref 0–0.5)
INR PPP: 1.01 (ref 0.9–1.2)
INR PPP: 1.17 (ref 0.9–1.1)
LYMPHOCYTE: 43.3 % (ref 15–48)
LYMPHOCYTES # BLD AUTO: 1.31 10*3/MM3 (ref 0.7–3.1)
LYMPHOCYTES NFR BLD AUTO: 9.3 % (ref 19.6–45.3)
MCH RBC QN AUTO: 30.7 PG (ref 26.6–33)
MCH RBC QN AUTO: 30.9 PG (ref 25–31)
MCHC RBC AUTO-ENTMCNC: 34.3 G/DL (ref 32–36)
MCHC RBC AUTO-ENTMCNC: 34.8 G/DL (ref 31.5–35.7)
MCV RBC AUTO: 88.1 FL (ref 79–97)
MCV: 90.2 FL (ref 78–100)
MONOCYTE: 11.7 % (ref 0–12)
MONOCYTES # BLD AUTO: 0.48 10*3/MM3 (ref 0.1–0.9)
MONOCYTES NFR BLD AUTO: 3.4 % (ref 5–12)
MPV: 11.1 FL (ref 6–9.5)
NEUTROPHIL: 40.1 % (ref 41–80)
NEUTROPHILS NFR BLD AUTO: 12.09 10*3/MM3 (ref 1.7–7)
NEUTROPHILS NFR BLD AUTO: 86.3 % (ref 42.7–76)
NRBC BLD AUTO-RTO: 0 /100 WBC (ref 0–0.2)
NRBC: 0
PLATELET # BLD AUTO: 282 10*3/MM3 (ref 140–450)
PLT: 270 K/UL (ref 150–400)
PMV BLD AUTO: 11.4 FL (ref 6–12)
POTASSIUM SERPL-SCNC: 4.7 MMOL/L (ref 3.5–5.2)
POTASSIUM: 3.5 MMOL/L (ref 3.5–5.1)
PROT SERPL-MCNC: 7 G/DL (ref 6–8.5)
PROTHROMBIN TIME: 13 SECONDS (ref 11.9–13.9)
PROTHROMBIN TIME: 14.8 SECONDS (ref 11.7–14.2)
PTT: 22.4 SECONDS (ref 24.9–34.6)
RBC # BLD AUTO: 5.28 10*6/MM3 (ref 4.14–5.8)
RBC MORPHOLOGY: NORMAL
RBC: 5.5 M/UL (ref 4.7–6)
RDW: 12.9 % (ref 11.5–14)
SODIUM SERPL-SCNC: 140 MMOL/L (ref 136–145)
TROPONIN T SERPL-MCNC: 0.15 NG/ML (ref 0–0.03)
WBC NRBC COR # BLD: 14.02 10*3/MM3 (ref 3.4–10.8)
WBC: 10.5 K/UL (ref 4–10.5)

## 2022-07-20 PROCEDURE — G0378 HOSPITAL OBSERVATION PER HR: HCPCS

## 2022-07-20 PROCEDURE — 85610 PROTHROMBIN TIME: CPT | Performed by: NURSE PRACTITIONER

## 2022-07-20 PROCEDURE — 84484 ASSAY OF TROPONIN QUANT: CPT | Performed by: NURSE PRACTITIONER

## 2022-07-20 PROCEDURE — 25010000002 HEPARIN (PORCINE) 25000-0.45 UT/250ML-% SOLUTION: Performed by: NURSE PRACTITIONER

## 2022-07-20 PROCEDURE — 83036 HEMOGLOBIN GLYCOSYLATED A1C: CPT | Performed by: INTERNAL MEDICINE

## 2022-07-20 PROCEDURE — 93010 ELECTROCARDIOGRAM REPORT: CPT | Performed by: INTERNAL MEDICINE

## 2022-07-20 PROCEDURE — 85730 THROMBOPLASTIN TIME PARTIAL: CPT | Performed by: NURSE PRACTITIONER

## 2022-07-20 PROCEDURE — U0002 COVID-19 LAB TEST NON-CDC: HCPCS

## 2022-07-20 PROCEDURE — U0004 COV-19 TEST NON-CDC HGH THRU: HCPCS | Performed by: INTERNAL MEDICINE

## 2022-07-20 PROCEDURE — 80053 COMPREHEN METABOLIC PANEL: CPT | Performed by: NURSE PRACTITIONER

## 2022-07-20 PROCEDURE — 93005 ELECTROCARDIOGRAM TRACING: CPT | Performed by: INTERNAL MEDICINE

## 2022-07-20 PROCEDURE — 85025 COMPLETE CBC W/AUTO DIFF WBC: CPT | Performed by: NURSE PRACTITIONER

## 2022-07-20 RX ORDER — ASPIRIN 325 MG
325 TABLET ORAL DAILY
Status: DISCONTINUED | OUTPATIENT
Start: 2022-07-21 | End: 2022-07-21 | Stop reason: SDUPTHER

## 2022-07-20 RX ORDER — ACETAMINOPHEN 160 MG/5ML
650 SOLUTION ORAL EVERY 4 HOURS PRN
Status: DISCONTINUED | OUTPATIENT
Start: 2022-07-20 | End: 2022-07-22 | Stop reason: HOSPADM

## 2022-07-20 RX ORDER — SODIUM CHLORIDE 0.9 % (FLUSH) 0.9 %
10 SYRINGE (ML) INJECTION AS NEEDED
Status: DISCONTINUED | OUTPATIENT
Start: 2022-07-20 | End: 2022-07-22 | Stop reason: HOSPADM

## 2022-07-20 RX ORDER — ACETAMINOPHEN 325 MG/1
650 TABLET ORAL EVERY 4 HOURS PRN
Status: DISCONTINUED | OUTPATIENT
Start: 2022-07-20 | End: 2022-07-22 | Stop reason: HOSPADM

## 2022-07-20 RX ORDER — NITROGLYCERIN 0.4 MG/1
0.4 TABLET SUBLINGUAL
Status: DISCONTINUED | OUTPATIENT
Start: 2022-07-20 | End: 2022-07-22 | Stop reason: HOSPADM

## 2022-07-20 RX ORDER — PANTOPRAZOLE SODIUM 40 MG/1
40 TABLET, DELAYED RELEASE ORAL EVERY MORNING
Refills: 1 | Status: DISCONTINUED | OUTPATIENT
Start: 2022-07-21 | End: 2022-07-20

## 2022-07-20 RX ORDER — HEPARIN SODIUM 10000 [USP'U]/100ML
8.93 INJECTION, SOLUTION INTRAVENOUS
Status: DISCONTINUED | OUTPATIENT
Start: 2022-07-20 | End: 2022-07-21

## 2022-07-20 RX ORDER — CLOPIDOGREL BISULFATE 75 MG/1
300 TABLET ORAL ONCE
Status: COMPLETED | OUTPATIENT
Start: 2022-07-21 | End: 2022-07-21

## 2022-07-20 RX ORDER — ACETAMINOPHEN 650 MG/1
650 SUPPOSITORY RECTAL EVERY 4 HOURS PRN
Status: DISCONTINUED | OUTPATIENT
Start: 2022-07-20 | End: 2022-07-22 | Stop reason: HOSPADM

## 2022-07-20 RX ORDER — HYDROCODONE BITARTRATE AND ACETAMINOPHEN 7.5; 325 MG/1; MG/1
1 TABLET ORAL EVERY 4 HOURS PRN
Status: DISCONTINUED | OUTPATIENT
Start: 2022-07-20 | End: 2022-07-21 | Stop reason: SDUPTHER

## 2022-07-20 RX ORDER — ONDANSETRON 2 MG/ML
4 INJECTION INTRAMUSCULAR; INTRAVENOUS EVERY 6 HOURS PRN
Status: DISCONTINUED | OUTPATIENT
Start: 2022-07-20 | End: 2022-07-22 | Stop reason: HOSPADM

## 2022-07-20 RX ORDER — LISINOPRIL 40 MG/1
40 TABLET ORAL DAILY
Status: DISCONTINUED | OUTPATIENT
Start: 2022-07-21 | End: 2022-07-21

## 2022-07-20 RX ORDER — ALUMINA, MAGNESIA, AND SIMETHICONE 2400; 2400; 240 MG/30ML; MG/30ML; MG/30ML
15 SUSPENSION ORAL EVERY 6 HOURS PRN
Status: DISCONTINUED | OUTPATIENT
Start: 2022-07-20 | End: 2022-07-22 | Stop reason: HOSPADM

## 2022-07-20 RX ORDER — PANTOPRAZOLE SODIUM 40 MG/10ML
40 INJECTION, POWDER, LYOPHILIZED, FOR SOLUTION INTRAVENOUS
Status: DISCONTINUED | OUTPATIENT
Start: 2022-07-21 | End: 2022-07-21

## 2022-07-20 RX ORDER — ONDANSETRON 4 MG/1
4 TABLET, FILM COATED ORAL EVERY 6 HOURS PRN
Status: DISCONTINUED | OUTPATIENT
Start: 2022-07-20 | End: 2022-07-22 | Stop reason: HOSPADM

## 2022-07-20 RX ADMIN — HEPARIN SODIUM 8.93 UNITS/KG/HR: 10000 INJECTION, SOLUTION INTRAVENOUS at 22:01

## 2022-07-20 RX ADMIN — NITROGLYCERIN 0.4 MG: 0.4 TABLET SUBLINGUAL at 20:56

## 2022-07-20 RX ADMIN — NITROGLYCERIN 1 INCH: 20 OINTMENT TOPICAL at 21:41

## 2022-07-20 RX ADMIN — HYDROCODONE BITARTRATE AND ACETAMINOPHEN 1 TABLET: 7.5; 325 TABLET ORAL at 23:34

## 2022-07-21 PROBLEM — T78.2XXA WASP STING-INDUCED ANAPHYLAXIS: Status: ACTIVE | Noted: 2022-07-21

## 2022-07-21 PROBLEM — R73.9 HYPERGLYCEMIA, DRUG-INDUCED: Status: ACTIVE | Noted: 2022-07-21

## 2022-07-21 PROBLEM — T50.905A HYPERGLYCEMIA, DRUG-INDUCED: Status: ACTIVE | Noted: 2022-07-21

## 2022-07-21 PROBLEM — T63.461A WASP STING-INDUCED ANAPHYLAXIS: Status: ACTIVE | Noted: 2022-07-21

## 2022-07-21 PROBLEM — D72.829 LEUKOCYTOSIS: Status: ACTIVE | Noted: 2022-07-21

## 2022-07-21 LAB
ALBUMIN SERPL-MCNC: 4.3 G/DL (ref 3.5–5.2)
ALP SERPL-CCNC: 58 U/L (ref 39–117)
ALT SERPL W P-5'-P-CCNC: 47 U/L (ref 1–41)
ANION GAP SERPL CALCULATED.3IONS-SCNC: 14.5 MMOL/L (ref 5–15)
APTT PPP: 25.4 SECONDS (ref 22.7–35.4)
APTT PPP: 30.8 SECONDS (ref 22.7–35.4)
AST SERPL-CCNC: 81 U/L (ref 1–40)
BASOPHILS # BLD AUTO: 0.02 10*3/MM3 (ref 0–0.2)
BASOPHILS NFR BLD AUTO: 0.1 % (ref 0–1.5)
BILIRUB CONJ SERPL-MCNC: <0.2 MG/DL (ref 0–0.3)
BILIRUB INDIRECT SERPL-MCNC: ABNORMAL MG/DL
BILIRUB SERPL-MCNC: 0.7 MG/DL (ref 0–1.2)
BUN SERPL-MCNC: 18 MG/DL (ref 6–20)
BUN/CREAT SERPL: 20 (ref 7–25)
CALCIUM SPEC-SCNC: 8.8 MG/DL (ref 8.6–10.5)
CHLORIDE SERPL-SCNC: 101 MMOL/L (ref 98–107)
CHOLEST SERPL-MCNC: 119 MG/DL (ref 0–200)
CO2 SERPL-SCNC: 23.5 MMOL/L (ref 22–29)
CREAT SERPL-MCNC: 0.9 MG/DL (ref 0.76–1.27)
DEPRECATED RDW RBC AUTO: 43.2 FL (ref 37–54)
EGFRCR SERPLBLD CKD-EPI 2021: 106 ML/MIN/1.73
EOSINOPHIL # BLD AUTO: 0 10*3/MM3 (ref 0–0.4)
EOSINOPHIL NFR BLD AUTO: 0 % (ref 0.3–6.2)
ERYTHROCYTE [DISTWIDTH] IN BLOOD BY AUTOMATED COUNT: 12.9 % (ref 12.3–15.4)
GLUCOSE SERPL-MCNC: 137 MG/DL (ref 65–99)
HBA1C MFR BLD: 5.7 % (ref 4.8–5.6)
HCT VFR BLD AUTO: 46.1 % (ref 37.5–51)
HDLC SERPL-MCNC: 26 MG/DL (ref 40–60)
HGB BLD-MCNC: 15.4 G/DL (ref 13–17.7)
IMM GRANULOCYTES # BLD AUTO: 0.12 10*3/MM3 (ref 0–0.05)
IMM GRANULOCYTES NFR BLD AUTO: 0.7 % (ref 0–0.5)
LDLC SERPL CALC-MCNC: 54 MG/DL (ref 0–100)
LDLC/HDLC SERPL: 1.72 {RATIO}
LYMPHOCYTES # BLD AUTO: 1.61 10*3/MM3 (ref 0.7–3.1)
LYMPHOCYTES NFR BLD AUTO: 9.8 % (ref 19.6–45.3)
MCH RBC QN AUTO: 30.6 PG (ref 26.6–33)
MCHC RBC AUTO-ENTMCNC: 33.4 G/DL (ref 31.5–35.7)
MCV RBC AUTO: 91.5 FL (ref 79–97)
MONOCYTES # BLD AUTO: 1 10*3/MM3 (ref 0.1–0.9)
MONOCYTES NFR BLD AUTO: 6.1 % (ref 5–12)
NEUTROPHILS NFR BLD AUTO: 13.71 10*3/MM3 (ref 1.7–7)
NEUTROPHILS NFR BLD AUTO: 83.3 % (ref 42.7–76)
NRBC BLD AUTO-RTO: 0 /100 WBC (ref 0–0.2)
PLATELET # BLD AUTO: 291 10*3/MM3 (ref 140–450)
PMV BLD AUTO: 11.6 FL (ref 6–12)
POTASSIUM SERPL-SCNC: 4.9 MMOL/L (ref 3.5–5.2)
PROT SERPL-MCNC: 6.6 G/DL (ref 6–8.5)
QT INTERVAL: 410 MS
QT INTERVAL: 434 MS
QT INTERVAL: 438 MS
RBC # BLD AUTO: 5.04 10*6/MM3 (ref 4.14–5.8)
SARS-COV-2 ORF1AB RESP QL NAA+PROBE: NOT DETECTED
SODIUM SERPL-SCNC: 139 MMOL/L (ref 136–145)
TRIGL SERPL-MCNC: 241 MG/DL (ref 0–150)
TROPONIN T SERPL-MCNC: 0.3 NG/ML (ref 0–0.03)
TROPONIN T SERPL-MCNC: 0.45 NG/ML (ref 0–0.03)
VLDLC SERPL-MCNC: 39 MG/DL (ref 5–40)
WBC NRBC COR # BLD: 16.46 10*3/MM3 (ref 3.4–10.8)

## 2022-07-21 PROCEDURE — 85730 THROMBOPLASTIN TIME PARTIAL: CPT | Performed by: INTERNAL MEDICINE

## 2022-07-21 PROCEDURE — 25010000002 METHYLPREDNISOLONE PER 125 MG: Performed by: INTERNAL MEDICINE

## 2022-07-21 PROCEDURE — C1725 CATH, TRANSLUMIN NON-LASER: HCPCS | Performed by: INTERNAL MEDICINE

## 2022-07-21 PROCEDURE — 25010000002 BH (CUPID ONLY) ADENOSINE 6 MG/100ML MIXTURE: Performed by: INTERNAL MEDICINE

## 2022-07-21 PROCEDURE — 93005 ELECTROCARDIOGRAM TRACING: CPT | Performed by: INTERNAL MEDICINE

## 2022-07-21 PROCEDURE — C9600 PERC DRUG-EL COR STENT SING: HCPCS | Performed by: INTERNAL MEDICINE

## 2022-07-21 PROCEDURE — C1769 GUIDE WIRE: HCPCS | Performed by: INTERNAL MEDICINE

## 2022-07-21 PROCEDURE — 80076 HEPATIC FUNCTION PANEL: CPT | Performed by: INTERNAL MEDICINE

## 2022-07-21 PROCEDURE — 93458 L HRT ARTERY/VENTRICLE ANGIO: CPT | Performed by: INTERNAL MEDICINE

## 2022-07-21 PROCEDURE — 027034Z DILATION OF CORONARY ARTERY, ONE ARTERY WITH DRUG-ELUTING INTRALUMINAL DEVICE, PERCUTANEOUS APPROACH: ICD-10-PCS | Performed by: INTERNAL MEDICINE

## 2022-07-21 PROCEDURE — 99215 OFFICE O/P EST HI 40 MIN: CPT | Performed by: INTERNAL MEDICINE

## 2022-07-21 PROCEDURE — C1874 STENT, COATED/COV W/DEL SYS: HCPCS | Performed by: INTERNAL MEDICINE

## 2022-07-21 PROCEDURE — C1887 CATHETER, GUIDING: HCPCS | Performed by: INTERNAL MEDICINE

## 2022-07-21 PROCEDURE — 92928 PRQ TCAT PLMT NTRAC ST 1 LES: CPT | Performed by: INTERNAL MEDICINE

## 2022-07-21 PROCEDURE — C1894 INTRO/SHEATH, NON-LASER: HCPCS | Performed by: INTERNAL MEDICINE

## 2022-07-21 PROCEDURE — G0378 HOSPITAL OBSERVATION PER HR: HCPCS

## 2022-07-21 PROCEDURE — 84484 ASSAY OF TROPONIN QUANT: CPT | Performed by: NURSE PRACTITIONER

## 2022-07-21 PROCEDURE — 0 IOPAMIDOL PER 1 ML: Performed by: INTERNAL MEDICINE

## 2022-07-21 PROCEDURE — 93010 ELECTROCARDIOGRAM REPORT: CPT | Performed by: INTERNAL MEDICINE

## 2022-07-21 PROCEDURE — 25010000002 DIPHENHYDRAMINE PER 50 MG: Performed by: INTERNAL MEDICINE

## 2022-07-21 PROCEDURE — 25010000002 MIDAZOLAM PER 1 MG: Performed by: INTERNAL MEDICINE

## 2022-07-21 PROCEDURE — 4A023N7 MEASUREMENT OF CARDIAC SAMPLING AND PRESSURE, LEFT HEART, PERCUTANEOUS APPROACH: ICD-10-PCS | Performed by: INTERNAL MEDICINE

## 2022-07-21 PROCEDURE — 80048 BASIC METABOLIC PNL TOTAL CA: CPT | Performed by: INTERNAL MEDICINE

## 2022-07-21 PROCEDURE — 80061 LIPID PANEL: CPT | Performed by: INTERNAL MEDICINE

## 2022-07-21 PROCEDURE — 85347 COAGULATION TIME ACTIVATED: CPT

## 2022-07-21 PROCEDURE — 85025 COMPLETE CBC W/AUTO DIFF WBC: CPT | Performed by: NURSE PRACTITIONER

## 2022-07-21 PROCEDURE — 85730 THROMBOPLASTIN TIME PARTIAL: CPT | Performed by: NURSE PRACTITIONER

## 2022-07-21 PROCEDURE — 25010000002 FENTANYL CITRATE (PF) 50 MCG/ML SOLUTION: Performed by: INTERNAL MEDICINE

## 2022-07-21 PROCEDURE — B2151ZZ FLUOROSCOPY OF LEFT HEART USING LOW OSMOLAR CONTRAST: ICD-10-PCS | Performed by: INTERNAL MEDICINE

## 2022-07-21 PROCEDURE — 25010000002 HEPARIN (PORCINE) PER 1000 UNITS: Performed by: INTERNAL MEDICINE

## 2022-07-21 PROCEDURE — B2111ZZ FLUOROSCOPY OF MULTIPLE CORONARY ARTERIES USING LOW OSMOLAR CONTRAST: ICD-10-PCS | Performed by: INTERNAL MEDICINE

## 2022-07-21 DEVICE — XIENCE SKYPOINT™ EVEROLIMUS ELUTING CORONARY STENT SYSTEM 3.00 MM X 23 MM / RAPID-EXCHANGE
Type: IMPLANTABLE DEVICE | Site: HEART | Status: FUNCTIONAL
Brand: XIENCE SKYPOINT™

## 2022-07-21 RX ORDER — LISINOPRIL 20 MG/1
20 TABLET ORAL DAILY
Status: DISCONTINUED | OUTPATIENT
Start: 2022-07-21 | End: 2022-07-22 | Stop reason: HOSPADM

## 2022-07-21 RX ORDER — ATORVASTATIN CALCIUM 20 MG/1
40 TABLET, FILM COATED ORAL DAILY
Status: DISCONTINUED | OUTPATIENT
Start: 2022-07-21 | End: 2022-07-22 | Stop reason: HOSPADM

## 2022-07-21 RX ORDER — FENTANYL CITRATE 50 UG/ML
INJECTION, SOLUTION INTRAMUSCULAR; INTRAVENOUS AS NEEDED
Status: DISCONTINUED | OUTPATIENT
Start: 2022-07-21 | End: 2022-07-21 | Stop reason: HOSPADM

## 2022-07-21 RX ORDER — SODIUM CHLORIDE 9 MG/ML
125 INJECTION, SOLUTION INTRAVENOUS CONTINUOUS
Status: ACTIVE | OUTPATIENT
Start: 2022-07-21 | End: 2022-07-21

## 2022-07-21 RX ORDER — ATORVASTATIN CALCIUM 20 MG/1
40 TABLET, FILM COATED ORAL NIGHTLY
Status: DISCONTINUED | OUTPATIENT
Start: 2022-07-21 | End: 2022-07-21 | Stop reason: SDUPTHER

## 2022-07-21 RX ORDER — METHYLPREDNISOLONE SODIUM SUCCINATE 125 MG/2ML
60 INJECTION, POWDER, LYOPHILIZED, FOR SOLUTION INTRAMUSCULAR; INTRAVENOUS EVERY 8 HOURS
Status: DISCONTINUED | OUTPATIENT
Start: 2022-07-21 | End: 2022-07-22

## 2022-07-21 RX ORDER — ACETAMINOPHEN 325 MG/1
650 TABLET ORAL EVERY 4 HOURS PRN
Status: DISCONTINUED | OUTPATIENT
Start: 2022-07-21 | End: 2022-07-21 | Stop reason: SDUPTHER

## 2022-07-21 RX ORDER — ONDANSETRON 4 MG/1
4 TABLET, FILM COATED ORAL EVERY 6 HOURS PRN
Status: DISCONTINUED | OUTPATIENT
Start: 2022-07-21 | End: 2022-07-21 | Stop reason: SDUPTHER

## 2022-07-21 RX ORDER — ASPIRIN 81 MG/1
81 TABLET ORAL DAILY
Status: DISCONTINUED | OUTPATIENT
Start: 2022-07-21 | End: 2022-07-22 | Stop reason: HOSPADM

## 2022-07-21 RX ORDER — ONDANSETRON 2 MG/ML
4 INJECTION INTRAMUSCULAR; INTRAVENOUS EVERY 6 HOURS PRN
Status: DISCONTINUED | OUTPATIENT
Start: 2022-07-21 | End: 2022-07-21 | Stop reason: SDUPTHER

## 2022-07-21 RX ORDER — DIPHENHYDRAMINE HYDROCHLORIDE 50 MG/ML
25 INJECTION INTRAMUSCULAR; INTRAVENOUS EVERY 6 HOURS
Status: DISCONTINUED | OUTPATIENT
Start: 2022-07-21 | End: 2022-07-22

## 2022-07-21 RX ORDER — HEPARIN SODIUM 1000 [USP'U]/ML
INJECTION, SOLUTION INTRAVENOUS; SUBCUTANEOUS AS NEEDED
Status: DISCONTINUED | OUTPATIENT
Start: 2022-07-21 | End: 2022-07-21 | Stop reason: HOSPADM

## 2022-07-21 RX ORDER — SODIUM CHLORIDE 9 MG/ML
INJECTION, SOLUTION INTRAVENOUS CONTINUOUS PRN
Status: COMPLETED | OUTPATIENT
Start: 2022-07-21 | End: 2022-07-21

## 2022-07-21 RX ORDER — LIDOCAINE HYDROCHLORIDE 20 MG/ML
INJECTION, SOLUTION INFILTRATION; PERINEURAL AS NEEDED
Status: DISCONTINUED | OUTPATIENT
Start: 2022-07-21 | End: 2022-07-21 | Stop reason: HOSPADM

## 2022-07-21 RX ORDER — CLOPIDOGREL BISULFATE 75 MG/1
75 TABLET ORAL DAILY
Status: DISCONTINUED | OUTPATIENT
Start: 2022-07-22 | End: 2022-07-21 | Stop reason: SDUPTHER

## 2022-07-21 RX ORDER — SODIUM NITROPRUSSIDE 25 MG/ML
INJECTION INTRAVENOUS AS NEEDED
Status: DISCONTINUED | OUTPATIENT
Start: 2022-07-21 | End: 2022-07-21 | Stop reason: HOSPADM

## 2022-07-21 RX ORDER — CLOPIDOGREL BISULFATE 75 MG/1
75 TABLET ORAL DAILY
Status: DISCONTINUED | OUTPATIENT
Start: 2022-07-21 | End: 2022-07-22 | Stop reason: HOSPADM

## 2022-07-21 RX ORDER — CLOPIDOGREL BISULFATE 75 MG/1
TABLET ORAL AS NEEDED
Status: DISCONTINUED | OUTPATIENT
Start: 2022-07-21 | End: 2022-07-21 | Stop reason: HOSPADM

## 2022-07-21 RX ORDER — NAPROXEN 500 MG/1
500 TABLET ORAL ONCE
Refills: 2 | Status: COMPLETED | OUTPATIENT
Start: 2022-07-21 | End: 2022-07-21

## 2022-07-21 RX ORDER — FAMOTIDINE 10 MG/ML
20 INJECTION, SOLUTION INTRAVENOUS EVERY 12 HOURS SCHEDULED
Status: DISCONTINUED | OUTPATIENT
Start: 2022-07-21 | End: 2022-07-22

## 2022-07-21 RX ORDER — HYDROCODONE BITARTRATE AND ACETAMINOPHEN 5; 325 MG/1; MG/1
1 TABLET ORAL EVERY 4 HOURS PRN
Status: DISCONTINUED | OUTPATIENT
Start: 2022-07-21 | End: 2022-07-22 | Stop reason: HOSPADM

## 2022-07-21 RX ORDER — MIDAZOLAM HYDROCHLORIDE 1 MG/ML
INJECTION INTRAMUSCULAR; INTRAVENOUS AS NEEDED
Status: DISCONTINUED | OUTPATIENT
Start: 2022-07-21 | End: 2022-07-21 | Stop reason: HOSPADM

## 2022-07-21 RX ADMIN — FAMOTIDINE 20 MG: 10 INJECTION, SOLUTION INTRAVENOUS at 21:26

## 2022-07-21 RX ADMIN — DIPHENHYDRAMINE HYDROCHLORIDE 25 MG: 50 INJECTION, SOLUTION INTRAMUSCULAR; INTRAVENOUS at 22:52

## 2022-07-21 RX ADMIN — METOPROLOL TARTRATE 12.5 MG: 25 TABLET, FILM COATED ORAL at 00:00

## 2022-07-21 RX ADMIN — DIPHENHYDRAMINE HYDROCHLORIDE 25 MG: 50 INJECTION, SOLUTION INTRAMUSCULAR; INTRAVENOUS at 05:17

## 2022-07-21 RX ADMIN — FAMOTIDINE 20 MG: 10 INJECTION, SOLUTION INTRAVENOUS at 01:05

## 2022-07-21 RX ADMIN — HYDROCODONE BITARTRATE AND ACETAMINOPHEN 1 TABLET: 5; 325 TABLET ORAL at 18:49

## 2022-07-21 RX ADMIN — NAPROXEN 500 MG: 500 TABLET ORAL at 21:26

## 2022-07-21 RX ADMIN — CLOPIDOGREL 300 MG: 75 TABLET, FILM COATED ORAL at 00:00

## 2022-07-21 RX ADMIN — METHYLPREDNISOLONE SODIUM SUCCINATE 60 MG: 125 INJECTION, POWDER, FOR SOLUTION INTRAMUSCULAR; INTRAVENOUS at 08:15

## 2022-07-21 RX ADMIN — METHYLPREDNISOLONE SODIUM SUCCINATE 60 MG: 125 INJECTION, POWDER, FOR SOLUTION INTRAMUSCULAR; INTRAVENOUS at 22:52

## 2022-07-21 RX ADMIN — METOPROLOL TARTRATE 12.5 MG: 25 TABLET, FILM COATED ORAL at 08:25

## 2022-07-21 RX ADMIN — CLOPIDOGREL 75 MG: 75 TABLET, FILM COATED ORAL at 08:15

## 2022-07-21 RX ADMIN — HYDROCODONE BITARTRATE AND ACETAMINOPHEN 1 TABLET: 5; 325 TABLET ORAL at 22:52

## 2022-07-21 RX ADMIN — ASPIRIN 325 MG: 325 TABLET ORAL at 08:15

## 2022-07-21 RX ADMIN — HYDROCODONE BITARTRATE AND ACETAMINOPHEN 1 TABLET: 7.5; 325 TABLET ORAL at 05:22

## 2022-07-21 RX ADMIN — LISINOPRIL 20 MG: 20 TABLET ORAL at 08:15

## 2022-07-21 RX ADMIN — ATORVASTATIN CALCIUM 40 MG: 20 TABLET, FILM COATED ORAL at 01:20

## 2022-07-21 RX ADMIN — METHYLPREDNISOLONE SODIUM SUCCINATE 60 MG: 125 INJECTION, POWDER, FOR SOLUTION INTRAMUSCULAR; INTRAVENOUS at 18:48

## 2022-07-21 RX ADMIN — HYDROCODONE BITARTRATE AND ACETAMINOPHEN 1 TABLET: 7.5; 325 TABLET ORAL at 09:05

## 2022-07-21 RX ADMIN — METOPROLOL TARTRATE 12.5 MG: 25 TABLET, FILM COATED ORAL at 21:26

## 2022-07-21 RX ADMIN — DIPHENHYDRAMINE HYDROCHLORIDE 25 MG: 50 INJECTION, SOLUTION INTRAMUSCULAR; INTRAVENOUS at 01:05

## 2022-07-21 RX ADMIN — DIPHENHYDRAMINE HYDROCHLORIDE 25 MG: 50 INJECTION, SOLUTION INTRAMUSCULAR; INTRAVENOUS at 18:50

## 2022-07-21 RX ADMIN — HYDROCODONE BITARTRATE AND ACETAMINOPHEN 1 TABLET: 5; 325 TABLET ORAL at 13:26

## 2022-07-21 RX ADMIN — METHYLPREDNISOLONE SODIUM SUCCINATE 60 MG: 125 INJECTION, POWDER, FOR SOLUTION INTRAMUSCULAR; INTRAVENOUS at 01:05

## 2022-07-21 RX ADMIN — FAMOTIDINE 20 MG: 10 INJECTION, SOLUTION INTRAVENOUS at 08:24

## 2022-07-21 NOTE — PROGRESS NOTES
Name: Heber Harley ADMIT: 2022   : 1975  PCP: Deya Pack APRN    MRN: [id number] LOS: 0 days   AGE/SEX: 47 y.o. male  ROOM: 2206/1     Subjective   Subjective   Still c/o chest pressure, SOA, and nausea. No palp, diaphoresis, vomiting.   Left ear feeling better and swelling in face much improved. No stridor, speech changes, dysphagia, hearing changes.    Review of Systems   as above    Objective   Objective   Vital Signs  Temp:  [98.2 °F (36.8 °C)-98.4 °F (36.9 °C)] 98.3 °F (36.8 °C)  Heart Rate:  [61-73] 61  Resp:  [17] 17  BP: (113-140)/(60-92) 114/83  SpO2:  [94 %-96 %] 94 %  on  Flow (L/min):  [2] 2;   Device (Oxygen Therapy): room air  Body mass index is 34.84 kg/m².  Physical Exam  Vitals and nursing note reviewed. Exam conducted with a chaperone present (Family x 3).   Constitutional:       General: He is not in acute distress.     Appearance: He is obese. He is not ill-appearing, toxic-appearing or diaphoretic.   HENT:      Head: Normocephalic and atraumatic.      Right Ear: External ear normal.      Left Ear: External ear normal.      Ears:      Comments: Left ear looks fine to me today     Nose: Nose normal.      Mouth/Throat:      Mouth: Mucous membranes are moist.      Pharynx: Oropharynx is clear.   Eyes:      General: No scleral icterus.        Right eye: No discharge.         Left eye: No discharge.      Extraocular Movements: Extraocular movements intact.      Conjunctiva/sclera: Conjunctivae normal.   Cardiovascular:      Rate and Rhythm: Normal rate and regular rhythm.      Pulses: Normal pulses.      Heart sounds: Normal heart sounds.   Pulmonary:      Effort: Pulmonary effort is normal. No respiratory distress.      Breath sounds: Normal breath sounds. No stridor. No wheezing or rales.   Chest:      Chest wall: No tenderness.   Abdominal:      General: Bowel sounds are normal. There is no distension.      Palpations: Abdomen is soft.      Tenderness: There is no  abdominal tenderness.   Musculoskeletal:         General: No swelling or deformity. Normal range of motion.      Cervical back: Neck supple. No rigidity.   Lymphadenopathy:      Cervical: No cervical adenopathy.   Skin:     General: Skin is warm and dry.      Capillary Refill: Capillary refill takes less than 2 seconds.      Coloration: Skin is not jaundiced.   Neurological:      General: No focal deficit present.      Mental Status: He is alert and oriented to person, place, and time. Mental status is at baseline.      Cranial Nerves: No cranial nerve deficit.      Coordination: Coordination normal.      Gait: Gait normal.   Psychiatric:         Mood and Affect: Mood normal.         Behavior: Behavior normal.         Thought Content: Thought content normal.         Judgment: Judgment normal.         Results Review     I reviewed the patient's new clinical results.  Results from last 7 days   Lab Units 07/21/22  0258 07/20/22 2159   WBC 10*3/mm3 16.46* 14.02*   HEMOGLOBIN g/dL 15.4 16.2   PLATELETS 10*3/mm3 291 282     Results from last 7 days   Lab Units 07/21/22  0258 07/20/22  2159   SODIUM mmol/L 139 140   POTASSIUM mmol/L 4.9 4.7   CHLORIDE mmol/L 101 103   CO2 mmol/L 23.5 20.5*   BUN mg/dL 18 15   CREATININE mg/dL 0.90 0.73*   GLUCOSE mg/dL 137* 112*   EGFR mL/min/1.73 106.0 112.9     Results from last 7 days   Lab Units 07/21/22  0258 07/20/22 2159   ALBUMIN g/dL 4.30 4.70   BILIRUBIN mg/dL 0.7 0.7   ALK PHOS U/L 58 61   AST (SGOT) U/L 81* 69*   ALT (SGPT) U/L 47* 51*     Results from last 7 days   Lab Units 07/21/22  0258 07/20/22  2159   CALCIUM mg/dL 8.8 9.3   ALBUMIN g/dL 4.30 4.70       Hemoglobin A1C   Date/Time Value Ref Range Status   07/20/2022 2159 5.70 (H) 4.80 - 5.60 % Final       No radiology results for the last day  Scheduled Medications  aspirin, 325 mg, Oral, Daily  atorvastatin, 40 mg, Oral, Daily  clopidogrel, 75 mg, Oral, Daily  diphenhydrAMINE, 25 mg, Intravenous, Q6H  famotidine, 20  mg, Intravenous, Q12H  lisinopril, 20 mg, Oral, Daily  methylPREDNISolone sodium succinate, 60 mg, Intravenous, Q8H  metoprolol tartrate, 12.5 mg, Oral, Q12H  nitroglycerin, 1 inch, Topical, Q6H    Infusions  heparin, 8.93 Units/kg/hr, Last Rate: 11.93 Units/kg/hr (07/21/22 0904)    Diet  NPO Diet NPO Type: Sips with Meds       Assessment/Plan     Active Hospital Problems    Diagnosis  POA   • **Chest pain [R07.9]  Yes   • Wasp sting-induced anaphylaxis [T63.461A]  Yes   • Leukocytosis [D72.829]  Yes   • Hyperglycemia, drug-induced [R73.9, T50.905A]  Yes   • HLD (hyperlipidemia) [E78.5]  Yes   • Elevated troponin [R77.8]  Yes   • EVAN (obstructive sleep apnea) [G47.33]  Yes   • Obesity (BMI 30-39.9) [E66.9]  Yes   • Essential hypertension [I10]  Yes      Resolved Hospital Problems   No resolved problems to display.       47 y.o. male admitted with Chest pain and elevated Trop in setting of treatment for wasp sting/anaphylaxis.    Left pinna wasp sting leading to anaphylaxis.    Patient is status post Epi injection/IV Pepcid/IV SoluMedrol/IV Benadryl at outside ER with good results. Continue IV Solu-Medrol/IV Benadryl/IV Pepcid here. Should not need to continue at dc. Will need Epi-Pen at dc.    Chest pain/elevated troponin (demand ischemia vs non-ST elevation MI?).  Patient had a stress test on 5/30/2017 that revealed a normal ejection fraction with normal perfusion and no ischemia. Patient had a 2D echo on 7/1/2022 revealing a normal ejection fraction and normal diastolic dysfunction and no significant valvular disease. Continue heparin gtt/ASA/nitro paste/lisinopril/Plavix/metoprolol. Cardiology consult pending. Trop trending up 0.155>>0.447. EKGs pretty benign. Still symptomatic. Heart cath planned for later today per pt (await Card documentation).    Leukocytosis.  Suspect due to stress reaction as well as steroids. No s/sx of infection. Continue to monitor.    Hyperlipidemia.  Continue ASA. Started Lipitor.  Reviewed lipid panel--trigs high and HDL low.     Hypertension.   Good control on lisinopril. Have added low-dose beta-blocker given his presentation.     Hyperglycemia.  A1c only 5.7. Suspect due to steroids. Continue to monitor.     Elevated liver function test.    Benign GI examination. Suspect due to fatty liver. Continue to monitor.       · Heparin gtt should suffice for DVT prophylaxis.  · Full code.  · Discussed with patient and family x 3.  · Anticipate discharge home with family, timing yet to be determined.      Carlin Scherer MD  [facility]  07/21/22  09:29 EDT

## 2022-07-21 NOTE — CASE MANAGEMENT/SOCIAL WORK
Discharge Planning Assessment  [facility]     Patient Name: Heber Harley  MRN: [id number]  Today's Date: 7/21/2022    Admit Date: 7/20/2022     Discharge Needs Assessment     Row Name 07/21/22 1117       Living Environment    People in Home spouse    Current Living Arrangements home    Primary Care Provided by self    Able to Return to Prior Arrangements yes               Discharge Plan     Row Name 07/21/22 1117       Plan    Plan Plans are home.    Plan Comments CCP spoke with pt and wife, Bettye in room to confirm facesheet info and pharmacy info.  Pt uses  [facility] as pharmacy. Pt is [facility].  Explained that CCP would cont to follow for assist with any kind of dc need.   Kimi UNDERWOOD RN/CCP              Continued Care and Services - Admitted Since 7/20/2022    Coordination has not been started for this encounter.     Selected Continued Care - Episodes Includes selections from active Coordinated Care Management episodes    Rising Risk Care Management Episode start date: 7/21/2022   There are no active outsourced providers for this episode.               Expected Discharge Date and Time     Expected Discharge Date Expected Discharge Time    Jul 22, 2022          Demographic Summary    No documentation.                Functional Status     Row Name 07/21/22 1117       Functional Status    Usual Activity Tolerance excellent    Current Activity Tolerance excellent               Psychosocial    No documentation.                Abuse/Neglect    No documentation.                Legal    No documentation.                Substance Abuse    No documentation.                Patient Forms    No documentation.                   Kimi Champion RN

## 2022-07-21 NOTE — H&P
Patient Name:  Heber Harley  YOB: 1975  MRN:  [id number]  Admit Date:  7/20/2022  Patient Care Team:  Deya Pack APRN as PCP - General (Nurse Practitioner)      Subjective   History Present Illness     CC: Chest pain s/p wasp sting     History of Present Illness   Mr. Harley is a 47 y.o. former smoker with a history of essential hypertension, HLD, obesity, EVAN on CPAP, and obesity that has transferred here from [facility] secondary due to chest pain.  Patient reports around 8:30 AM, he was stung by wasp on his left ear.  He suddenly developed swelling to his ear and face as well as shortness of breath.  He went to the emergency department where he was treated for anaphylaxis.  He was given a DuoNeb treatment, epi, IV steroids, and IV Benadryl.  He then started having chest pressure and his troponins were elevated. He was started on a Heparin gtt and has transferred here to be evaluated by his cardiologist.    Review of Systems   Constitutional: Negative for chills and fever.   HENT: Negative for congestion and rhinorrhea.    Eyes: Negative for photophobia and visual disturbance.   Respiratory: Negative for cough and shortness of breath.    Cardiovascular: Positive for chest pain. Negative for palpitations.   Gastrointestinal: Negative for constipation, diarrhea, nausea and vomiting.   Endocrine: Negative for cold intolerance and heat intolerance.   Genitourinary: Negative for difficulty urinating and dysuria.   Musculoskeletal: Negative for gait problem and joint swelling.   Skin: Negative for rash and wound.   Neurological: Negative for dizziness, light-headedness and headaches.   Psychiatric/Behavioral: Negative for sleep disturbance and suicidal ideas.        Personal History     Past Medical History:   Diagnosis Date   • Chronic back pain     COMMON WEALTH PN MGMT [place], NARC RX   • GERD (gastroesophageal reflux disease)    • Hyperlipidemia    • Hypertension    •  Hypertriglyceridemia    • Sleep apnea     On CPAP     Past Surgical History:   Procedure Laterality Date   • ANKLE SURGERY Right     ORIF   • HEMORRHOIDECTOMY N/A 11/3/2021    Procedure: HEMORRHOID BANDING;  Surgeon: Matthew Coronel MD;  Location: [facility];  Service: General;  Laterality: N/A;     Family History   Problem Relation Age of Onset   • Hypertension Mother    • Coronary artery disease Mother         Mother with stent   • Heart disease Father         Father with MI and 3 vessel CABG in upper 50's   • Hypertension Father    • Diabetes Maternal Uncle    • Cancer Maternal Uncle    • Cancer Paternal Grandfather    • Malig Hyperthermia Neg Hx      Social History     Tobacco Use   • Smoking status: Former Smoker     Years: 5.00     Quit date:      Years since quittin.5   • Smokeless tobacco: Current User   • Tobacco comment: Caffeine - 2 daily   Vaping Use   • Vaping Use: Never used   Substance Use Topics   • Alcohol use: Yes     Alcohol/week: 12.0 standard drinks     Types: 12 Cans of beer per week   • Drug use: Yes     Frequency: 7.0 times per week     Types: Marijuana     Comment: daily/PAIN MGMT RX NARC CH BACK PAIN     No current facility-administered medications on file prior to encounter.     Current Outpatient Medications on File Prior to Encounter   Medication Sig Dispense Refill   • aspirin 325 MG tablet Take 325 mg by mouth Daily.     • Calcium Carb-Cholecalciferol (Calcium + D3) 600-800 MG-UNIT tablet take 1 tablet by mouth twice daily 180 tablet 3   • diclofenac (VOLTAREN) 75 MG EC tablet May take 1 tablet by mouth twice daily as needed for pain 60 tablet 2   • doxycycline (VIBRAMYCIN) 100 MG capsule Take 1 capsule by mouth Every 12 (Twelve) Hours. 28 capsule 0   • HYDROcodone-acetaminophen (NORCO) 7.5-325 MG per tablet TAKE 1 TABLET 3 TIMES DAILY AS NEEDED FOR PAIN.*MAY TAKE EXTRA IF NEEDED* 100 tablet 0   • lisinopril (PRINIVIL,ZESTRIL) 40 MG tablet Take 1 tablet by mouth Daily.  (Patient taking differently: Take 40 mg by mouth Daily. Not started yet) 90 tablet 0   • nitroglycerin (NITROSTAT) 0.4 MG SL tablet Place 0.4 mg under the tongue Every 5 (Five) Minutes As Needed for Chest Pain. Take no more than 3 doses in 15 minutes.     • Omega-3 Fatty Acids (FISH OIL) 1000 MG capsule capsule Take 2,000 mg by mouth 2 (Two) Times a Day With Meals. INST PER ANESTHESIA PROTOCOL     • omeprazole (priLOSEC) 20 MG capsule TAKE ONE CAPSULE BY MOUTH TWICE A  capsule 1   • predniSONE (DELTASONE) 10 MG tablet Take 1 tablet by mouth Daily With Breakfast & Lunch for 5 days, THEN 1 tablet Every Morning Before Breakfast for 5 days. 15 tablet 0   • sildenafil (REVATIO) 20 MG tablet Take 1-2 tablets by mouth 1 hour prior to intercourse 30 tablet 2   • vitamin D (ERGOCALCIFEROL) 1.25 MG (94784 UT) capsule capsule Take 1 capsule by mouth 1 (One) Time Per Week. 12 capsule 3   • Vortioxetine HBr (Trintellix) 10 MG tablet TAKE 1 TABLET BY MOUTH EVERY DAY 90 tablet 1   • Bempedoic Acid-Ezetimibe (Nexlizet) 180-10 MG tablet Take 1 tablet by mouth Daily. 90 tablet 1   • HYDROcodone-acetaminophen (NORCO) 7.5-325 MG per tablet TAKE 1 TABLET 3 TIMES DAILY AS NEEDED FOR PAIN.*MAY TAKE EXTRA IF NEEDED* 100 tablet 0   • terbinafine (lamiSIL) 250 MG tablet Take 1 tablet by mouth Daily. 90 tablet 0   • Testosterone 30 MG/ACT solution 1 PUMP ON EACH AXILLA DAILY (Patient not taking: Reported on 7/20/2022) 90 mL 5   • Testosterone Cypionate (DEPOTESTOTERONE CYPIONATE) 200 MG/ML injection Inject 1 mL into the appropriate muscle as directed by prescriber Every 14 (Fourteen) Days. (Patient not taking: Reported on 7/20/2022) 2 mL 5     Allergies   Allergen Reactions   • Wasp Venom Anaphylaxis       Objective    Objective     Vital Signs  Temp:  [98.2 °F (36.8 °C)] 98.2 °F (36.8 °C)  Heart Rate:  [63-68] 68  Resp:  [17] 17  BP: (129-140)/(84-92) 129/84  SpO2:  [96 %] 96 %  on  Flow (L/min):  [2] 2;   Device (Oxygen Therapy):  nasal cannula  Body mass index is 34.84 kg/m².    Physical Exam  Vitals reviewed.   Constitutional:       General: He is not in acute distress.     Appearance: He is well-developed. He is obese. He is not toxic-appearing.   HENT:      Head: Normocephalic and atraumatic.      Comments: Swelling and erythema noted to the left face  Eyes:      General: No scleral icterus.        Right eye: No discharge.         Left eye: No discharge.      Conjunctiva/sclera: Conjunctivae normal.   Neck:      Vascular: No JVD.   Cardiovascular:      Rate and Rhythm: Normal rate and regular rhythm.      Heart sounds: Normal heart sounds. No murmur heard.    No friction rub. No gallop.   Pulmonary:      Effort: Pulmonary effort is normal. No respiratory distress.      Breath sounds: Normal breath sounds. No wheezing or rales.   Abdominal:      General: Bowel sounds are normal. There is no distension.      Palpations: Abdomen is soft.      Tenderness: There is no abdominal tenderness. There is no guarding.   Musculoskeletal:         General: No tenderness or deformity. Normal range of motion.      Cervical back: Normal range of motion and neck supple.   Skin:     General: Skin is warm and dry.      Capillary Refill: Capillary refill takes less than 2 seconds.   Neurological:      Mental Status: He is alert and oriented to person, place, and time.   Psychiatric:         Behavior: Behavior normal.       Results Review:  I reviewed the patient's new clinical results.    Lab Results (last 24 hours)     Procedure Component Value Units Date/Time    Comprehensive Metabolic Panel [[id number]]  (Abnormal) Collected: 07/20/22 2159    Specimen: Blood Updated: 07/20/22 2313     Glucose 112 mg/dL      BUN 15 mg/dL      Creatinine 0.73 mg/dL      Sodium 140 mmol/L      Potassium 4.7 mmol/L      Chloride 103 mmol/L      CO2 20.5 mmol/L      Calcium 9.3 mg/dL      Total Protein 7.0 g/dL      Albumin 4.70 g/dL      ALT (SGPT) 51 U/L      AST (SGOT) 69 U/L       Alkaline Phosphatase 61 U/L      Total Bilirubin 0.7 mg/dL      Globulin 2.3 gm/dL      A/G Ratio 2.0 g/dL      BUN/Creatinine Ratio 20.5     Anion Gap 16.5 mmol/L      eGFR 112.9 mL/min/1.73      Comment: National Kidney Foundation and American Society of Nephrology (ASN) Task Force recommended calculation based on the Chronic Kidney Disease Epidemiology Collaboration (CKD-EPI) equation refit without adjustment for race.       Narrative:      GFR Normal >60  Chronic Kidney Disease <60  Kidney Failure <15      Troponin [[id number]]  (Abnormal) Collected: 07/20/22 2159    Specimen: Blood Updated: 07/20/22 2315     Troponin T 0.155 ng/mL     Narrative:      Troponin T Reference Range:  <= 0.03 ng/mL-   Negative for AMI  >0.03 ng/mL-     Abnormal for myocardial necrosis.  Clinicians would have to utilize clinical acumen, EKG, Troponin and serial changes to determine if it is an Acute Myocardial Infarction or myocardial injury due to an underlying chronic condition.       Results may be falsely decreased if patient taking Biotin.      Protime-INR [[id number]]  (Abnormal) Collected: 07/20/22 2159    Specimen: Blood Updated: 07/20/22 2247     Protime 14.8 Seconds      INR 1.17    aPTT [[id number]]  (Normal) Collected: 07/20/22 2159    Specimen: Blood Updated: 07/20/22 2247     PTT 25.2 seconds     CBC & Differential [[id number]]  (Abnormal) Collected: 07/20/22 2159    Specimen: Blood Updated: 07/20/22 2240    Narrative:      The following orders were created for panel order CBC & Differential.  Procedure                               Abnormality         Status                     ---------                               -----------         ------                     CBC Auto Differential[[id number]]        Abnormal            Final result                 Please view results for these tests on the individual orders.    CBC Auto Differential [[id number]]  (Abnormal) Collected: 07/20/22 2159    Specimen: Blood Updated: 07/20/22  2240     WBC 14.02 10*3/mm3      RBC 5.28 10*6/mm3      Hemoglobin 16.2 g/dL      Hematocrit 46.5 %      MCV 88.1 fL      MCH 30.7 pg      MCHC 34.8 g/dL      RDW 12.5 %      RDW-SD 39.3 fl      MPV 11.4 fL      Platelets 282 10*3/mm3      Neutrophil % 86.3 %      Lymphocyte % 9.3 %      Monocyte % 3.4 %      Eosinophil % 0.0 %      Basophil % 0.2 %      Immature Grans % 0.8 %      Neutrophils, Absolute 12.09 10*3/mm3      Lymphocytes, Absolute 1.31 10*3/mm3      Monocytes, Absolute 0.48 10*3/mm3      Eosinophils, Absolute 0.00 10*3/mm3      Basophils, Absolute 0.03 10*3/mm3      Immature Grans, Absolute 0.11 10*3/mm3      nRBC 0.0 /100 WBC     COVID PRE-OP / PRE-PROCEDURE SCREENING ORDER (NO ISOLATION) - Swab, Nasopharynx [[id number]] Collected: 07/20/22 2209    Specimen: Swab from Nasopharynx Updated: 07/20/22 2221    Narrative:      The following orders were created for panel order COVID PRE-OP / PRE-PROCEDURE SCREENING ORDER (NO ISOLATION) - Swab, Nasopharynx.  Procedure                               Abnormality         Status                     ---------                               -----------         ------                     COVID-19,APTIMA PANTHER(...[[id number]]                      In process                   Please view results for these tests on the individual orders.    COVID-19,APTIMA PANTHER(CHANEL), RADHA/ ARTEMIO, NP/OP SWAB IN UTM/VTM/SALINE TRANSPORT MEDIA,24 HR TAT - Swab, Nasopharynx [[id number]] Collected: 07/20/22 2209    Specimen: Swab from Nasopharynx Updated: 07/20/22 2221          Imaging Results (Last 24 Hours)     ** No results found for the last 24 hours. **          Results for orders placed during the hospital encounter of 07/01/22    Adult Transthoracic Echo Complete w/ Color, Spectral and Contrast if Necessary Per Protocol    Interpretation Summary  · Calculated left ventricular EF = 63% Calculated left ventricular 3D EF = 56% Estimated left ventricular EF was in agreement with the  calculated left ventricular EF. Left ventricular systolic function is normal.  · Left ventricular diastolic function was normal.  · Sclerotic trileaflet aortic valve with mild calcification of the noncoronary cusp noted without any significant stenosis or regurgitation.  · Normal right ventricular size and function.      ECG 12 Lead   Preliminary Result   HEART RATE= 61  bpm   RR Interval= 980  ms   DE Interval= 138  ms   P Horizontal Axis= 30  deg   P Front Axis= 16  deg   QRSD Interval= 105  ms   QT Interval= 434  ms   QRS Axis= 71  deg   T Wave Axis= 58  deg   - NORMAL ECG -   Sinus rhythm   Electronically Signed By:    Date and Time of Study: 2022-07-20 20:43:42      ECG 12 Lead    (Results Pending)        Assessment/Plan     Active Hospital Problems    Diagnosis  POA   • **Chest pain [R07.9]  Yes   • Wasp sting-induced anaphylaxis [T63.461A]  Yes   • HLD (hyperlipidemia) [E78.5]  Yes   • Elevated troponin [R77.8]  Yes   • EVAN (obstructive sleep apnea) [G47.33]  Yes   • Obesity (BMI 30-39.9) [E66.9]  Yes   • Essential hypertension [I10]  Yes      Resolved Hospital Problems   No resolved problems to display.       Mr. Harley is a 47 y.o. former smoker with a history of hypertension, HLD, EVAN, and obesity who presents with chest pain.    Chest pain  -Chest pressure improving with Nitropaste.  -N.p.o. after midnight  -Heparin gtt  -ASA, Plavix, and beta blocker   -Cardiology consult  -Continue Nitropaste to chest  -Elevated troponin without facility, will continue to trend     Anaphylaxis  -Secondary to a wasp sting. He still has significant swelling to the right side of face  -IV steroids q8  -IV benadryl q6    Essential hypertension  -Blood pressure stable, will resume Lisinopril      EVAN  -May use home CPAP when available      I discussed the patient's findings and my recommendations with patient, family, nursing staff and Dr. Jacobs.    VTE Prophylaxis - Heparin gtt.  Code Status - Full code.       Lazara BURR  TAMELA Rodas  [facility]  07/20/22  23:30 EDT

## 2022-07-21 NOTE — CONSULTS
[facility] Consult Note       Encounter Date:22  Patient:Heber Harley  :1975  MRN:[id number]    Date of Admission: 2022  Date of Encounter Visit: 22  Encounter Provider: Jonny Melton MD  Referring Provider: No Known Provider  Place of Service: [facility]  Patient Care Team:  Deya Pack APRN as PCP - General (Nurse Practitioner)      Consulted for: Chest pain    Chief Complaint:  Chest pain      History of Presenting Illness:      Mr. Harley is a 47 y.o. gentleman who follows with Dr. Velasquez with a past medical history notable for tobacco abuse, obesity, gastric reflux disease, obstructive sleep apnea, hypertension, mixed upper lipidemia, and prior chest pain.  He presented to [facility] yesterday after experiencing acute onset chest discomfort at 8:30 in the morning.  This occurred after being stung by a wasp was actually treated for concern for anaphylaxis due to facial swelling with IV steroids and Benadryl which improved the swelling and issues with his allergic reaction but started having chest discomfort.  Initial troponins were elevated and actually have been rising since.  He has had continued chest pain overnight into this morning although doing better still elevated and present.  He did have recent stress testing which was normal.        Review of Systems:  Review of Systems   Constitutional: Negative.   HENT: Negative.    Eyes: Negative.    Cardiovascular: Positive for chest pain.   Respiratory: Negative.    Endocrine: Negative.    Hematologic/Lymphatic: Negative.    Skin: Negative.    Musculoskeletal: Negative.    Gastrointestinal: Negative.    Genitourinary: Negative.    Neurological: Negative.    Psychiatric/Behavioral: Negative.    Allergic/Immunologic: Negative.        Medications:    Current Facility-Administered Medications:   •  acetaminophen (TYLENOL) tablet 650 mg, 650 mg, Oral, Q4H PRN **OR** acetaminophen  (TYLENOL) 160 MG/5ML solution 650 mg, 650 mg, Oral, Q4H PRN **OR** acetaminophen (TYLENOL) suppository 650 mg, 650 mg, Rectal, Q4H PRN, Lazara Rodas APRN  •  aluminum-magnesium hydroxide-simethicone (MAALOX MAX) 400-400-40 MG/5ML suspension 15 mL, 15 mL, Oral, Q6H PRN, Lazara Rodas APRN  •  aspirin tablet 325 mg, 325 mg, Oral, Daily, Lazara Rodas APRN  •  atorvastatin (LIPITOR) tablet 40 mg, 40 mg, Oral, Daily, Rossy Jacobs MD, 40 mg at 07/21/22 0120  •  clopidogrel (PLAVIX) tablet 75 mg, 75 mg, Oral, Daily, Rossy Jacobs MD  •  diphenhydrAMINE (BENADRYL) injection 25 mg, 25 mg, Intravenous, Q6H, Rossy Jacobs MD, 25 mg at 07/21/22 0517  •  famotidine (PEPCID) injection 20 mg, 20 mg, Intravenous, Q12H, Rossy Jacobs MD, 20 mg at 07/21/22 0105  •  heparin 63478 units/250 mL (100 units/mL) in 0.45 % NaCl infusion, 8.93 Units/kg/hr, Intravenous, Titrated, Lazara Rodas APRN, Last Rate: 13.36 mL/hr at 07/21/22 0437, 11.93 Units/kg/hr at 07/21/22 0437  •  HYDROcodone-acetaminophen (NORCO) 7.5-325 MG per tablet 1 tablet, 1 tablet, Oral, Q4H PRN, Lazara Rodas APRN, 1 tablet at 07/21/22 0522  •  lisinopril (PRINIVIL,ZESTRIL) tablet 20 mg, 20 mg, Oral, Daily, Rossy Jacobs MD  •  methylPREDNISolone sodium succinate (SOLU-Medrol) injection 60 mg, 60 mg, Intravenous, Q8H, Rossy Jacobs MD, 60 mg at 07/21/22 0105  •  metoprolol tartrate (LOPRESSOR) tablet 12.5 mg, 12.5 mg, Oral, Q12H, Rossy Jacobs MD, 12.5 mg at 07/21/22 0000  •  nitroglycerin (NITROSTAT) ointment 1 inch, 1 inch, Topical, Q6H, Lazara Rodas APRN, 1 inch at 07/20/22 2141  •  nitroglycerin (NITROSTAT) SL tablet 0.4 mg, 0.4 mg, Sublingual, Q5 Min PRN, Rossy Jacobs MD, 0.4 mg at 07/20/22 2056  •  ondansetron (ZOFRAN) tablet 4 mg, 4 mg, Oral, Q6H PRN **OR** ondansetron (ZOFRAN) injection 4 mg, 4 mg, Intravenous, Q6H PRN, Lazara Rodas, APRN  •  sodium chloride 0.9 % flush 10 mL,  10 mL, Intravenous, PRN, Lazara Rodas, APRN    Allergies   Allergen Reactions   • Wasp Venom Anaphylaxis       Past Medical History:   Diagnosis Date   • Chronic back pain     COMMON WEALTH PN MGMT [place], NARC RX   • GERD (gastroesophageal reflux disease)    • Hyperlipidemia    • Hypertension    • Hypertriglyceridemia    • Sleep apnea     On CPAP       Past Surgical History:   Procedure Laterality Date   • ANKLE SURGERY Right     ORIF   • HEMORRHOIDECTOMY N/A 11/3/2021    Procedure: HEMORRHOID BANDING;  Surgeon: Matthew Coronel MD;  [facility];  Service: General;  Laterality: N/A;       Social History     Socioeconomic History   • Marital status:    Tobacco Use   • Smoking status: Former Smoker     Years: 5.00     Quit date:      Years since quittin.5   • Smokeless tobacco: Current User   • Tobacco comment: Caffeine - 2 daily   Vaping Use   • Vaping Use: Never used   Substance and Sexual Activity   • Alcohol use: Yes     Alcohol/week: 12.0 standard drinks     Types: 12 Cans of beer per week   • Drug use: Yes     Frequency: 7.0 times per week     Types: Marijuana     Comment: daily/PAIN MGMT RX NARC CH BACK PAIN   • Sexual activity: Defer       Family History   Problem Relation Age of Onset   • Hypertension Mother    • Coronary artery disease Mother         Mother with stent   • Heart disease Father         Father with MI and 3 vessel CABG in upper 50's   • Hypertension Father    • Diabetes Maternal Uncle    • Cancer Maternal Uncle    • Cancer Paternal Grandfather    • Malig Hyperthermia Neg Hx        The following portions of the patient's history were reviewed and updated as appropriate: allergies, current medications, past family history, past medical history, past social history, past surgical history and problem list.         Objective:      Temp:  [98.2 °F (36.8 °C)-98.4 °F (36.9 °C)] 98.4 °F (36.9 °C)  Heart Rate:  [63-73] 73  Resp:  [17] 17  BP: (113-140)/(60-92)  113/60     Intake/Output Summary (Last 24 hours) at 7/21/2022 0727  Last data filed at 7/21/2022 0400  Gross per 24 hour   Intake --   Output 250 ml   Net -250 ml     Body mass index is 34.84 kg/m².      07/20/22 2149   Weight: 110 kg (242 lb 12.8 oz)           Physical Exam:  Constitutional: Well appearing, well developed, no acute distress   HENT: Oropharynx clear and membrane moist  Eyes: Normal conjunctiva, no sclera icterus.  Neck: Supple, no carotid bruit bilaterally.  Cardiovascular: Regular rate and rhythm, No Murmur, No bilateral lower extremity edema.  Pulmonary: Normal respiratory effort, normal lung sounds, no wheezing.  Abdominal: Soft, nontender, no hepatosplenomegaly, liver is non-pulsatile.  Neurological: Alert and orient x 3.   Skin: Warm, dry, no ecchymosis, no rash.  Psych: Appropriate mood and affect. Normal judgment and insight.         Lab Review:   Results from last 7 days   Lab Units 07/21/22 0258 07/20/22 2159   SODIUM mmol/L 139 140   POTASSIUM mmol/L 4.9 4.7   CHLORIDE mmol/L 101 103   CO2 mmol/L 23.5 20.5*   BUN mg/dL 18 15   CREATININE mg/dL 0.90 0.73*   GLUCOSE mg/dL 137* 112*   CALCIUM mg/dL 8.8 9.3   AST (SGOT) U/L 81* 69*   ALT (SGPT) U/L 47* 51*     Results from last 7 days   Lab Units 07/21/22  0545 07/21/22 0258 07/20/22 2159   TROPONIN T ng/mL 0.447* 0.301* 0.155*     Results from last 7 days   Lab Units 07/21/22  0258 07/20/22 2159   WBC 10*3/mm3 16.46* 14.02*   HEMOGLOBIN g/dL 15.4 16.2   HEMATOCRIT % 46.1 46.5   PLATELETS 10*3/mm3 291 282     Results from last 7 days   Lab Units 07/21/22  0545 07/21/22  0258 07/20/22 2159   INR   --   --  1.17*   APTT seconds 30.8 25.4 25.2         Results from last 7 days   Lab Units 07/21/22 0258   CHOLESTEROL mg/dL 119   TRIGLYCERIDES mg/dL 241*   HDL CHOL mg/dL 26*     The ASCVD Risk score (Soy AMOS Jr., et al., 2013) failed to calculate for the following reasons:    The valid total cholesterol range is 130 to 320 mg/dL              EKG on 7/21/22-    Baseline EKG on 7/20/22-    No acute ST changes noted on current ECG some subtle changes on prior baseline ECG in the inferior leads.    Previous Testing:    Echocardiogram on 7/1/22:  · Calculated left ventricular EF = 63% Calculated left ventricular 3D EF = 56% Estimated left ventricular EF was in agreement with the calculated left ventricular EF. Left ventricular systolic function is normal.  · Left ventricular diastolic function was normal.  · Sclerotic trileaflet aortic valve with mild calcification of the noncoronary cusp noted without any significant stenosis or regurgitation.  · Normal right ventricular size and function.    Stress Test on 5/30/17:  · Diaphragmatic attenuation and GI artifacts are present.  · Myocardial perfusion imaging indicates a normal myocardial perfusion study with no evidence of ischemia.  · Left ventricular ejection fraction is normal (Calculated EF = 67%).  · Impressions are consistent with a low risk study.             Assessment:           Chest pain    Essential hypertension    HLD (hyperlipidemia)    Elevated troponin    EVAN (obstructive sleep apnea)    Obesity (BMI 30-39.9)    Wasp sting-induced anaphylaxis         Plan:       Mr. Harley is a 47 y.o. gentleman who follows with Dr. Velasquez with a past medical history notable for tobacco abuse, obesity, gastric reflux disease, obstructive sleep apnea, hypertension, and mixed hyperlipidemia presents to the hospital with chest pain and was found to have elevated troponin and ongoing chest pain concerning for high risk non-ST ovation myocardial infarction.  Given his ongoing chest discomfort that is obviously concerning for active and continued coronary issues.  He is currently on a heparin infusion and Nitropaste.  We will proceed forward with cardiac catheterization urgently this morning to define his coronary anatomy.    Non-ST elevation myocardial infarction:  · Continue heparin and Nitropaste  · Will proceed  forward urgently with cardiac catheterization this morning    Hypertension:  · Denies current medical therapy make adjustments with home regimen after cardiac catheterization        Thank you for allowing me to participate in the care of Heber Batistaris. Feel free to contact me directly with any further questions or concerns.    Jonny Melton MD  [facility]  07/21/22  07:27 EDT

## 2022-07-21 NOTE — PLAN OF CARE
Goal Outcome Evaluation:  Plan of Care Reviewed With: patient        Progress: no change  Outcome Evaluation: Patient is A&O X4. All VS stable. Chest pain treated per MAR. Cont heparin gtt and adjusted per order. Npo after MN. Will continue to monitor and report changes to MD.

## 2022-07-22 ENCOUNTER — APPOINTMENT (OUTPATIENT)
Dept: CARDIOLOGY | Facility: HOSPITAL | Age: 47
End: 2022-07-22

## 2022-07-22 VITALS
HEIGHT: 70 IN | DIASTOLIC BLOOD PRESSURE: 92 MMHG | SYSTOLIC BLOOD PRESSURE: 143 MMHG | OXYGEN SATURATION: 95 % | WEIGHT: 242.51 LBS | HEART RATE: 73 BPM | RESPIRATION RATE: 18 BRPM | TEMPERATURE: 98 F | BODY MASS INDEX: 34.72 KG/M2

## 2022-07-22 PROBLEM — I21.4 NSTEMI (NON-ST ELEVATED MYOCARDIAL INFARCTION) (HCC): Status: ACTIVE | Noted: 2022-07-22

## 2022-07-22 LAB
ACT BLD: 103 SECONDS (ref 82–152)
ACT BLD: 242 SECONDS (ref 82–152)
ACT BLD: 387 SECONDS (ref 82–152)
ALBUMIN SERPL-MCNC: 4.5 G/DL (ref 3.5–5.2)
ALBUMIN/GLOB SERPL: 2.1 G/DL
ALP SERPL-CCNC: 57 U/L (ref 39–117)
ALT SERPL W P-5'-P-CCNC: 50 U/L (ref 1–41)
ANION GAP SERPL CALCULATED.3IONS-SCNC: 15.3 MMOL/L (ref 5–15)
AORTIC DIMENSIONLESS INDEX: 0.8 (DI)
APTT PPP: 24 SECONDS (ref 22.7–35.4)
AST SERPL-CCNC: 74 U/L (ref 1–40)
BASOPHILS # BLD AUTO: 0.02 10*3/MM3 (ref 0–0.2)
BASOPHILS NFR BLD AUTO: 0.1 % (ref 0–1.5)
BH CV ECHO MEAS - AO MAX PG: 13.3 MMHG
BH CV ECHO MEAS - AO MEAN PG: 6.2 MMHG
BH CV ECHO MEAS - AO V2 MAX: 182.3 CM/SEC
BH CV ECHO MEAS - AO V2 VTI: 36.9 CM
BH CV ECHO MEAS - AVA(I,D): 3.3 CM2
BH CV ECHO MEAS - EDV(CUBED): 170 ML
BH CV ECHO MEAS - EDV(MOD-SP2): 93 ML
BH CV ECHO MEAS - EDV(MOD-SP4): 112 ML
BH CV ECHO MEAS - EF(MOD-BP): 53.8 %
BH CV ECHO MEAS - EF(MOD-SP2): 44.1 %
BH CV ECHO MEAS - EF(MOD-SP4): 60.7 %
BH CV ECHO MEAS - ESV(CUBED): 61.8 ML
BH CV ECHO MEAS - ESV(MOD-SP2): 52 ML
BH CV ECHO MEAS - ESV(MOD-SP4): 44 ML
BH CV ECHO MEAS - FS: 28.6 %
BH CV ECHO MEAS - IVS/LVPW: 0.96 CM
BH CV ECHO MEAS - IVSD: 0.91 CM
BH CV ECHO MEAS - LAT PEAK E' VEL: 17 CM/SEC
BH CV ECHO MEAS - LV DIASTOLIC VOL/BSA (35-75): 57.2 CM2
BH CV ECHO MEAS - LV MASS(C)D: 196.1 GRAMS
BH CV ECHO MEAS - LV MAX PG: 6.6 MMHG
BH CV ECHO MEAS - LV MEAN PG: 3.2 MMHG
BH CV ECHO MEAS - LV SYSTOLIC VOL/BSA (12-30): 22.5 CM2
BH CV ECHO MEAS - LV V1 MAX: 128 CM/SEC
BH CV ECHO MEAS - LV V1 VTI: 27.8 CM
BH CV ECHO MEAS - LVIDD: 5.5 CM
BH CV ECHO MEAS - LVIDS: 4 CM
BH CV ECHO MEAS - LVOT AREA: 4.4 CM2
BH CV ECHO MEAS - LVOT DIAM: 2.35 CM
BH CV ECHO MEAS - LVPWD: 0.95 CM
BH CV ECHO MEAS - MED PEAK E' VEL: 8.5 CM/SEC
BH CV ECHO MEAS - MV A MAX VEL: 59.2 CM/SEC
BH CV ECHO MEAS - MV DEC SLOPE: 281.1 CM/SEC2
BH CV ECHO MEAS - MV DEC TIME: 0.21 MSEC
BH CV ECHO MEAS - MV E MAX VEL: 81.5 CM/SEC
BH CV ECHO MEAS - MV E/A: 1.38
BH CV ECHO MEAS - MV MAX PG: 3.1 MMHG
BH CV ECHO MEAS - MV MEAN PG: 1.09 MMHG
BH CV ECHO MEAS - MV P1/2T: 95 MSEC
BH CV ECHO MEAS - MV V2 VTI: 28.8 CM
BH CV ECHO MEAS - MVA(P1/2T): 2.32 CM2
BH CV ECHO MEAS - MVA(VTI): 4.2 CM2
BH CV ECHO MEAS - PA ACC TIME: 0.12 SEC
BH CV ECHO MEAS - PA PR(ACCEL): 26.7 MMHG
BH CV ECHO MEAS - PA V2 MAX: 133.8 CM/SEC
BH CV ECHO MEAS - PULM A REVS DUR: 0.12 SEC
BH CV ECHO MEAS - PULM A REVS VEL: 21.3 CM/SEC
BH CV ECHO MEAS - PULM DIAS VEL: 68.3 CM/SEC
BH CV ECHO MEAS - PULM S/D: 0.79
BH CV ECHO MEAS - PULM SYS VEL: 54.1 CM/SEC
BH CV ECHO MEAS - QP/QS: 0.55
BH CV ECHO MEAS - RV MAX PG: 3.1 MMHG
BH CV ECHO MEAS - RV V1 MAX: 88.3 CM/SEC
BH CV ECHO MEAS - RV V1 VTI: 19.9 CM
BH CV ECHO MEAS - RVOT DIAM: 2.06 CM
BH CV ECHO MEAS - SI(MOD-SP2): 20.9 ML/M2
BH CV ECHO MEAS - SI(MOD-SP4): 34.7 ML/M2
BH CV ECHO MEAS - SV(LVOT): 121.1 ML
BH CV ECHO MEAS - SV(MOD-SP2): 41 ML
BH CV ECHO MEAS - SV(MOD-SP4): 68 ML
BH CV ECHO MEAS - SV(RVOT): 66.6 ML
BH CV ECHO MEASUREMENTS AVERAGE E/E' RATIO: 6.39
BH CV XLRA - RV BASE: 3.4 CM
BH CV XLRA - RV LENGTH: 6.5 CM
BH CV XLRA - TDI S': 12.2 CM/SEC
BILIRUB SERPL-MCNC: 0.6 MG/DL (ref 0–1.2)
BUN SERPL-MCNC: 25 MG/DL (ref 6–20)
BUN/CREAT SERPL: 26 (ref 7–25)
CALCIUM SPEC-SCNC: 9 MG/DL (ref 8.6–10.5)
CHLORIDE SERPL-SCNC: 101 MMOL/L (ref 98–107)
CHOLEST SERPL-MCNC: 130 MG/DL (ref 0–200)
CO2 SERPL-SCNC: 20.7 MMOL/L (ref 22–29)
CREAT SERPL-MCNC: 0.96 MG/DL (ref 0.76–1.27)
DEPRECATED RDW RBC AUTO: 40.4 FL (ref 37–54)
EGFRCR SERPLBLD CKD-EPI 2021: 98.1 ML/MIN/1.73
EOSINOPHIL # BLD AUTO: 0 10*3/MM3 (ref 0–0.4)
EOSINOPHIL NFR BLD AUTO: 0 % (ref 0.3–6.2)
ERYTHROCYTE [DISTWIDTH] IN BLOOD BY AUTOMATED COUNT: 12.5 % (ref 12.3–15.4)
GLOBULIN UR ELPH-MCNC: 2.1 GM/DL
GLUCOSE SERPL-MCNC: 143 MG/DL (ref 65–99)
HCT VFR BLD AUTO: 45.2 % (ref 37.5–51)
HDLC SERPL-MCNC: 23 MG/DL (ref 40–60)
HGB BLD-MCNC: 15.8 G/DL (ref 13–17.7)
IMM GRANULOCYTES # BLD AUTO: 0.22 10*3/MM3 (ref 0–0.05)
IMM GRANULOCYTES NFR BLD AUTO: 1.1 % (ref 0–0.5)
LDLC SERPL CALC-MCNC: 63 MG/DL (ref 0–100)
LDLC/HDLC SERPL: 2.25 {RATIO}
LEFT ATRIUM VOLUME INDEX: 31 ML/M2
LYMPHOCYTES # BLD AUTO: 1.64 10*3/MM3 (ref 0.7–3.1)
LYMPHOCYTES NFR BLD AUTO: 8 % (ref 19.6–45.3)
MAGNESIUM SERPL-MCNC: 2.4 MG/DL (ref 1.6–2.6)
MAXIMAL PREDICTED HEART RATE: 173 BPM
MCH RBC QN AUTO: 31 PG (ref 26.6–33)
MCHC RBC AUTO-ENTMCNC: 35 G/DL (ref 31.5–35.7)
MCV RBC AUTO: 88.8 FL (ref 79–97)
MONOCYTES # BLD AUTO: 0.61 10*3/MM3 (ref 0.1–0.9)
MONOCYTES NFR BLD AUTO: 3 % (ref 5–12)
NEUTROPHILS NFR BLD AUTO: 18.04 10*3/MM3 (ref 1.7–7)
NEUTROPHILS NFR BLD AUTO: 87.8 % (ref 42.7–76)
NRBC BLD AUTO-RTO: 0 /100 WBC (ref 0–0.2)
PLATELET # BLD AUTO: 306 10*3/MM3 (ref 140–450)
PMV BLD AUTO: 11.5 FL (ref 6–12)
POTASSIUM SERPL-SCNC: 4.5 MMOL/L (ref 3.5–5.2)
PROT SERPL-MCNC: 6.6 G/DL (ref 6–8.5)
QT INTERVAL: 431 MS
RBC # BLD AUTO: 5.09 10*6/MM3 (ref 4.14–5.8)
SINUS: 3.6 CM
SODIUM SERPL-SCNC: 137 MMOL/L (ref 136–145)
STRESS TARGET HR: 147 BPM
TRIGL SERPL-MCNC: 276 MG/DL (ref 0–150)
TROPONIN T SERPL-MCNC: 0.83 NG/ML (ref 0–0.03)
VLDLC SERPL-MCNC: 44 MG/DL (ref 5–40)
WBC NRBC COR # BLD: 20.53 10*3/MM3 (ref 3.4–10.8)

## 2022-07-22 PROCEDURE — 80053 COMPREHEN METABOLIC PANEL: CPT | Performed by: INTERNAL MEDICINE

## 2022-07-22 PROCEDURE — 25010000002 DIPHENHYDRAMINE PER 50 MG: Performed by: INTERNAL MEDICINE

## 2022-07-22 PROCEDURE — 25010000002 PERFLUTREN (DEFINITY) 8.476 MG IN SODIUM CHLORIDE (PF) 0.9 % 10 ML INJECTION: Performed by: INTERNAL MEDICINE

## 2022-07-22 PROCEDURE — 80061 LIPID PANEL: CPT | Performed by: INTERNAL MEDICINE

## 2022-07-22 PROCEDURE — 25010000002 METHYLPREDNISOLONE PER 125 MG: Performed by: INTERNAL MEDICINE

## 2022-07-22 PROCEDURE — 93005 ELECTROCARDIOGRAM TRACING: CPT | Performed by: INTERNAL MEDICINE

## 2022-07-22 PROCEDURE — 84484 ASSAY OF TROPONIN QUANT: CPT | Performed by: HOSPITALIST

## 2022-07-22 PROCEDURE — 93010 ELECTROCARDIOGRAM REPORT: CPT | Performed by: INTERNAL MEDICINE

## 2022-07-22 PROCEDURE — 83735 ASSAY OF MAGNESIUM: CPT | Performed by: INTERNAL MEDICINE

## 2022-07-22 PROCEDURE — 93306 TTE W/DOPPLER COMPLETE: CPT | Performed by: INTERNAL MEDICINE

## 2022-07-22 PROCEDURE — 93306 TTE W/DOPPLER COMPLETE: CPT

## 2022-07-22 PROCEDURE — 85025 COMPLETE CBC W/AUTO DIFF WBC: CPT | Performed by: INTERNAL MEDICINE

## 2022-07-22 PROCEDURE — 99232 SBSQ HOSP IP/OBS MODERATE 35: CPT | Performed by: INTERNAL MEDICINE

## 2022-07-22 PROCEDURE — 85730 THROMBOPLASTIN TIME PARTIAL: CPT | Performed by: INTERNAL MEDICINE

## 2022-07-22 RX ORDER — METHYLPREDNISOLONE 4 MG/1
TABLET ORAL
Qty: 21 TABLET
Start: 2022-07-22 | End: 2022-08-03

## 2022-07-22 RX ORDER — CETIRIZINE HYDROCHLORIDE 10 MG/1
10 TABLET ORAL 2 TIMES DAILY
Qty: 14 TABLET | Refills: 0 | Status: SHIPPED | OUTPATIENT
Start: 2022-07-22 | End: 2022-08-03

## 2022-07-22 RX ORDER — METHYLPREDNISOLONE 4 MG/1
TABLET ORAL
Qty: 21 TABLET
Start: 2022-07-26 | End: 2022-08-03

## 2022-07-22 RX ORDER — PANTOPRAZOLE SODIUM 40 MG/1
40 TABLET, DELAYED RELEASE ORAL DAILY
Qty: 30 TABLET | Refills: 0 | Status: SHIPPED | OUTPATIENT
Start: 2022-07-22 | End: 2022-08-15 | Stop reason: SDUPTHER

## 2022-07-22 RX ORDER — METHYLPREDNISOLONE 4 MG/1
4 TABLET ORAL
Status: DISCONTINUED | OUTPATIENT
Start: 2022-07-26 | End: 2022-07-22 | Stop reason: HOSPADM

## 2022-07-22 RX ORDER — FAMOTIDINE 20 MG/1
20 TABLET, FILM COATED ORAL
Qty: 14 TABLET | Refills: 0 | Status: SHIPPED | OUTPATIENT
Start: 2022-07-22 | End: 2022-09-19 | Stop reason: ALTCHOICE

## 2022-07-22 RX ORDER — METHYLPREDNISOLONE 4 MG/1
4 TABLET ORAL
Status: DISCONTINUED | OUTPATIENT
Start: 2022-07-23 | End: 2022-07-22 | Stop reason: HOSPADM

## 2022-07-22 RX ORDER — LISINOPRIL 20 MG/1
20 TABLET ORAL DAILY
Qty: 30 TABLET | Refills: 0 | Status: SHIPPED | OUTPATIENT
Start: 2022-07-23 | End: 2023-03-20 | Stop reason: DRUGHIGH

## 2022-07-22 RX ORDER — METHYLPREDNISOLONE 4 MG/1
8 TABLET ORAL
Status: DISCONTINUED | OUTPATIENT
Start: 2022-07-23 | End: 2022-07-22 | Stop reason: HOSPADM

## 2022-07-22 RX ORDER — METHYLPREDNISOLONE 4 MG/1
4 TABLET ORAL
Status: DISCONTINUED | OUTPATIENT
Start: 2022-07-25 | End: 2022-07-22 | Stop reason: HOSPADM

## 2022-07-22 RX ORDER — METHYLPREDNISOLONE 4 MG/1
TABLET ORAL
Qty: 21 TABLET
Start: 2022-07-23 | End: 2022-08-03

## 2022-07-22 RX ORDER — METHYLPREDNISOLONE 4 MG/1
4 TABLET ORAL
Status: DISCONTINUED | OUTPATIENT
Start: 2022-07-22 | End: 2022-07-22 | Stop reason: HOSPADM

## 2022-07-22 RX ORDER — ASPIRIN 81 MG/1
81 TABLET ORAL DAILY
Qty: 30 TABLET | Refills: 0 | Status: SHIPPED | OUTPATIENT
Start: 2022-07-23

## 2022-07-22 RX ORDER — METHYLPREDNISOLONE 4 MG/1
4 TABLET ORAL
Status: DISCONTINUED | OUTPATIENT
Start: 2022-07-24 | End: 2022-07-22 | Stop reason: HOSPADM

## 2022-07-22 RX ORDER — FAMOTIDINE 20 MG/1
20 TABLET, FILM COATED ORAL
Status: DISCONTINUED | OUTPATIENT
Start: 2022-07-22 | End: 2022-07-22 | Stop reason: HOSPADM

## 2022-07-22 RX ORDER — METHYLPREDNISOLONE 4 MG/1
4 TABLET ORAL
Status: DISCONTINUED | OUTPATIENT
Start: 2022-07-27 | End: 2022-07-22 | Stop reason: HOSPADM

## 2022-07-22 RX ORDER — ATORVASTATIN CALCIUM 40 MG/1
40 TABLET, FILM COATED ORAL DAILY
Qty: 90 TABLET | Refills: 0 | Status: SHIPPED | OUTPATIENT
Start: 2022-07-23 | End: 2022-08-15 | Stop reason: SDUPTHER

## 2022-07-22 RX ORDER — METHYLPREDNISOLONE 4 MG/1
TABLET ORAL
Qty: 21 TABLET
Start: 2022-07-25 | End: 2022-08-03

## 2022-07-22 RX ORDER — METHYLPREDNISOLONE 4 MG/1
8 TABLET ORAL
Status: DISCONTINUED | OUTPATIENT
Start: 2022-07-22 | End: 2022-07-22 | Stop reason: HOSPADM

## 2022-07-22 RX ORDER — NITROGLYCERIN 0.4 MG/1
0.4 TABLET SUBLINGUAL
Qty: 25 TABLET | Refills: 0 | Status: SHIPPED | OUTPATIENT
Start: 2022-07-22

## 2022-07-22 RX ORDER — METHYLPREDNISOLONE 4 MG/1
TABLET ORAL
Qty: 21 TABLET
Start: 2022-07-24 | End: 2022-08-03

## 2022-07-22 RX ORDER — CLOPIDOGREL BISULFATE 75 MG/1
75 TABLET ORAL DAILY
Qty: 30 TABLET | Refills: 0 | Status: SHIPPED | OUTPATIENT
Start: 2022-07-23 | End: 2022-08-15 | Stop reason: SDUPTHER

## 2022-07-22 RX ORDER — METHYLPREDNISOLONE 4 MG/1
TABLET ORAL
Qty: 21 TABLET
Start: 2022-07-27 | End: 2022-08-03

## 2022-07-22 RX ORDER — EPINEPHRINE 0.3 MG/.3ML
0.3 INJECTION SUBCUTANEOUS ONCE
Qty: 2 EACH | Refills: 0 | Status: SHIPPED | OUTPATIENT
Start: 2022-07-22 | End: 2022-07-23

## 2022-07-22 RX ORDER — CETIRIZINE HYDROCHLORIDE 10 MG/1
10 TABLET ORAL 2 TIMES DAILY
Status: DISCONTINUED | OUTPATIENT
Start: 2022-07-22 | End: 2022-07-22 | Stop reason: HOSPADM

## 2022-07-22 RX ADMIN — HYDROCODONE BITARTRATE AND ACETAMINOPHEN 1 TABLET: 5; 325 TABLET ORAL at 15:39

## 2022-07-22 RX ADMIN — ASPIRIN 81 MG: 81 TABLET, COATED ORAL at 10:53

## 2022-07-22 RX ADMIN — ATORVASTATIN CALCIUM 40 MG: 20 TABLET, FILM COATED ORAL at 10:51

## 2022-07-22 RX ADMIN — HYDROCODONE BITARTRATE AND ACETAMINOPHEN 1 TABLET: 5; 325 TABLET ORAL at 06:31

## 2022-07-22 RX ADMIN — HYDROCODONE BITARTRATE AND ACETAMINOPHEN 1 TABLET: 5; 325 TABLET ORAL at 10:53

## 2022-07-22 RX ADMIN — LISINOPRIL 20 MG: 20 TABLET ORAL at 10:51

## 2022-07-22 RX ADMIN — FAMOTIDINE 20 MG: 20 TABLET ORAL at 10:54

## 2022-07-22 RX ADMIN — CLOPIDOGREL 75 MG: 75 TABLET, FILM COATED ORAL at 10:53

## 2022-07-22 RX ADMIN — DIPHENHYDRAMINE HYDROCHLORIDE 25 MG: 50 INJECTION, SOLUTION INTRAMUSCULAR; INTRAVENOUS at 06:26

## 2022-07-22 RX ADMIN — METOPROLOL TARTRATE 12.5 MG: 25 TABLET, FILM COATED ORAL at 10:54

## 2022-07-22 RX ADMIN — PERFLUTREN 2 ML: 6.52 INJECTION, SUSPENSION INTRAVENOUS at 09:37

## 2022-07-22 RX ADMIN — METHYLPREDNISOLONE SODIUM SUCCINATE 60 MG: 125 INJECTION, POWDER, FOR SOLUTION INTRAMUSCULAR; INTRAVENOUS at 10:54

## 2022-07-22 NOTE — DISCHARGE SUMMARY
Patient Name: Heber Harley  : 1975  MRN: [id number]    Date of Admission: 2022  Date of Discharge:  2022  Primary Care Physician: Deya Pack APRN      Chief Complaint:   No chief complaint on file.      Discharge Diagnoses     Active Hospital Problems    Diagnosis  POA   • **Chest pain [R07.9]  Yes   • NSTEMI (non-ST elevated myocardial infarction) (HCC) [I21.4]  Yes   • Wasp sting-induced anaphylaxis [T63.461A]  Yes   • Leukocytosis [D72.829]  Yes   • Hyperglycemia, drug-induced [R73.9, T50.905A]  Yes   • HLD (hyperlipidemia) [E78.5]  Yes   • Elevated troponin [R77.8]  Yes   • EVAN (obstructive sleep apnea) [G47.33]  Yes   • Obesity (BMI 30-39.9) [E66.9]  Yes   • Essential hypertension [I10]  Yes      Resolved Hospital Problems   No resolved problems to display.        Hospital Course     Very pleasant 47 y.o. gentleman admitted with chest pain and elevated Trop in setting of treatment for wasp sting/anaphylaxis. Please see below for details of admission:     Left pinna wasp sting leading to anaphylaxis.    Patient is s/p Epi injection/IV Pepcid/IV SoluMedrol/IV Benadryl at outside ER with good results. Have changed IV SoluMedrol to Medrol dose pack in anticipation of dc. Changed Benadryl to BID Zyrtec for the next week. Continue PO Pepcid for the next week as well. Have rx'd Epi-Pen.     Chest pain/elevated troponin (demand ischemia vs non-ST elevation MI?).  Left heart cath  with SID to OM1. Continue Plavix/ASA/lisinopril/metoprolol/Lipitor. Cardiology consult appreciated. Trop trending up 0.155>>0.447>>>0.832. EKGs pretty benign. Echo this AM pending. No longer symptomatic. Home today per Card--their office will call him to set up f/u appt.  Have changed his PPI from omeprazole to Protonix now that he is on Plavix.     Leukocytosis.  Suspect due to stress reaction as well as steroids. No s/sx of infection. Continue to monitor as outpt through PCP office.     Hyperlipidemia.   Started Lipitor here. Reviewed lipid panel--trigs high and HDL low. Has only ever been on Simvastatin in past--did not tolerate due to muscle side effects. If doesn't tolerate Lipitor then consider changing to hydrophilic statin (Crestor).     Hypertension.   Good control on lisinopril. Continue new low-dose beta-blocker given his CAD.      Hyperglycemia.  A1c only 5.7. Suspect due to steroids. F/u with PCP.     Elevated liver function test.    Benign GI examination. Suspect due to fatty liver. Will need to continue to monitor as outpt through PCP office or Card office.         · SCDs for DVT prophylaxis while here.  · Full code confirmed.  · Discussed with patient, wife, and RN.  · Discharge home with family today.       Day of Discharge     Subjective:  No longer c/o chest pressure, SOA, and nausea. No palp, diaphoresis, vomiting.   Left ear feeling better and swelling in face much improved. No stridor, speech changes, dysphagia, hearing changes.    Physical Exam:  Temp:  [97.6 °F (36.4 °C)-98.4 °F (36.9 °C)] 98 °F (36.7 °C)  Heart Rate:  [60-76] 73  Resp:  [18] 18  BP: (119-143)/(82-98) 143/92  Body mass index is 35.11 kg/m².  Physical Exam  Vitals and nursing note reviewed. Exam conducted with a chaperone present (Wife).   Constitutional:       General: He is not in acute distress.     Appearance: He is obese. He is not ill-appearing, toxic-appearing or diaphoretic.   HENT:      Head: Normocephalic and atraumatic.      Right Ear: External ear normal.      Left Ear: External ear normal.      Ears:      Comments: Left ear looks fine to me today     Nose: Nose normal.      Mouth/Throat:      Mouth: Mucous membranes are moist.      Pharynx: Oropharynx is clear.   Cardiovascular:      Rate and Rhythm: Normal rate and regular rhythm.      Pulses: Normal pulses.      Heart sounds: Normal heart sounds.   Pulmonary:      Effort: Pulmonary effort is normal. No respiratory distress.      Breath sounds: Normal breath  sounds. No stridor. No wheezing or rales.   Chest:      Chest wall: No tenderness.   Abdominal:      General: Bowel sounds are normal. There is no distension.      Palpations: Abdomen is soft.      Tenderness: There is no abdominal tenderness.   Musculoskeletal:         General: No swelling or deformity. Normal range of motion.    Skin:     General: Skin is warm and dry.   Neurological:      General: No focal deficit present.           Consultants     Consult Orders (all) (From admission, onward)     Start     Ordered    07/20/22 2110  Inpatient Cardiology Consult  Once        Specialty:  Cardiology  Provider:  Kevin Velasquez MD    07/20/22 2109 07/20/22 2105  Inpatient Cardiology Consult  Once,   Status:  Canceled        Specialty:  Cardiology  Provider:  Kevin Velasquez MD    07/20/22 2104              Procedures     Coronary Angiography, Left Heart Cath, Stent SID coronary, Left ventriculography      Imaging Results (All)     None          Results for orders placed during the hospital encounter of 07/20/22    Adult Transthoracic Echo Complete W/ Cont if Necessary Per Protocol    Interpretation Summary  · The following left ventricular wall segments are hypokinetic: basal anterolateral and mid anterolateral. New compared wtih prior echo  · Left ventricular ejection fraction appears to be 51 - 55%.  · Left ventricular diastolic function was normal.    Pertinent Labs     Results from last 7 days   Lab Units 07/22/22  0300 07/21/22  0258 07/20/22 2159   WBC 10*3/mm3 20.53* 16.46* 14.02*   HEMOGLOBIN g/dL 15.8 15.4 16.2   PLATELETS 10*3/mm3 306 291 282     Results from last 7 days   Lab Units 07/22/22  0300 07/21/22  0258 07/20/22  2159   SODIUM mmol/L 137 139 140   POTASSIUM mmol/L 4.5 4.9 4.7   CHLORIDE mmol/L 101 101 103   CO2 mmol/L 20.7* 23.5 20.5*   BUN mg/dL 25* 18 15   CREATININE mg/dL 0.96 0.90 0.73*   GLUCOSE mg/dL 143* 137* 112*   EGFR mL/min/1.73 98.1 106.0 112.9     Results from last 7  days   Lab Units 07/22/22  0300 07/21/22  0258 07/20/22  2159   ALBUMIN g/dL 4.50 4.30 4.70   BILIRUBIN mg/dL 0.6 0.7 0.7   ALK PHOS U/L 57 58 61   AST (SGOT) U/L 74* 81* 69*   ALT (SGPT) U/L 50* 47* 51*     Results from last 7 days   Lab Units 07/22/22  0300 07/21/22  0258 07/20/22  2159   CALCIUM mg/dL 9.0 8.8 9.3   ALBUMIN g/dL 4.50 4.30 4.70   MAGNESIUM mg/dL 2.4  --   --        Results from last 7 days   Lab Units 07/22/22  0300 07/21/22  0545 07/21/22 0258 07/20/22  2159   TROPONIN T ng/mL 0.832* 0.447* 0.301* 0.155*       Results from last 7 days   Lab Units 07/22/22  0300   CHOLESTEROL mg/dL 130   TRIGLYCERIDES mg/dL 276*   HDL CHOL mg/dL 23*   LDL CHOL mg/dL 63         Results from last 7 days   Lab Units 07/20/22  2209   COVID19  Not Detected       Test Results Pending at Discharge       Discharge Details        Discharge Medications      New Medications      Instructions Start Date   aspirin 81 MG EC tablet  Replaces: aspirin 325 MG tablet   81 mg, Oral, Daily   Start Date: July 23, 2022     atorvastatin 40 MG tablet  Commonly known as: LIPITOR   40 mg, Oral, Daily   Start Date: July 23, 2022     cetirizine 10 MG tablet  Commonly known as: zyrTEC   10 mg, Oral, 2 Times Daily      clopidogrel 75 MG tablet  Commonly known as: PLAVIX   75 mg, Oral, Daily   Start Date: July 23, 2022     EPINEPHrine 0.3 MG/0.3ML solution auto-injector injection  Commonly known as: EpiPen 2-Neo   0.3 mg, Intramuscular, Once      famotidine 20 MG tablet  Commonly known as: PEPCID   20 mg, Oral, 2 Times Daily Before Meals      methylPREDNISolone 4 MG dose pack  Commonly known as: MEDROL   Take as directed on package instructions.      methylPREDNISolone 4 MG dose pack  Commonly known as: MEDROL   Take as directed on package instructions.      methylPREDNISolone 4 MG dose pack  Commonly known as: MEDROL   Take as directed on package instructions.      methylPREDNISolone 4 MG dose pack  Commonly known as: MEDROL   Take as directed  on package instructions.      methylPREDNISolone 4 MG dose pack  Commonly known as: MEDROL   Take as directed on package instructions.   Start Date: July 23, 2022     methylPREDNISolone 4 MG dose pack  Commonly known as: MEDROL   Take as directed on package instructions.   Start Date: July 23, 2022     methylPREDNISolone 4 MG dose pack  Commonly known as: MEDROL   Take as directed on package instructions.   Start Date: July 24, 2022     methylPREDNISolone 4 MG dose pack  Commonly known as: MEDROL   Take as directed on package instructions.   Start Date: July 25, 2022     methylPREDNISolone 4 MG dose pack  Commonly known as: MEDROL   Take as directed on package instructions.   Start Date: July 26, 2022     methylPREDNISolone 4 MG dose pack  Commonly known as: MEDROL   Take as directed on package instructions.   Start Date: July 26, 2022     methylPREDNISolone 4 MG dose pack  Commonly known as: MEDROL   Take as directed on package instructions.   Start Date: July 27, 2022     metoprolol tartrate 25 MG tablet  Commonly known as: LOPRESSOR   12.5 mg, Oral, Every 12 Hours Scheduled      pantoprazole 40 MG EC tablet  Commonly known as: Protonix   40 mg, Oral, Daily         Changes to Medications      Instructions Start Date   HYDROcodone-acetaminophen 7.5-325 MG per tablet  Commonly known as: NORCO  What changed: Another medication with the same name was removed. Continue taking this medication, and follow the directions you see here.   TAKE 1 TABLET 3 TIMES DAILY AS NEEDED FOR PAIN.*MAY TAKE EXTRA IF NEEDED*      lisinopril 20 MG tablet  Commonly known as: PRINIVIL,ZESTRIL  What changed:   · medication strength  · how much to take   20 mg, Oral, Daily   Start Date: July 23, 2022     nitroglycerin 0.4 MG SL tablet  Commonly known as: NITROSTAT  What changed: reasons to take this   0.4 mg, Sublingual, Every 5 Minutes PRN, Take no more than 3 doses in 15 minutes.         Continue These Medications      Instructions Start  Date   Caltrate 600+D3 600-800 MG-UNIT tablet  Generic drug: calcium carb-cholecalciferol   take 1 tablet by mouth twice daily      fish oil 1000 MG capsule capsule   2,000 mg, Oral, 2 Times Daily With Meals, INST PER ANESTHESIA PROTOCOL      sildenafil 20 MG tablet  Commonly known as: REVATIO   Take 1-2 tablets by mouth 1 hour prior to intercourse      terbinafine 250 MG tablet  Commonly known as: lamiSIL   250 mg, Oral, Daily      Testosterone 30 MG/ACT solution   1 PUMP ON EACH AXILLA DAILY       Trintellix 10 MG tablet  Generic drug: Vortioxetine HBr   TAKE 1 TABLET BY MOUTH EVERY DAY      vitamin D 1.25 MG (08508 UT) capsule capsule  Commonly known as: ERGOCALCIFEROL   50,000 Units, Oral, Weekly         Stop These Medications    aspirin 325 MG tablet  Replaced by: aspirin 81 MG EC tablet     diclofenac 75 MG EC tablet  Commonly known as: VOLTAREN     doxycycline 100 MG capsule  Commonly known as: VIBRAMYCIN     Nexlizet 180-10 MG tablet  Generic drug: Bempedoic Acid-Ezetimibe     omeprazole 20 MG capsule  Commonly known as: priLOSEC     predniSONE 10 MG tablet  Commonly known as: DELTASONE     Testosterone Cypionate 200 MG/ML injection  Commonly known as: DEPOTESTOTERONE CYPIONATE            Allergies   Allergen Reactions   • Wasp Venom Anaphylaxis       Discharge Disposition:  Home or Self Care      Discharge Diet:  Diet Order   Procedures   • Diet Regular; Cardiac       Discharge Activity:   as tolerated    CODE STATUS:    Code Status and Medical Interventions:   Ordered at: 07/20/22 2106     Code Status (Patient has no pulse and is not breathing):    CPR (Attempt to Resuscitate)     Medical Interventions (Patient has pulse or is breathing):    Full Support       Future Appointments   Date Time Provider Department Center   7/3/2023  3:40 PM Kevin Velasquez MD [facility]     Additional Instructions for the Follow-ups that You Need to Schedule     Ambulatory Referral to Cardiac Rehab   As  directed      Discharge Follow-up with PCP   As directed       Currently Documented PCP:    Deya Pack APRN    PCP Phone Number:    [phone]     Follow Up Details: Ramone NP (PCP) in 1 week         Discharge Follow-up with Specified Provider: [facility]; 2 Weeks   As directed      To: [facility]    Follow Up: 2 Weeks            Follow-up Information     [facility] .    Specialty: Cardiac Rehabilitation  Contact information:  [address] [facility] [address] [phone]           Deya Pack APRN .    Specialty: Nurse Practitioner  Why: Ramone BENTON (PCP) in 1 week  Contact information:  [address]  [address] [address]  [phone]                         Additional Instructions for the Follow-ups that You Need to Schedule     Ambulatory Referral to Cardiac Rehab   As directed      Discharge Follow-up with PCP   As directed       Currently Documented PCP:    Deya Pack APRN    PCP Phone Number:    [phone]     Follow Up Details: Ramone BENTON (PCP) in 1 week         Discharge Follow-up with Specified Provider: [facility]; 2 Weeks   As directed      To: [facility]    Follow Up: 2 Weeks           Time Spent on Discharge:  Greater than 30 minutes      Carlin Scherer MD  [facility]  07/22/22  15:38 EDT

## 2022-07-22 NOTE — PLAN OF CARE
Goal Outcome Evaluation:  Plan of Care Reviewed With: patient, spouse        Progress: improving  Outcome Evaluation: Patient is A&O X4. All VS stable. Back pain treated per MAR. No complaints of chest pain & SOA. Slept well. No distress noted overnight. Rt radial cath site soft/dry and intact. Will continue to monitor.

## 2022-07-22 NOTE — PAYOR COMM NOTE
"Heber Harley (47 y.o. Male)     PLEASE SEE ATTACHED FOR INPT AUTH.     REF#[id number]    PLEASE CALL   OR  [phone]    THANK YOU    YONG DE LEÓN LPN CCP            Date of Birth   1975    Social Security Number       Address   [address] [place] [address]    Home Phone   [phone]    MRN   [id number]       [place]   [place]    Marital Status                               Admission Date   7/20/22  OBS        7/22/22 INPT Admission Type   Urgent    Admitting Provider   Rossy Jacobs MD    Attending Provider   Carlin Scherer MD    Department, Room/Bed   [address], 2206/1       Discharge Date       Discharge Disposition       Discharge Destination                               Attending Provider: Carlin Scherer MD    Allergies: Wasp Venom    Isolation: None   Infection: None   Code Status: CPR   Advance Care Planning Activity    Ht: 177 cm (69.69\")   Wt: 110 kg (242 lb 8.1 oz)    Admission Cmt: None   Principal Problem: Chest pain [R07.9]                 Active Insurance as of 7/20/2022     Primary Coverage     Payor Plan Insurance Group Employer/Plan Group    [employer] [facility] [employer] EMPLOYEE [id number]     Payor Plan Address Payor Plan Phone Number Payor Plan Fax Number Effective Dates    [address] [phone]  1/1/2018 - None Entered    [place] [address]       Subscriber Name Subscriber Birth Date Member ID       BETTYE HARLEY 1975 [id number]                 Emergency Contacts      (Rel.) Home Phone Work Phone Mobile Phone    Bettye Harley (Spouse) -- -- [phone]               History & Physical      Lazara Rodas APRN at 07/20/22 2330     Attestation signed by Rossy Jacobs MD at 07/21/22 0117 (Updated)    I have personally interviewed and examined the patient.  I agree with Ms. Rodas nurse practitioner history and physical assessment.    History.  A very pleasant 47 " years old white gentleman with a past history of dyslipidemia/hypertension/obesity/obstructive sleep apnea/chronic low back pain syndrome/GERD.  Patient was transferred from an outside facility today because of chest pain and elevated troponin.  The patient problem started earlier today when he got stung by a wasp on his left ear and subsequently developed severe swelling and redness over the left side of the face and the left ear associated with wheezing and shortness of breath and nausea and vomiting.  He presented to an outside emergency department where he was found to be wheezing and short of breath and was treated for anaphylaxis with IV Solu-Medrol/IV Benadryl/DuoNeb/epinephrine.  His shortness of breath has improved however he then developed chest tightness and pressure that was relieved partially by nitroglycerin and labetalol.  Troponins were trending up but no acute EKG changes.  Patient was subsequently transferred here for further cardiology care after he was given aspirin and started on IV heparin.  At this time patient continues with left precordial chest tightness that has improved compared to before.  No shortness of breath/no wheezing/no palpitations/no PND/no orthopnea/no ankle edema/no cough.  No more nausea or vomiting.  No other GI//CNS symptoms.  On arrival EKG revealed no acute ischemic changes.  COVID is pending.  CMP is normal except a random blood sugar of 112, CO2 of 20.5, ALT 51, AST 69, anion gap 16.5.  Troponin elevated at 0.155.  INR was normal at 1.1.  PTT normal.  CBC normal except a white count of 14.02.  Physical examination.  Temperature 98.2 a pulse of 68 respiratory rate of 17 and blood pressure 129/84 O2 sats of 96% on 2 L  General.  Middle-aged gentleman.  Alert and oriented x3.  Obese by body mass index.  No apparent pain/distress/diaphoresis.  Normal mood and affect.  Eyes.  Pupils equal round and reactive.  Intact extraocular musculature.  No pallor or jaundice.  Normal  conjunctivae and lids.  Oral cavity.  Moist mucous membrane with no throat lesions or swelling.  Neck.  Supple.  No JVD.  No carotid bruit.  No lymphadenopathy or thyromegaly.  Cardiovascular.  Regular rate and rhythm.  No gallops or murmurs.  Chest.  Clear to auscultation bilaterally with no added sounds.  Abdomen.  Soft lax.  No tenderness.  No organomegaly.  No guarding or rebound.  Extremities.  No clubbing cyanosis or edema.  CNS.  No acute focal neurological deficits.  Skin.  Redness/swelling/hotness of the left pinna and left side of the face.  Diagnostics and labs.  All noted  Assessment and plan.  1.  Left pinna wasp sting leading to anaphylaxis.  Patient is status post epi injection/IV Pepcid/IV Solu-Medrol/IV Benadryl.  Improved symptomatology.  We will continue IV Solu-Medrol/IV Benadryl/IV Pepcid.  Resolved anaphylaxis.  2.  Chest pain/elevated troponin that could reflect demand ischemia versus non-ST elevation MI.  Patient had a stress test on 5/30/2017 that revealed a normal ejection fraction with normal perfusion and no ischemia.  Patient had a 2D echo on 7/1/2022 revealing a normal ejection fraction and normal diastolic dysfunction and no significant valvular disease.  At this time we will continue heparin drip/aspirin/nitroglycerin paste/lisinopril.  We will add low-dose beta-blockers and Plavix.  Cardiology consulted.  Might need a cardiac catheterization.  3.  Hyperlipidemia.  We will continue aspirin and start Lipitor.  Check fasting lipids.  4.  History of hypertension.  Good control on lisinopril.  We will add low-dose beta-blockers.  5.  Hyperglycemia.  Will check A1c.  6.  Elevated liver function test.  Benign GI examination.  Asymptomatic except nausea and vomiting.  Will monitor.  7.  VTE prophylaxis.  Patient is anticoagulated.  Discussed my findings and plan of treatment with the patient/ER physician outlying facility/wife/nurse practitioner    Patient was seen and examined prior to  midnight of 7/20/2022.  Dictation is delayed secondary to patient care.                    Patient Name:  Heber Harley  YOB: 1975  MRN:  [id number]  Admit Date:  7/20/2022  Patient Care Team:  Deya Pack APRN as PCP - General (Nurse Practitioner)      Subjective   History Present Illness     CC: Chest pain s/p wasp sting     History of Present Illness   Mr. Harley is a 47 y.o. former smoker with a history of essential hypertension, HLD, obesity, EVAN on CPAP, and obesity that has transferred here from [facility] secondary due to chest pain.  Patient reports around 8:30 AM, he was stung by wasp on his left ear.  He suddenly developed swelling to his ear and face as well as shortness of breath.  He went to the emergency department where he was treated for anaphylaxis.  He was given a DuoNeb treatment, epi, IV steroids, and IV Benadryl.  He then started having chest pressure and his troponins were elevated. He was started on a Heparin gtt and has transferred here to be evaluated by his cardiologist.    Review of Systems   Constitutional: Negative for chills and fever.   HENT: Negative for congestion and rhinorrhea.    Eyes: Negative for photophobia and visual disturbance.   Respiratory: Negative for cough and shortness of breath.    Cardiovascular: Positive for chest pain. Negative for palpitations.   Gastrointestinal: Negative for constipation, diarrhea, nausea and vomiting.   Endocrine: Negative for cold intolerance and heat intolerance.   Genitourinary: Negative for difficulty urinating and dysuria.   Musculoskeletal: Negative for gait problem and joint swelling.   Skin: Negative for rash and wound.   Neurological: Negative for dizziness, light-headedness and headaches.   Psychiatric/Behavioral: Negative for sleep disturbance and suicidal ideas.        Personal History     Past Medical History:   Diagnosis Date   • Chronic back pain     COM[facility] PN MGMT [place], NARC RX   • GERD  (gastroesophageal reflux disease)    • Hyperlipidemia    • Hypertension    • Hypertriglyceridemia    • Sleep apnea     On CPAP     Past Surgical History:   Procedure Laterality Date   • ANKLE SURGERY Right     ORIF   • HEMORRHOIDECTOMY N/A 11/3/2021    Procedure: HEMORRHOID BANDING;  Surgeon: Matthew Coronel MD;  Location: [facility];  Service: General;  Laterality: N/A;     Family History   Problem Relation Age of Onset   • Hypertension Mother    • Coronary artery disease Mother         Mother with stent   • Heart disease Father         Father with MI and 3 vessel CABG in upper 50's   • Hypertension Father    • Diabetes Maternal Uncle    • Cancer Maternal Uncle    • Cancer Paternal Grandfather    • Malig Hyperthermia Neg Hx      Social History     Tobacco Use   • Smoking status: Former Smoker     Years: 5.00     Quit date:      Years since quittin.5   • Smokeless tobacco: Current User   • Tobacco comment: Caffeine - 2 daily   Vaping Use   • Vaping Use: Never used   Substance Use Topics   • Alcohol use: Yes     Alcohol/week: 12.0 standard drinks     Types: 12 Cans of beer per week   • Drug use: Yes     Frequency: 7.0 times per week     Types: Marijuana     Comment: daily/PAIN MGMT RX NARC CH BACK PAIN     No current facility-administered medications on file prior to encounter.     Current Outpatient Medications on File Prior to Encounter   Medication Sig Dispense Refill   • aspirin 325 MG tablet Take 325 mg by mouth Daily.     • Calcium Carb-Cholecalciferol (Calcium + D3) 600-800 MG-UNIT tablet take 1 tablet by mouth twice daily 180 tablet 3   • diclofenac (VOLTAREN) 75 MG EC tablet May take 1 tablet by mouth twice daily as needed for pain 60 tablet 2   • doxycycline (VIBRAMYCIN) 100 MG capsule Take 1 capsule by mouth Every 12 (Twelve) Hours. 28 capsule 0   • HYDROcodone-acetaminophen (NORCO) 7.5-325 MG per tablet TAKE 1 TABLET 3 TIMES DAILY AS NEEDED FOR PAIN.*MAY TAKE EXTRA IF NEEDED* 100 tablet 0   •  lisinopril (PRINIVIL,ZESTRIL) 40 MG tablet Take 1 tablet by mouth Daily. (Patient taking differently: Take 40 mg by mouth Daily. Not started yet) 90 tablet 0   • nitroglycerin (NITROSTAT) 0.4 MG SL tablet Place 0.4 mg under the tongue Every 5 (Five) Minutes As Needed for Chest Pain. Take no more than 3 doses in 15 minutes.     • Omega-3 Fatty Acids (FISH OIL) 1000 MG capsule capsule Take 2,000 mg by mouth 2 (Two) Times a Day With Meals. INST PER ANESTHESIA PROTOCOL     • omeprazole (priLOSEC) 20 MG capsule TAKE ONE CAPSULE BY MOUTH TWICE A  capsule 1   • predniSONE (DELTASONE) 10 MG tablet Take 1 tablet by mouth Daily With Breakfast & Lunch for 5 days, THEN 1 tablet Every Morning Before Breakfast for 5 days. 15 tablet 0   • sildenafil (REVATIO) 20 MG tablet Take 1-2 tablets by mouth 1 hour prior to intercourse 30 tablet 2   • vitamin D (ERGOCALCIFEROL) 1.25 MG (22964 UT) capsule capsule Take 1 capsule by mouth 1 (One) Time Per Week. 12 capsule 3   • Vortioxetine HBr (Trintellix) 10 MG tablet TAKE 1 TABLET BY MOUTH EVERY DAY 90 tablet 1   • Bempedoic Acid-Ezetimibe (Nexlizet) 180-10 MG tablet Take 1 tablet by mouth Daily. 90 tablet 1   • HYDROcodone-acetaminophen (NORCO) 7.5-325 MG per tablet TAKE 1 TABLET 3 TIMES DAILY AS NEEDED FOR PAIN.*MAY TAKE EXTRA IF NEEDED* 100 tablet 0   • terbinafine (lamiSIL) 250 MG tablet Take 1 tablet by mouth Daily. 90 tablet 0   • Testosterone 30 MG/ACT solution 1 PUMP ON EACH AXILLA DAILY (Patient not taking: Reported on 7/20/2022) 90 mL 5   • Testosterone Cypionate (DEPOTESTOTERONE CYPIONATE) 200 MG/ML injection Inject 1 mL into the appropriate muscle as directed by prescriber Every 14 (Fourteen) Days. (Patient not taking: Reported on 7/20/2022) 2 mL 5     Allergies   Allergen Reactions   • Wasp Venom Anaphylaxis       Objective    Objective     Vital Signs  Temp:  [98.2 °F (36.8 °C)] 98.2 °F (36.8 °C)  Heart Rate:  [63-68] 68  Resp:  [17] 17  BP: (129-140)/(84-92)  129/84  SpO2:  [96 %] 96 %  on  Flow (L/min):  [2] 2;   Device (Oxygen Therapy): nasal cannula  Body mass index is 34.84 kg/m².    Physical Exam  Vitals reviewed.   Constitutional:       General: He is not in acute distress.     Appearance: He is well-developed. He is obese. He is not toxic-appearing.   HENT:      Head: Normocephalic and atraumatic.      Comments: Swelling and erythema noted to the left face  Eyes:      General: No scleral icterus.        Right eye: No discharge.         Left eye: No discharge.      Conjunctiva/sclera: Conjunctivae normal.   Neck:      Vascular: No JVD.   Cardiovascular:      Rate and Rhythm: Normal rate and regular rhythm.      Heart sounds: Normal heart sounds. No murmur heard.    No friction rub. No gallop.   Pulmonary:      Effort: Pulmonary effort is normal. No respiratory distress.      Breath sounds: Normal breath sounds. No wheezing or rales.   Abdominal:      General: Bowel sounds are normal. There is no distension.      Palpations: Abdomen is soft.      Tenderness: There is no abdominal tenderness. There is no guarding.   Musculoskeletal:         General: No tenderness or deformity. Normal range of motion.      Cervical back: Normal range of motion and neck supple.   Skin:     General: Skin is warm and dry.      Capillary Refill: Capillary refill takes less than 2 seconds.   Neurological:      Mental Status: He is alert and oriented to person, place, and time.   Psychiatric:         Behavior: Behavior normal.       Results Review:  I reviewed the patient's new clinical results.    Lab Results (last 24 hours)     Procedure Component Value Units Date/Time    Comprehensive Metabolic Panel [[id number]]  (Abnormal) Collected: 07/20/22 2159    Specimen: Blood Updated: 07/20/22 2313     Glucose 112 mg/dL      BUN 15 mg/dL      Creatinine 0.73 mg/dL      Sodium 140 mmol/L      Potassium 4.7 mmol/L      Chloride 103 mmol/L      CO2 20.5 mmol/L      Calcium 9.3 mg/dL      Total  Protein 7.0 g/dL      Albumin 4.70 g/dL      ALT (SGPT) 51 U/L      AST (SGOT) 69 U/L      Alkaline Phosphatase 61 U/L      Total Bilirubin 0.7 mg/dL      Globulin 2.3 gm/dL      A/G Ratio 2.0 g/dL      BUN/Creatinine Ratio 20.5     Anion Gap 16.5 mmol/L      eGFR 112.9 mL/min/1.73      Comment: National Kidney Foundation and American Society of Nephrology (ASN) Task Force recommended calculation based on the Chronic Kidney Disease Epidemiology Collaboration (CKD-EPI) equation refit without adjustment for race.       Narrative:      GFR Normal >60  Chronic Kidney Disease <60  Kidney Failure <15      Troponin [[id number]]  (Abnormal) Collected: 07/20/22 2159    Specimen: Blood Updated: 07/20/22 2315     Troponin T 0.155 ng/mL     Narrative:      Troponin T Reference Range:  <= 0.03 ng/mL-   Negative for AMI  >0.03 ng/mL-     Abnormal for myocardial necrosis.  Clinicians would have to utilize clinical acumen, EKG, Troponin and serial changes to determine if it is an Acute Myocardial Infarction or myocardial injury due to an underlying chronic condition.       Results may be falsely decreased if patient taking Biotin.      Protime-INR [[id number]]  (Abnormal) Collected: 07/20/22 2159    Specimen: Blood Updated: 07/20/22 2247     Protime 14.8 Seconds      INR 1.17    aPTT [[id number]]  (Normal) Collected: 07/20/22 2159    Specimen: Blood Updated: 07/20/22 2247     PTT 25.2 seconds     CBC & Differential [[id number]]  (Abnormal) Collected: 07/20/22 2159    Specimen: Blood Updated: 07/20/22 2240    Narrative:      The following orders were created for panel order CBC & Differential.  Procedure                               Abnormality         Status                     ---------                               -----------         ------                     CBC Auto Differential[[id number]]        Abnormal            Final result                 Please view results for these tests on the individual orders.    CBC Auto  Differential [[id number]]  (Abnormal) Collected: 07/20/22 2159    Specimen: Blood Updated: 07/20/22 2240     WBC 14.02 10*3/mm3      RBC 5.28 10*6/mm3      Hemoglobin 16.2 g/dL      Hematocrit 46.5 %      MCV 88.1 fL      MCH 30.7 pg      MCHC 34.8 g/dL      RDW 12.5 %      RDW-SD 39.3 fl      MPV 11.4 fL      Platelets 282 10*3/mm3      Neutrophil % 86.3 %      Lymphocyte % 9.3 %      Monocyte % 3.4 %      Eosinophil % 0.0 %      Basophil % 0.2 %      Immature Grans % 0.8 %      Neutrophils, Absolute 12.09 10*3/mm3      Lymphocytes, Absolute 1.31 10*3/mm3      Monocytes, Absolute 0.48 10*3/mm3      Eosinophils, Absolute 0.00 10*3/mm3      Basophils, Absolute 0.03 10*3/mm3      Immature Grans, Absolute 0.11 10*3/mm3      nRBC 0.0 /100 WBC     COVID PRE-OP / PRE-PROCEDURE SCREENING ORDER (NO ISOLATION) - Swab, Nasopharynx [[id number]] Collected: 07/20/22 2209    Specimen: Swab from Nasopharynx Updated: 07/20/22 2221    Narrative:      The following orders were created for panel order COVID PRE-OP / PRE-PROCEDURE SCREENING ORDER (NO ISOLATION) - Swab, Nasopharynx.  Procedure                               Abnormality         Status                     ---------                               -----------         ------                     COVID-19,APTIMA PANTHER(...[[id number]]                      In process                   Please view results for these tests on the individual orders.    COVID-19,APTIMA PANTHER(CHANEL), RADHA/ ARTEMIO, NP/OP SWAB IN UTM/VTM/SALINE TRANSPORT MEDIA,24 HR TAT - Swab, Nasopharynx [[id number]] Collected: 07/20/22 2209    Specimen: Swab from Nasopharynx Updated: 07/20/22 2221          Imaging Results (Last 24 Hours)     ** No results found for the last 24 hours. **          Results for orders placed during the hospital encounter of 07/01/22    Adult Transthoracic Echo Complete w/ Color, Spectral and Contrast if Necessary Per Protocol    Interpretation Summary  · Calculated left ventricular EF = 63%  Calculated left ventricular 3D EF = 56% Estimated left ventricular EF was in agreement with the calculated left ventricular EF. Left ventricular systolic function is normal.  · Left ventricular diastolic function was normal.  · Sclerotic trileaflet aortic valve with mild calcification of the noncoronary cusp noted without any significant stenosis or regurgitation.  · Normal right ventricular size and function.      ECG 12 Lead   Preliminary Result   HEART RATE= 61  bpm   RR Interval= 980  ms   [place] Interval= 138  ms   P Horizontal Axis= 30  deg   P Front Axis= 16  deg   QRSD Interval= 105  ms   QT Interval= 434  ms   QRS Axis= 71  deg   T Wave Axis= 58  deg   - NORMAL ECG -   Sinus rhythm   Electronically Signed By:    Date and Time of Study: 2022-07-20 20:43:42      ECG 12 Lead    (Results Pending)        Assessment/Plan     Active Hospital Problems    Diagnosis  POA   • **Chest pain [R07.9]  Yes   • Wasp sting-induced anaphylaxis [T63.461A]  Yes   • HLD (hyperlipidemia) [E78.5]  Yes   • Elevated troponin [R77.8]  Yes   • EVAN (obstructive sleep apnea) [G47.33]  Yes   • Obesity (BMI 30-39.9) [E66.9]  Yes   • Essential hypertension [I10]  Yes      Resolved Hospital Problems   No resolved problems to display.       Mr. Harley is a 47 y.o. former smoker with a history of hypertension, HLD, EVAN, and obesity who presents with chest pain.    Chest pain  -Chest pressure improving with Nitropaste.  -N.p.o. after midnight  -Heparin gtt  -ASA, Plavix, and beta blocker   -Cardiology consult  -Continue Nitropaste to chest  -Elevated troponin without facility, will continue to trend     Anaphylaxis  -Secondary to a wasp sting. He still has significant swelling to the right side of face  -IV steroids q8  -IV benadryl q6    Essential hypertension  -Blood pressure stable, will resume Lisinopril      EVAN  -May use home CPAP when available      I discussed the patient's findings and my recommendations with patient, family, nursing  staff and Dr. Jacobs.    VTE Prophylaxis - Heparin gtt.  Code Status - Full code.       TAMELA Kidd  [facility]  07/20/22  23:30 EDT    Electronically signed by Rossy Jacobs MD at 07/21/22 0117       Emergency Department Notes    No notes of this type exist for this encounter.         Oxygen Therapy (since admission)     Date/Time SpO2 Device (Oxygen Therapy) Flow (L/min) Oxygen Concentration (%) ETCO2 (mmHg)    07/22/22 1041 95 -- -- -- --    07/21/22 1900 98 -- -- -- --    07/21/22 1840 95 -- -- -- --    07/21/22 1700 99 -- -- -- --    07/21/22 1500 96 room air -- -- --    07/21/22 12:39:49 97 -- -- -- 39    07/21/22 12:30:03 97 -- -- -- 38 07/21/22 12:25:16 96 -- -- -- 38 07/21/22 12:20:27 96 -- -- -- 37    07/21/22 12:15:27 97 -- -- -- 42    07/21/22 12:10:33 98 -- -- -- 42    07/21/22 12:05:06 98 -- -- -- 41 07/21/22 12:00:09 91 nasal cannula with ETCO2 3 -- --    07/21/22 11:59:38 91 -- -- -- 41 07/21/22 11:54:26 95 -- -- -- 38 07/21/22 11:49:51 94 -- -- -- 41 07/21/22 11:44:36 96 -- -- -- 37    07/21/22 0815 -- room air -- -- --    07/21/22 0715 94 room air -- -- --    07/21/22 0325 95 -- -- -- --    07/20/22 2149 96 nasal cannula 2 -- --        Intake & Output (last 3 days)       07/19 0701 07/20 0700 07/20 0701 07/21 0700 07/21 0701 07/22 0700 07/22 0701 07/23 0700    P.O.   240     I.V. (mL/kg)   125.5 (1.1)     Total Intake(mL/kg)   365.5 (3.3)     Urine (mL/kg/hr)  250  500 (1)    Stool  0      Total Output  250  500    Net  -250 +365.5 -500            Urine Unmeasured Occurrence  250 x 1101 x     Stool Unmeasured Occurrence  1 x 1 x          Lines, Drains & Airways     Active LDAs     Name Placement date Placement time Site Days    Peripheral IV (Ped/Myles) 07/20/22 Right Antecubital 07/20/22 2000  -- 1          Inactive LDAs     Name Placement date Placement time Removal date Removal time Site Days    [REMOVED] Arterial Sheath 6 Fr. Right  Radial 07/21/22  1206  07/21/22  1243  Radial  less than 1                  Medication Administration Report for Heber Harley as of 07/22/22 1138   Legend:    Given Hold Not Given Due Canceled Entry Other Actions    Time Time (Time) Time  Time-Action       Discontinued     Completed     Future     MAR Hold     Linked           Medications 07/20/22 07/21/22 07/22/22    acetaminophen (TYLENOL) tablet 650 mg  Dose: 650 mg  Freq: Every 4 Hours PRN Route: PO  PRN Reason: Mild Pain   Start: 07/20/22 2106   Admin Instructions:   Do not exceed 4 grams of acetaminophen in a 24 hr period. Max dose of 2gm for AST/ALT greater than 120 units/L    If given for fever, use fever parameter: fever greater than 100.4 °F.    If given for pain, use the following pain scale:   Mild Pain = Pain Score of 1-3, CPOT 1-2  Moderate Pain = Pain Score of 4-6, CPOT 3-4  Severe Pain = Pain Score of 7-10, CPOT 5-8     1135-MAR Hold     1301-MAR Unhold            Or  acetaminophen (TYLENOL) 160 MG/5ML solution 650 mg  Dose: 650 mg  Freq: Every 4 Hours PRN Route: PO  PRN Reason: Mild Pain   Start: 07/20/22 2106   Admin Instructions:   Do not exceed 4 grams of acetaminophen in a 24 hr period. Max dose of 2gm for AST/ALT greater than 120 units/L    If given for fever, use fever parameter: fever greater than 100.4 °F.    If given for pain, use the following pain scale:   Mild Pain = Pain Score of 1-3, CPOT 1-2  Moderate Pain = Pain Score of 4-6, CPOT 3-4  Severe Pain = Pain Score of 7-10, CPOT 5-8     1135-MAR Hold     1301-MAR Unhold            Or  acetaminophen (TYLENOL) suppository 650 mg  Dose: 650 mg  Freq: Every 4 Hours PRN Route: RE  PRN Reason: Mild Pain   Start: 07/20/22 2106   Admin Instructions:   Do not exceed 4 grams of acetaminophen in a 24 hr period. Max dose of 2gm for AST/ALT greater than 120 units/L    If given for fever, use fever parameter: fever greater than 100.4 °F.    If given for pain, use the following pain scale:   Mild  Pain = Pain Score of 1-3, CPOT 1-2  Moderate Pain = Pain Score of 4-6, CPOT 3-4  Severe Pain = Pain Score of 7-10, CPOT 5-8     1135-MAR Hold     1301-MAR Unhold             aluminum-magnesium hydroxide-simethicone (MAALOX MAX) 400-400-40 MG/5ML suspension 15 mL  Dose: 15 mL  Freq: Every 6 Hours PRN Route: PO  PRN Reason: Heartburn  Start: 07/20/22 2106   Admin Instructions:   Maximum 60 mL in 24 hours.     1135-MAR Hold     1301-MAR Unhold             aspirin EC tablet 81 mg  Dose: 81 mg  Freq: Daily Route: PO  Start: 07/21/22 1400   Admin Instructions:   Herbal/drug interaction: Avoid use with ginkgo biloba. Do not crush or chew.  Do not exceed 4 grams of aspirin in a 24 hr period.    If given for pain, use the following pain scale:   Mild Pain = Pain Score of 1-3, CPOT 1-2  Moderate Pain = Pain Score of 4-6, CPOT 3-4  Severe Pain = Pain Score of 7-10, CPOT 5-8     1308-Canceled Entry          1053-Given             atorvastatin (LIPITOR) tablet 40 mg  Dose: 40 mg  Freq: Daily Route: PO  Start: 07/21/22 0200   Admin Instructions:   Avoid grapefruit juice.     0120-Given     1135-MAR Hold     1301-MAR Unhold        1051-Given             cetirizine (zyrTEC) tablet 10 mg  Dose: 10 mg  Freq: 2 Times Daily Route: PO  Start: 07/22/22 1215      1215     2100            clopidogrel (PLAVIX) tablet 75 mg  Dose: 75 mg  Freq: Daily Route: PO  Start: 07/21/22 0900     0815-Given     1135-MAR Hold     1301-MAR Unhold        1053-Given             diphenhydrAMINE (BENADRYL) injection 25 mg  Dose: 25 mg  Freq: Every 6 Hours Route: IV  Start: 07/21/22 0100   End: 07/22/22 1119   Admin Instructions:   25 mg may be given IV push over less than 1 minute.  Caution: Look alike/sound alike drug alert. This med may be ordered in other forms and routes. Before giving verify the last time the drug was given by any route/form.       0105-Given     0517-Given     1135-MAR Hold       1300-MAR Hold     1301-MAR Unhold     1850-Given [C]        2252-Given          0626-Given             famotidine (PEPCID) tablet 20 mg  Dose: 20 mg  Freq: 2 Times Daily Before Meals Route: PO  Start: 07/22/22 1100      1054-Given     1730            HYDROcodone-acetaminophen (NORCO) 5-325 MG per tablet 1 tablet  Dose: 1 tablet  Freq: Every 4 Hours PRN Route: PO  PRN Reason: Moderate Pain   Start: 07/21/22 1301   End: 07/31/22 1300   Admin Instructions:   [CHARLIE]    Do not exceed 4 grams of acetaminophen in a 24 hr period. Max dose of 2gm for AST/ALT greater than 120 units/L        If given for pain, use the following pain scale:   Mild Pain = Pain Score of 1-3, CPOT 1-2  Moderate Pain = Pain Score of 4-6, CPOT 3-4  Severe Pain = Pain Score of 7-10, CPOT 5-8     1326-Given     1849-Given     2252-Given        0631-Given     1053-Given            lisinopril (PRINIVIL,ZESTRIL) tablet 20 mg  Dose: 20 mg  Freq: Daily Route: PO  Start: 07/21/22 0900     0815-Given          1051-Given             methylPREDNISolone (MEDROL) dose pack 4 mg  Dose: 4 mg  Freq: After Lunch Route: PO  Start: 07/22/22 1300   End: 07/23/22 1259   Admin Instructions:   All day-1 doses may be given (up to 6 tablets) may be given at one time based on time of day.  Caution: Look alike/sound alike drug alert. Take with food.  Avoid grapefruit juice.      1300             methylPREDNISolone (MEDROL) dose pack 8 mg  Dose: 8 mg  Freq: Before Breakfast Route: PO  Start: 07/22/22 1215   End: 07/23/22 0729   Admin Instructions:   All day-1 doses may be given (up to 6 tablets) may be given at one time based on time of day.  Caution: Look alike/sound alike drug alert. Take with food.  Avoid grapefruit juice.      1215             methylPREDNISolone sodium succinate (SOLU-Medrol) injection 60 mg  Dose: 60 mg  Freq: Every 8 Hours Route: IV  Start: 07/21/22 0100   End: 07/22/22 1119   Admin Instructions:   Caution: Look alike/sound alike drug alert     0105-Given     0815-Given     1135-MAR Hold       1301-MAR  Unhold     1848-Given     2252-Given        1054-Given             metoprolol tartrate (LOPRESSOR) tablet 12.5 mg  Dose: 12.5 mg  Freq: Every 12 Hours Scheduled Route: PO  Start: 07/21/22 0030     0000-Given     0825-Given     2126-Given        1054-Given     2100            nitroglycerin (NITROSTAT) ointment 1 inch  Dose: 1 inch  Freq: Every 6 Hours Scheduled Route: TOP  Start: 07/20/22 2200   Admin Instructions:   Apply to chest wall. If scheduled every 6 hours, omit the midnight dose to allow for nitrate free interval.    2141-Given          (0516)-Not Given     1135-MAR Hold     1159-Medication Removed [C]       1200-MAR Hold     1301-MAR Unhold     1817-Hold [C]       (2252)-Not Given          (0358)-Not Given     1200     1800           nitroglycerin (NITROSTAT) SL tablet 0.4 mg  Dose: 0.4 mg  Freq: Every 5 Minutes PRN Route: SL  PRN Reason: Chest Pain  PRN Comment: Only if SBP Greater Than 100  Start: 07/20/22 2035   Admin Instructions:   If Pain Unrelieved After 3 Doses Notify MD    2056-Given          1135-MAR Hold     1301-MAR Unhold             ondansetron (ZOFRAN) tablet 4 mg  Dose: 4 mg  Freq: Every 6 Hours PRN Route: PO  PRN Reasons: Nausea,Vomiting  Start: 07/20/22 2106   Admin Instructions:   If BOTH ondansetron (ZOFRAN) and promethazine (PHENERGAN) are ordered use ondansetron first and THEN promethazine IF ondansetron is ineffective.     1135-MAR Hold     1301-MAR Unhold            Or  ondansetron (ZOFRAN) injection 4 mg  Dose: 4 mg  Freq: Every 6 Hours PRN Route: IV  PRN Reasons: Nausea,Vomiting  Start: 07/20/22 2106   Admin Instructions:   If BOTH ondansetron (ZOFRAN) and promethazine (PHENERGAN) are ordered use ondansetron first and THEN promethazine IF ondansetron is ineffective.     1135-MAR Hold     1301-MAR Unhold             sodium chloride 0.9 % flush 10 mL  Dose: 10 mL  Freq: As Needed Route: IV  PRN Reason: Line Care  Start: 07/20/22 2105     1135-MAR [facility]     1301-MAR hold             Future Medications  Medications 07/20/22 07/21/22 07/22/22       methylPREDNISolone (MEDROL) dose pack 4 mg  Dose: 4 mg  Freq: Before Breakfast Route: PO  Start: 07/27/22 0730   End: 07/28/22 0729   Admin Instructions:   Day 6  Caution: Look alike/sound alike drug alert. Take with food.  Avoid grapefruit juice.          methylPREDNISolone (MEDROL) dose pack 4 mg  Dose: 4 mg  Freq: Nightly - One Time Route: PO  Start: 07/26/22 2100   End: 07/27/22 2059   Admin Instructions:   Day 5  Caution: Look alike/sound alike drug alert. Take with food.  Avoid grapefruit juice.          methylPREDNISolone (MEDROL) dose pack 4 mg  Dose: 4 mg  Freq: Before Breakfast Route: PO  Start: 07/26/22 0730   End: 07/27/22 0729   Admin Instructions:   Day 5  Caution: Look alike/sound alike drug alert. Take with food.  Avoid grapefruit juice.          methylPREDNISolone (MEDROL) dose pack 4 mg  Dose: 4 mg  Freq: 3 Times Daily Around Food Route: PO  Start: 07/25/22 0730   End: 07/26/22 0729   Admin Instructions:   Day 4  Caution: Look alike/sound alike drug alert. Take with food.  Avoid grapefruit juice.          methylPREDNISolone (MEDROL) dose pack 4 mg  Dose: 4 mg  Freq: 4 Times Daily Tapering Route: PO  Start: 07/24/22 0730   End: 07/25/22 0729   Admin Instructions:   Day 3  Caution: Look alike/sound alike drug alert. Take with food.  Avoid grapefruit juice.          methylPREDNISolone (MEDROL) dose pack 4 mg  Dose: 4 mg  Freq: 3 Times Daily Around Food Route: PO  Start: 07/23/22 0730   End: 07/24/22 0729   Admin Instructions:   Day 2  Caution: Look alike/sound alike drug alert. Take with food.  Avoid grapefruit juice.          methylPREDNISolone (MEDROL) dose pack 4 mg  Dose: 4 mg  Freq: After Dinner Route: PO  Start: 07/22/22 1900   End: 07/23/22 1859   Admin Instructions:   All day-1 doses may be given (up to 6 tablets) may be given at one time based on time of day.  Caution: Look alike/sound alike drug alert. Take with food.  Avoid  grapefruit juice.      1900             methylPREDNISolone (MEDROL) dose pack 8 mg  Dose: 8 mg  Freq: Nightly - One Time Route: PO  Start: 07/23/22 2100   End: 07/24/22 2059   Admin Instructions:   Day 2  Caution: Look alike/sound alike drug alert. Take with food.  Avoid grapefruit juice.          methylPREDNISolone (MEDROL) dose pack 8 mg  Dose: 8 mg  Freq: Nightly - One Time Route: PO  Start: 07/22/22 2100   End: 07/23/22 2059   Admin Instructions:   All day-1 doses may be given (up to 6 tablets) may be given at one time based on time of day.  Caution: Look alike/sound alike drug alert. Take with food.  Avoid grapefruit juice.      2100            Completed Medications  Medications 07/20/22 07/21/22 07/22/22       clopidogrel (PLAVIX) tablet 300 mg  Dose: 300 mg  Freq: Once Route: PO  Start: 07/21/22 0030   End: 07/21/22 0000     0000-Given              naproxen (NAPROSYN) tablet 500 mg  Dose: 500 mg  Freq: Once Route: PO  Start: 07/21/22 2145   End: 07/21/22 2126   Admin Instructions:   If given for pain, use the following pain scale:  Mild Pain = Pain Score of 1-3, CPOT 1-2  Moderate Pain = Pain Score of 4-6, CPOT 3-4  Severe Pain = Pain Score of 7-10, CPOT 5-8     2126-Given              perflutren (DEFINITY) 8.476 mg in Sodium Chloride (PF) 0.9 % 10 mL injection  Dose: 2 mL  Freq: Once in Imaging Route: IV  Start: 07/22/22 0930   End: 07/22/22 0937   Admin Instructions:   Mix 1.3 mL of mechanically activated Definity with 8.7 mL of normal saline. A total of 2 mL of the resulting Definity solution was administered.      0937-Given             sodium chloride 0.9 % infusion  Freq: Continuous PRN  Start: 07/21/22 1144   End: 07/21/22 1144     1144-New Bag [C]     1818-Stopped            Discontinued Medications  Medications 07/20/22 07/21/22 07/22/22       acetaminophen (TYLENOL) tablet 650 mg  Dose: 650 mg  Freq: Every 4 Hours PRN Route: PO  PRN Reasons: Mild Pain ,Fever  PRN Comment: temperature greater than  101F  Start: 07/21/22 1301   End: 07/21/22 1303   Admin Instructions:   Do not exceed 4 grams of acetaminophen in a 24 hr period. Max dose of 2gm for AST/ALT greater than 120 units/L    If given for fever, use fever parameter: fever greater than 100.4 °F.    If given for pain, use the following pain scale:   Mild Pain = Pain Score of 1-3, CPOT 1-2  Moderate Pain = Pain Score of 4-6, CPOT 3-4  Severe Pain = Pain Score of 7-10, CPOT 5-8          aspirin tablet 325 mg  Dose: 325 mg  Freq: Daily Route: PO  Start: 07/21/22 0900   End: 07/21/22 1304   Admin Instructions:   Do not exceed 4 grams of aspirin in a 24 hr period.    If given for pain, use the following pain scale:   Mild Pain = Pain Score of 1-3, CPOT 1-2  Moderate Pain = Pain Score of 4-6, CPOT 3-4  Severe Pain = Pain Score of 7-10, CPOT 5-8     0815-Given     1135-MAR Hold     1301-MAR Unhold            atorvastatin (LIPITOR) tablet 40 mg  Dose: 40 mg  Freq: Nightly Route: PO  Start: 07/21/22 2100   End: 07/21/22 1303   Admin Instructions:   Avoid grapefruit juice.          BH (CUPID ONLY) ADENOSINE 6 MG/100ML MIXTURE injection  Freq: As Needed  Start: 07/21/22 1235   End: 07/21/22 1247     1235-Given              clopidogrel (PLAVIX) tablet  Freq: As Needed  Start: 07/21/22 1249   End: 07/21/22 1251     1249-Given [C]              clopidogrel (PLAVIX) tablet 75 mg  Dose: 75 mg  Freq: Daily Route: PO  Start: 07/22/22 0900   End: 07/21/22 1303          famotidine (PEPCID) injection 20 mg  Dose: 20 mg  Freq: Every 12 Hours Scheduled Route: IV  Start: 07/21/22 0100   End: 07/22/22 1009   Admin Instructions:   Give IV push over 2 minutes.     0105-Given     0824-Given     2126-Given        (1055)-Not Given [C]             fentaNYL citrate (PF) (SUBLIMAZE) injection  Freq: As Needed  Start: 07/21/22 1145   End: 07/21/22 1247     1145-Given [C]     1154-Given [C]     1221-Given [C]            heparin (porcine) injection  Freq: As Needed  Start: 07/21/22 1211   End:  07/21/22 1247     1211-Given [C]     1224-Given [C]             heparin 43106 units/250 mL (100 units/mL) in 0.45 % NaCl infusion  Rate: 10 mL/hr Dose: 8.93 Units/kg/hr  Weight Dosing Info: 112 kg  Freq: Titrated Route: IV  Indications of Use: ACUTE CORONARY SYNDROME  Start: 07/20/22 2200   End: 07/21/22 1254   Admin Instructions:   Weight Based Heparin Nomogram Cardiac or Other Not VTE: Initial Heparin Infusion 12 units/kg/hr (initial max of 1,000 units/hr)    PTT  Less Than 54 sec:  Increase Dose By 3 units/kg/hr.  PTT 54-63 sec: Increase Dose By 1 units/kg/hr.  PTT 64-85 sec: No Dose Change.  PTT  sec: Decrease Dose By 2 units/kg/hr.  PTT > 104 sec:  Hold Infusion 1 hour.  Decrease Dose By 3 units/kg/hr. Repeat PTT in 6 hours.  If PTT Remains Greater Than 104 Stop Heparin Drip & Contact Provider    2138-Canceled Entry [C]     2141-Canceled Entry     2201-New Bag        0437-Rate Change (DUAL SIGN)     0800-Currently Infusing     0904-Canceled Entry [C]       1115-Currently Infusing     1325-Stopped             HYDROcodone-acetaminophen (NORCO) 7.5-325 MG per tablet 1 tablet  Dose: 1 tablet  Freq: Every 4 Hours PRN Route: PO  PRN Reason: Moderate Pain   Start: 07/20/22 2324   End: 07/21/22 1303   Admin Instructions:   [CHARLIE]    Do not exceed 4 grams of acetaminophen in a 24 hr period. Max dose of 2gm for AST/ALT greater than 120 units/L        If given for pain, use the following pain scale:   Mild Pain = Pain Score of 1-3, CPOT 1-2  Moderate Pain = Pain Score of 4-6, CPOT 3-4  Severe Pain = Pain Score of 7-10, CPOT 5-8    2334-Given          0522-Given     0905-Given     1135-MAR Hold       1301-MAR Unhold              iopamidol (ISOVUE-370) 76 % injection  Freq: As Needed  Start: 07/21/22 1250   End: 07/21/22 1251     1250-Given              lidocaine (XYLOCAINE) 2% injection  Freq: As Needed  Start: 07/21/22 1204   End: 07/21/22 1247     1204-Given              lisinopril (PRINIVIL,ZESTRIL) tablet 40  mg  Dose: 40 mg  Freq: Daily Route: PO  Start: 22 0900   End: 22 0003          midazolam (VERSED) injection  Freq: As Needed  Start: 22 1145   End: 22 1247     1145-Given [C]     1154-Given [C]     1221-Given [C]            nitroprusside (NIPRIDE) injection  Freq: As Needed  Start: 22 1235   End: 22 1247     1235-Given              ondansetron (ZOFRAN) tablet 4 mg  Dose: 4 mg  Freq: Every 6 Hours PRN Route: PO  PRN Reasons: Nausea,Vomiting  Start: 22 1301   End: 22 1304         Or  ondansetron (ZOFRAN) injection 4 mg  Dose: 4 mg  Freq: Every 6 Hours PRN Route: IV  PRN Reasons: Nausea,Vomiting  Start: 22 1301   End: 22 1304          pantoprazole (PROTONIX) EC tablet 40 mg  Dose: 40 mg  Freq: Every Morning Route: PO  Start: 22 0700   End: 22 2341   Admin Instructions:   Swallow whole; do not crush, split, or chew.          pantoprazole (PROTONIX) injection 40 mg  Dose: 40 mg  Freq: Every Early Morning Route: IV  Indications of Use: STRESS ULCER PROPHYLAXIS  Start: 22 0600   End: 22 0001   Admin Instructions:   Dilute with 10 mL of 0.9% NaCl and give IV push over 2 minutes.          sodium chloride 0.9 % infusion  Rate: 125 mL/hr Dose: 125 mL/hr  Freq: Continuous Route: IV  Start: 22 1400   End: 22 2308     1309-Currently Infusing     1819-Stopped             verapamil (ISOPTIN) 500 mcg, nitroglycerin (TRIDIL) 200 mcg, heparin (porcine) 3,000 Units radial artery injection  Freq: As Needed  Start: 22 1206   End: 22 1247     1206-Given                     Operative/Procedure Notes (all)    No notes of this type exist for this encounter.            Physician Progress Notes (all)      Carlin Scherer MD at 22 1121              Name: Heber Harley ADMIT: 2022   : 1975  PCP: Deya Pack APRN    MRN: [id number] LOS: 0 days   AGE/SEX: 47 y.o. male  ROOM: [facility]     Subjective   Subjective   No  longer c/o chest pressure, SOA, and nausea. No palp, diaphoresis, vomiting.   Left ear feeling better and swelling in face much improved. No stridor, speech changes, dysphagia, hearing changes.    Review of Systems    as above    Objective   Objective   Vital Signs  Temp:  [97.6 °F (36.4 °C)-98.4 °F (36.9 °C)] 98 °F (36.7 °C)  Heart Rate:  [60-76] 73  Resp:  [14-20] 18  BP: (108-143)/(74-98) 143/92  SpO2:  [91 %-99 %] 95 %  on  Flow (L/min):  [3] 3;   Device (Oxygen Therapy): room air  Body mass index is 35.11 kg/m².  Physical Exam  Vitals and nursing note reviewed. Exam conducted with a chaperone present (Wife).   Constitutional:       General: He is not in acute distress.     Appearance: He is obese. He is not ill-appearing, toxic-appearing or diaphoretic.   HENT:      Head: Normocephalic and atraumatic.      Right Ear: External ear normal.      Left Ear: External ear normal.      Ears:      Comments: Left ear looks fine to me today     Nose: Nose normal.      Mouth/Throat:      Mouth: Mucous membranes are moist.      Pharynx: Oropharynx is clear.   Eyes:      General: No scleral icterus.        Right eye: No discharge.         Left eye: No discharge.      Extraocular Movements: Extraocular movements intact.      Conjunctiva/sclera: Conjunctivae normal.   Cardiovascular:      Rate and Rhythm: Normal rate and regular rhythm.      Pulses: Normal pulses.      Heart sounds: Normal heart sounds.   Pulmonary:      Effort: Pulmonary effort is normal. No respiratory distress.      Breath sounds: Normal breath sounds. No stridor. No wheezing or rales.   Chest:      Chest wall: No tenderness.   Abdominal:      General: Bowel sounds are normal. There is no distension.      Palpations: Abdomen is soft.      Tenderness: There is no abdominal tenderness.   Musculoskeletal:         General: No swelling or deformity. Normal range of motion.      Cervical back: Neck supple. No rigidity.   Lymphadenopathy:      Cervical: No  cervical adenopathy.   Skin:     General: Skin is warm and dry.      Capillary Refill: Capillary refill takes less than 2 seconds.      Coloration: Skin is not jaundiced.   Neurological:      General: No focal deficit present.      Mental Status: He is alert and oriented to person, place, and time. Mental status is at baseline.      Cranial Nerves: No cranial nerve deficit.      Coordination: Coordination normal.      Gait: Gait normal.   Psychiatric:         Mood and Affect: Mood normal.         Behavior: Behavior normal.         Thought Content: Thought content normal.         Judgment: Judgment normal.         Results Review     I reviewed the patient's new clinical results.  Results from last 7 days   Lab Units 07/22/22  0300 07/21/22 0258 07/20/22 2159   WBC 10*3/mm3 20.53* 16.46* 14.02*   HEMOGLOBIN g/dL 15.8 15.4 16.2   PLATELETS 10*3/mm3 306 291 282     Results from last 7 days   Lab Units 07/22/22  0300 07/21/22 0258 07/20/22 2159   SODIUM mmol/L 137 139 140   POTASSIUM mmol/L 4.5 4.9 4.7   CHLORIDE mmol/L 101 101 103   CO2 mmol/L 20.7* 23.5 20.5*   BUN mg/dL 25* 18 15   CREATININE mg/dL 0.96 0.90 0.73*   GLUCOSE mg/dL 143* 137* 112*   EGFR mL/min/1.73 98.1 106.0 112.9     Results from last 7 days   Lab Units 07/22/22  0300 07/21/22 0258 07/20/22 2159   ALBUMIN g/dL 4.50 4.30 4.70   BILIRUBIN mg/dL 0.6 0.7 0.7   ALK PHOS U/L 57 58 61   AST (SGOT) U/L 74* 81* 69*   ALT (SGPT) U/L 50* 47* 51*     Results from last 7 days   Lab Units 07/22/22  0300 07/21/22 0258 07/20/22 2159   CALCIUM mg/dL 9.0 8.8 9.3   ALBUMIN g/dL 4.50 4.30 4.70   MAGNESIUM mg/dL 2.4  --   --        Hemoglobin A1C   Date/Time Value Ref Range Status   07/20/2022 2159 5.70 (H) 4.80 - 5.60 % Final       No radiology results for the last day  Scheduled Medications  aspirin, 81 mg, Oral, Daily  atorvastatin, 40 mg, Oral, Daily  cetirizine, 10 mg, Oral, BID  clopidogrel, 75 mg, Oral, Daily  famotidine, 20 mg, Oral, BID AC  lisinopril, 20  mg, Oral, Daily  methylPREDNISolone, 4 mg, Oral, After Lunch  methylPREDNISolone, 4 mg, Oral, After Dinner  [START ON 7/23/2022] methylPREDNISolone, 4 mg, Oral, TID Around Food  [START ON 7/24/2022] methylPREDNISolone, 4 mg, Oral, 4x Daily Taper  [START ON 7/25/2022] methylPREDNISolone, 4 mg, Oral, TID Around Food  [START ON 7/26/2022] methylPREDNISolone, 4 mg, Oral, Before Breakfast  [START ON 7/26/2022] methylPREDNISolone, 4 mg, Oral, Tonight  [START ON 7/27/2022] methylPREDNISolone, 4 mg, Oral, Before Breakfast  methylPREDNISolone, 8 mg, Oral, Before Breakfast  methylPREDNISolone, 8 mg, Oral, Tonight  [START ON 7/23/2022] methylPREDNISolone, 8 mg, Oral, Tonight  metoprolol tartrate, 12.5 mg, Oral, Q12H  nitroglycerin, 1 inch, Topical, Q6H    Infusions   Diet  Diet Regular; Cardiac      Assessment/Plan     Active Hospital Problems    Diagnosis  POA   • **Chest pain [R07.9]  Yes   • NSTEMI (non-ST elevated myocardial infarction) (HCC) [I21.4]  Yes   • Wasp sting-induced anaphylaxis [T63.461A]  Yes   • Leukocytosis [D72.829]  Yes   • Hyperglycemia, drug-induced [R73.9, T50.905A]  Yes   • HLD (hyperlipidemia) [E78.5]  Yes   • Elevated troponin [R77.8]  Yes   • EVAN (obstructive sleep apnea) [G47.33]  Yes   • Obesity (BMI 30-39.9) [E66.9]  Yes   • Essential hypertension [I10]  Yes      Resolved Hospital Problems   No resolved problems to display.       47 y.o. male admitted with Chest pain and elevated Trop in setting of treatment for wasp sting/anaphylaxis.    Left pinna wasp sting leading to anaphylaxis.    Patient is s/p Epi injection/IV Pepcid/IV SoluMedrol/IV Benadryl at outside ER with good results. Will change IV SoluMedrol to Medrol dose pack today in anticipation of dc. Change Benadryl to BID Zyrtec for the next week. Continue PO Pepcid for the next week as well. Will need Epi-Pen at dc.    Chest pain/elevated troponin (demand ischemia vs non-ST elevation MI?).  Left heart cath 7/21 with SID to OM1. Continue  Plavix/ASA/nitro paste/lisinopril/metoprolol/Lipitor. Cardiology consult appreciated. Trop trending up 0.155>>0.447>>>0.832. EKGs pretty benign. Echo this AM pending. No longer symptomatic. Maybe home later today after Card weighs in.    Leukocytosis.  Suspect due to stress reaction as well as steroids. No s/sx of infection. Continue to monitor as outpt through PCP office.    Hyperlipidemia.  Started Lipitor here. Reviewed lipid panel--trigs high and HDL low. Has only ever been on Simvastatin in past--did not tolerate due to muscle side effects. If doesn't tolerate Lipitor then consider changing to hydrophilic statin (Crestor).     Hypertension.   Good control on lisinopril. Have added low-dose beta-blocker given his presentation.     Hyperglycemia.  A1c only 5.7. Suspect due to steroids. Continue to monitor.     Elevated liver function test.    Benign GI examination. Suspect due to fatty liver. Will need to continue to monitor as outpt through PCP office or Card office.       · SCDs for DVT prophylaxis.  · Full code.  · Discussed with patient, wife, and RN.  · Anticipate discharge home with family later today.      Carlin Scherer MD  [facility]  22  11:22 EDT        Electronically signed by Carlin Scherer MD at 22 1128     Carlin Scherer MD at 22 0929              Name: Heber Harley ADMIT: 2022   : 1975  PCP: Deya Pack APRN    MRN: [id number] LOS: 0 days   AGE/SEX: 47 y.o. male  ROOM: 220/     Subjective   Subjective   Still c/o chest pressure, SOA, and nausea. No palp, diaphoresis, vomiting.   Left ear feeling better and swelling in face much improved. No stridor, speech changes, dysphagia, hearing changes.    Review of Systems  as above    Objective   Objective   Vital Signs  Temp:  [98.2 °F (36.8 °C)-98.4 °F (36.9 °C)] 98.3 °F (36.8 °C)  Heart Rate:  [61-73] 61  Resp:  [17] 17  BP: (113-140)/(60-92) 114/83  SpO2:  [94 %-96 %] 94 %  on  Flow  (L/min):  [2] 2;   Device (Oxygen Therapy): room air  Body mass index is 34.84 kg/m².  Physical Exam  Vitals and nursing note reviewed. Exam conducted with a chaperone present (Family x 3).   Constitutional:       General: He is not in acute distress.     Appearance: He is obese. He is not ill-appearing, toxic-appearing or diaphoretic.   HENT:      Head: Normocephalic and atraumatic.      Right Ear: External ear normal.      Left Ear: External ear normal.      Ears:      Comments: Left ear looks fine to me today     Nose: Nose normal.      Mouth/Throat:      Mouth: Mucous membranes are moist.      Pharynx: Oropharynx is clear.   Eyes:      General: No scleral icterus.        Right eye: No discharge.         Left eye: No discharge.      Extraocular Movements: Extraocular movements intact.      Conjunctiva/sclera: Conjunctivae normal.   Cardiovascular:      Rate and Rhythm: Normal rate and regular rhythm.      Pulses: Normal pulses.      Heart sounds: Normal heart sounds.   Pulmonary:      Effort: Pulmonary effort is normal. No respiratory distress.      Breath sounds: Normal breath sounds. No stridor. No wheezing or rales.   Chest:      Chest wall: No tenderness.   Abdominal:      General: Bowel sounds are normal. There is no distension.      Palpations: Abdomen is soft.      Tenderness: There is no abdominal tenderness.   Musculoskeletal:         General: No swelling or deformity. Normal range of motion.      Cervical back: Neck supple. No rigidity.   Lymphadenopathy:      Cervical: No cervical adenopathy.   Skin:     General: Skin is warm and dry.      Capillary Refill: Capillary refill takes less than 2 seconds.      Coloration: Skin is not jaundiced.   Neurological:      General: No focal deficit present.      Mental Status: He is alert and oriented to person, place, and time. Mental status is at baseline.      Cranial Nerves: No cranial nerve deficit.      Coordination: Coordination normal.      Gait: Gait  normal.   Psychiatric:         Mood and Affect: Mood normal.         Behavior: Behavior normal.         Thought Content: Thought content normal.         Judgment: Judgment normal.         Results Review     I reviewed the patient's new clinical results.  Results from last 7 days   Lab Units 07/21/22  0258 07/20/22 2159   WBC 10*3/mm3 16.46* 14.02*   HEMOGLOBIN g/dL 15.4 16.2   PLATELETS 10*3/mm3 291 282     Results from last 7 days   Lab Units 07/21/22  0258 07/20/22 2159   SODIUM mmol/L 139 140   POTASSIUM mmol/L 4.9 4.7   CHLORIDE mmol/L 101 103   CO2 mmol/L 23.5 20.5*   BUN mg/dL 18 15   CREATININE mg/dL 0.90 0.73*   GLUCOSE mg/dL 137* 112*   EGFR mL/min/1.73 106.0 112.9     Results from last 7 days   Lab Units 07/21/22  0258 07/20/22 2159   ALBUMIN g/dL 4.30 4.70   BILIRUBIN mg/dL 0.7 0.7   ALK PHOS U/L 58 61   AST (SGOT) U/L 81* 69*   ALT (SGPT) U/L 47* 51*     Results from last 7 days   Lab Units 07/21/22  0258 07/20/22 2159   CALCIUM mg/dL 8.8 9.3   ALBUMIN g/dL 4.30 4.70       Hemoglobin A1C   Date/Time Value Ref Range Status   07/20/2022 2159 5.70 (H) 4.80 - 5.60 % Final       No radiology results for the last day  Scheduled Medications  aspirin, 325 mg, Oral, Daily  atorvastatin, 40 mg, Oral, Daily  clopidogrel, 75 mg, Oral, Daily  diphenhydrAMINE, 25 mg, Intravenous, Q6H  famotidine, 20 mg, Intravenous, Q12H  lisinopril, 20 mg, Oral, Daily  methylPREDNISolone sodium succinate, 60 mg, Intravenous, Q8H  metoprolol tartrate, 12.5 mg, Oral, Q12H  nitroglycerin, 1 inch, Topical, Q6H    Infusions  heparin, 8.93 Units/kg/hr, Last Rate: 11.93 Units/kg/hr (07/21/22 0904)    Diet  NPO Diet NPO Type: Sips with Meds      Assessment/Plan     Active Hospital Problems    Diagnosis  POA   • **Chest pain [R07.9]  Yes   • Wasp sting-induced anaphylaxis [T63.461A]  Yes   • Leukocytosis [D72.829]  Yes   • Hyperglycemia, drug-induced [R73.9, T50.905A]  Yes   • HLD (hyperlipidemia) [E78.5]  Yes   • Elevated troponin  [R77.8]  Yes   • EVAN (obstructive sleep apnea) [G47.33]  Yes   • Obesity (BMI 30-39.9) [E66.9]  Yes   • Essential hypertension [I10]  Yes      Resolved Hospital Problems   No resolved problems to display.       47 y.o. male admitted with Chest pain and elevated Trop in setting of treatment for wasp sting/anaphylaxis.    Left pinna wasp sting leading to anaphylaxis.    Patient is status post Epi injection/IV Pepcid/IV SoluMedrol/IV Benadryl at outside ER with good results. Continue IV Solu-Medrol/IV Benadryl/IV Pepcid here. Should not need to continue at dc. Will need Epi-Pen at dc.    Chest pain/elevated troponin (demand ischemia vs non-ST elevation MI?).  Patient had a stress test on 5/30/2017 that revealed a normal ejection fraction with normal perfusion and no ischemia. Patient had a 2D echo on 7/1/2022 revealing a normal ejection fraction and normal diastolic dysfunction and no significant valvular disease. Continue heparin gtt/ASA/nitro paste/lisinopril/Plavix/metoprolol. Cardiology consult pending. Trop trending up 0.155>>0.447. EKGs pretty benign. Still symptomatic. Heart cath planned for later today per pt (await Card documentation).    Leukocytosis.  Suspect due to stress reaction as well as steroids. No s/sx of infection. Continue to monitor.    Hyperlipidemia.  Continue ASA. Started Lipitor. Reviewed lipid panel--trigs high and HDL low.     Hypertension.   Good control on lisinopril. Have added low-dose beta-blocker given his presentation.     Hyperglycemia.  A1c only 5.7. Suspect due to steroids. Continue to monitor.     Elevated liver function test.    Benign GI examination. Suspect due to fatty liver. Continue to monitor.       · Heparin gtt should suffice for DVT prophylaxis.  · Full code.  · Discussed with patient and family x 3.  · Anticipate discharge home with family, timing yet to be determined.      Carlin Scherer MD  [facility]  07/21/22  09:29 EDT        Electronically  signed by Carlin Scherer MD at 22 0951          Consult Notes (all)      Jonny Melton MD at 22 0727      Consult Orders    1. Inpatient Cardiology Consult [[id number]] ordered by Lazara Rodas APRN at 22 [phone]               [facility] Consult Note       Encounter Date:22  Patient:Heber Harley  :1975  MRN:[id number]    Date of Admission: 2022  Date of Encounter Visit: 22  Encounter Provider: Jonny Melton MD  Referring Provider: No Known Provider  Place of Service: [facility]  Patient Care Team:  Deya Pack APRN as PCP - General (Nurse Practitioner)      Consulted for: Chest pain    Chief Complaint:  Chest pain      History of Presenting Illness:   Subjective   Mr. Harley is a 47 y.o. gentleman who follows with Dr. Velasquez with a past medical history notable for tobacco abuse, obesity, gastric reflux disease, obstructive sleep apnea, hypertension, mixed upper lipidemia, and prior chest pain.  He presented to [facility] yesterday after experiencing acute onset chest discomfort at 8:30 in the morning.  This occurred after being stung by a wasp was actually treated for concern for anaphylaxis due to facial swelling with IV steroids and Benadryl which improved the swelling and issues with his allergic reaction but started having chest discomfort.  Initial troponins were elevated and actually have been rising since.  He has had continued chest pain overnight into this morning although doing better still elevated and present.  He did have recent stress testing which was normal.        Review of Systems:  Review of Systems   Constitutional: Negative.   HENT: Negative.    Eyes: Negative.    Cardiovascular: Positive for chest pain.   Respiratory: Negative.    Endocrine: Negative.    Hematologic/Lymphatic: Negative.    Skin: Negative.    Musculoskeletal: Negative.    Gastrointestinal: Negative.    Genitourinary:  Negative.    Neurological: Negative.    Psychiatric/Behavioral: Negative.    Allergic/Immunologic: Negative.        Medications:    Current Facility-Administered Medications:   •  acetaminophen (TYLENOL) tablet 650 mg, 650 mg, Oral, Q4H PRN **OR** acetaminophen (TYLENOL) 160 MG/5ML solution 650 mg, 650 mg, Oral, Q4H PRN **OR** acetaminophen (TYLENOL) suppository 650 mg, 650 mg, Rectal, Q4H PRN, Lazara Rodas APRN  •  aluminum-magnesium hydroxide-simethicone (MAALOX MAX) 400-400-40 MG/5ML suspension 15 mL, 15 mL, Oral, Q6H PRN, Lazara Rodas APRN  •  aspirin tablet 325 mg, 325 mg, Oral, Daily, Lazara Rodas APRN  •  atorvastatin (LIPITOR) tablet 40 mg, 40 mg, Oral, Daily, Rossy Jacobs MD, 40 mg at 07/21/22 0120  •  clopidogrel (PLAVIX) tablet 75 mg, 75 mg, Oral, Daily, Rossy Jacobs MD  •  diphenhydrAMINE (BENADRYL) injection 25 mg, 25 mg, Intravenous, Q6H, Rossy Jacobs MD, 25 mg at 07/21/22 0517  •  famotidine (PEPCID) injection 20 mg, 20 mg, Intravenous, Q12H, Rossy Jacobs MD, 20 mg at 07/21/22 0105  •  heparin 77195 units/250 mL (100 units/mL) in 0.45 % NaCl infusion, 8.93 Units/kg/hr, Intravenous, Titrated, Lazara Rodas APRN, Last Rate: 13.36 mL/hr at 07/21/22 0437, 11.93 Units/kg/hr at 07/21/22 0437  •  HYDROcodone-acetaminophen (NORCO) 7.5-325 MG per tablet 1 tablet, 1 tablet, Oral, Q4H PRN, Lazara Rodas APRN, 1 tablet at 07/21/22 0522  •  lisinopril (PRINIVIL,ZESTRIL) tablet 20 mg, 20 mg, Oral, Daily, Rossy Jacobs MD  •  methylPREDNISolone sodium succinate (SOLU-Medrol) injection 60 mg, 60 mg, Intravenous, Q8H, Rossy Jacobs MD, 60 mg at 07/21/22 0105  •  metoprolol tartrate (LOPRESSOR) tablet 12.5 mg, 12.5 mg, Oral, Q12H, Rossy Jacobs MD, 12.5 mg at 07/21/22 0000  •  nitroglycerin (NITROSTAT) ointment 1 inch, 1 inch, Topical, Q6H, Lazara Rodas, APRN, 1 inch at 07/20/22 2141  •  nitroglycerin (NITROSTAT) SL tablet 0.4 mg, 0.4 mg,  Sublingual, Q5 Min PRN, Rossy Jacobs MD, 0.4 mg at 22  •  ondansetron (ZOFRAN) tablet 4 mg, 4 mg, Oral, Q6H PRN **OR** ondansetron (ZOFRAN) injection 4 mg, 4 mg, Intravenous, Q6H PRN, Lazara Rodas APRN  •  sodium chloride 0.9 % flush 10 mL, 10 mL, Intravenous, PRN, Lazara Rodas APRN    Allergies   Allergen Reactions   • Wasp Venom Anaphylaxis       Past Medical History:   Diagnosis Date   • Chronic back pain     COMMON WEALTH PN MGMT [place], NARC RX   • GERD (gastroesophageal reflux disease)    • Hyperlipidemia    • Hypertension    • Hypertriglyceridemia    • Sleep apnea     On CPAP       Past Surgical History:   Procedure Laterality Date   • ANKLE SURGERY Right     ORIF   • HEMORRHOIDECTOMY N/A 11/3/2021    Procedure: HEMORRHOID BANDING;  Surgeon: Matthew Coronel MD;  Location: [facility];  Service: General;  Laterality: N/A;       Social History     Socioeconomic History   • Marital status:    Tobacco Use   • Smoking status: Former Smoker     Years: 5.00     Quit date:      Years since quittin.5   • Smokeless tobacco: Current User   • Tobacco comment: Caffeine - 2 daily   Vaping Use   • Vaping Use: Never used   Substance and Sexual Activity   • Alcohol use: Yes     Alcohol/week: 12.0 standard drinks     Types: 12 Cans of beer per week   • Drug use: Yes     Frequency: 7.0 times per week     Types: Marijuana     Comment: daily/PAIN MGMT RX NARC CH BACK PAIN   • Sexual activity: Defer       Family History   Problem Relation Age of Onset   • Hypertension Mother    • Coronary artery disease Mother         Mother with stent   • Heart disease Father         Father with MI and 3 vessel CABG in upper 50's   • Hypertension Father    • Diabetes Maternal Uncle    • Cancer Maternal Uncle    • Cancer Paternal Grandfather    • Malig Hyperthermia Neg Hx        The following portions of the patient's history were reviewed and updated as appropriate: allergies, current  medications, past family history, past medical history, past social history, past surgical history and problem list.        Objective:   Objective   Temp:  [98.2 °F (36.8 °C)-98.4 °F (36.9 °C)] 98.4 °F (36.9 °C)  Heart Rate:  [63-73] 73  Resp:  [17] 17  BP: (113-140)/(60-92) 113/60     Intake/Output Summary (Last 24 hours) at 7/21/2022 0727  Last data filed at 7/21/2022 0400  Gross per 24 hour   Intake --   Output 250 ml   Net -250 ml     Body mass index is 34.84 kg/m².      07/20/22 2149   Weight: 110 kg (242 lb 12.8 oz)           Physical Exam:  Constitutional: Well appearing, well developed, no acute distress   HENT: Oropharynx clear and membrane moist  Eyes: Normal conjunctiva, no sclera icterus.  Neck: Supple, no carotid bruit bilaterally.  Cardiovascular: Regular rate and rhythm, No Murmur, No bilateral lower extremity edema.  Pulmonary: Normal respiratory effort, normal lung sounds, no wheezing.  Abdominal: Soft, nontender, no hepatosplenomegaly, liver is non-pulsatile.  Neurological: Alert and orient x 3.   Skin: Warm, dry, no ecchymosis, no rash.  Psych: Appropriate mood and affect. Normal judgment and insight.         Lab Review:   Results from last 7 days   Lab Units 07/21/22 0258 07/20/22 2159   SODIUM mmol/L 139 140   POTASSIUM mmol/L 4.9 4.7   CHLORIDE mmol/L 101 103   CO2 mmol/L 23.5 20.5*   BUN mg/dL 18 15   CREATININE mg/dL 0.90 0.73*   GLUCOSE mg/dL 137* 112*   CALCIUM mg/dL 8.8 9.3   AST (SGOT) U/L 81* 69*   ALT (SGPT) U/L 47* 51*     Results from last 7 days   Lab Units 07/21/22  0545 07/21/22 0258 07/20/22 2159   TROPONIN T ng/mL 0.447* 0.301* 0.155*     Results from last 7 days   Lab Units 07/21/22  0258 07/20/22 2159   WBC 10*3/mm3 16.46* 14.02*   HEMOGLOBIN g/dL 15.4 16.2   HEMATOCRIT % 46.1 46.5   PLATELETS 10*3/mm3 291 282     Results from last 7 days   Lab Units 07/21/22  0545 07/21/22  0258 07/20/22  8409   INR   --   --  1.17*   APTT seconds 30.8 25.4 25.2         Results from last  7 days   Lab Units 07/21/22  0258   CHOLESTEROL mg/dL 119   TRIGLYCERIDES mg/dL 241*   HDL CHOL mg/dL 26*     The ASCVD Risk score (Soy BETTE Jr., et al., 2013) failed to calculate for the following reasons:    The valid total cholesterol range is 130 to 320 mg/dL             EKG on 7/21/22-    Baseline EKG on 7/20/22-    No acute ST changes noted on current ECG some subtle changes on prior baseline ECG in the inferior leads.    Previous Testing:    Echocardiogram on 7/1/22:  · Calculated left ventricular EF = 63% Calculated left ventricular 3D EF = 56% Estimated left ventricular EF was in agreement with the calculated left ventricular EF. Left ventricular systolic function is normal.  · Left ventricular diastolic function was normal.  · Sclerotic trileaflet aortic valve with mild calcification of the noncoronary cusp noted without any significant stenosis or regurgitation.  · Normal right ventricular size and function.    Stress Test on 5/30/17:  · Diaphragmatic attenuation and GI artifacts are present.  · Myocardial perfusion imaging indicates a normal myocardial perfusion study with no evidence of ischemia.  · Left ventricular ejection fraction is normal (Calculated EF = 67%).  · Impressions are consistent with a low risk study.            Assessment:   Assessment        Chest pain    Essential hypertension    HLD (hyperlipidemia)    Elevated troponin    EVAN (obstructive sleep apnea)    Obesity (BMI 30-39.9)    Wasp sting-induced anaphylaxis        Plan:   Plan    Mr. Harley is a 47 y.o. gentleman who follows with Dr. Velasquez with a past medical history notable for tobacco abuse, obesity, gastric reflux disease, obstructive sleep apnea, hypertension, and mixed hyperlipidemia presents to the hospital with chest pain and was found to have elevated troponin and ongoing chest pain concerning for high risk non-ST ovation myocardial infarction.  Given his ongoing chest discomfort that is obviously concerning for active  and continued coronary issues.  He is currently on a heparin infusion and Nitropaste.  We will proceed forward with cardiac catheterization urgently this morning to define his coronary anatomy.    Non-ST elevation myocardial infarction:  · Continue heparin and Nitropaste  · Will proceed forward urgently with cardiac catheterization this morning    Hypertension:  · Denies current medical therapy make adjustments with home regimen after cardiac catheterization        Thank you for allowing me to participate in the care of Heber Harley. Feel free to contact me directly with any further questions or concerns.    Jonny Melton MD  [facility]  07/21/22  07:27 EDT        Electronically signed by Jonny Melton MD at 07/21/22 8933

## 2022-07-22 NOTE — PROGRESS NOTES
Name: Heber Harley ADMIT: 2022   : 1975  PCP: Deya Pack APRN    MRN: [id number] LOS: 0 days   AGE/SEX: 47 y.o. male  ROOM: 6/1     Subjective   Subjective   No longer c/o chest pressure, SOA, and nausea. No palp, diaphoresis, vomiting.   Left ear feeling better and swelling in face much improved. No stridor, speech changes, dysphagia, hearing changes.    Review of Systems     as above    Objective   Objective   Vital Signs  Temp:  [97.6 °F (36.4 °C)-98.4 °F (36.9 °C)] 98 °F (36.7 °C)  Heart Rate:  [60-76] 73  Resp:  [14-20] 18  BP: (108-143)/(74-98) 143/92  SpO2:  [91 %-99 %] 95 %  on  Flow (L/min):  [3] 3;   Device (Oxygen Therapy): room air  Body mass index is 35.11 kg/m².  Physical Exam  Vitals and nursing note reviewed. Exam conducted with a chaperone present (Wife).   Constitutional:       General: He is not in acute distress.     Appearance: He is obese. He is not ill-appearing, toxic-appearing or diaphoretic.   HENT:      Head: Normocephalic and atraumatic.      Right Ear: External ear normal.      Left Ear: External ear normal.      Ears:      Comments: Left ear looks fine to me today     Nose: Nose normal.      Mouth/Throat:      Mouth: Mucous membranes are moist.      Pharynx: Oropharynx is clear.   Eyes:      General: No scleral icterus.        Right eye: No discharge.         Left eye: No discharge.      Extraocular Movements: Extraocular movements intact.      Conjunctiva/sclera: Conjunctivae normal.   Cardiovascular:      Rate and Rhythm: Normal rate and regular rhythm.      Pulses: Normal pulses.      Heart sounds: Normal heart sounds.   Pulmonary:      Effort: Pulmonary effort is normal. No respiratory distress.      Breath sounds: Normal breath sounds. No stridor. No wheezing or rales.   Chest:      Chest wall: No tenderness.   Abdominal:      General: Bowel sounds are normal. There is no distension.      Palpations: Abdomen is soft.      Tenderness: There is no  abdominal tenderness.   Musculoskeletal:         General: No swelling or deformity. Normal range of motion.      Cervical back: Neck supple. No rigidity.   Lymphadenopathy:      Cervical: No cervical adenopathy.   Skin:     General: Skin is warm and dry.      Capillary Refill: Capillary refill takes less than 2 seconds.      Coloration: Skin is not jaundiced.   Neurological:      General: No focal deficit present.      Mental Status: He is alert and oriented to person, place, and time. Mental status is at baseline.      Cranial Nerves: No cranial nerve deficit.      Coordination: Coordination normal.      Gait: Gait normal.   Psychiatric:         Mood and Affect: Mood normal.         Behavior: Behavior normal.         Thought Content: Thought content normal.         Judgment: Judgment normal.         Results Review     I reviewed the patient's new clinical results.  Results from last 7 days   Lab Units 07/22/22  0300 07/21/22 0258 07/20/22 2159   WBC 10*3/mm3 20.53* 16.46* 14.02*   HEMOGLOBIN g/dL 15.8 15.4 16.2   PLATELETS 10*3/mm3 306 291 282     Results from last 7 days   Lab Units 07/22/22  0300 07/21/22 0258 07/20/22  2159   SODIUM mmol/L 137 139 140   POTASSIUM mmol/L 4.5 4.9 4.7   CHLORIDE mmol/L 101 101 103   CO2 mmol/L 20.7* 23.5 20.5*   BUN mg/dL 25* 18 15   CREATININE mg/dL 0.96 0.90 0.73*   GLUCOSE mg/dL 143* 137* 112*   EGFR mL/min/1.73 98.1 106.0 112.9     Results from last 7 days   Lab Units 07/22/22  0300 07/21/22 0258 07/20/22 2159   ALBUMIN g/dL 4.50 4.30 4.70   BILIRUBIN mg/dL 0.6 0.7 0.7   ALK PHOS U/L 57 58 61   AST (SGOT) U/L 74* 81* 69*   ALT (SGPT) U/L 50* 47* 51*     Results from last 7 days   Lab Units 07/22/22  0300 07/21/22 0258 07/20/22 2159   CALCIUM mg/dL 9.0 8.8 9.3   ALBUMIN g/dL 4.50 4.30 4.70   MAGNESIUM mg/dL 2.4  --   --        Hemoglobin A1C   Date/Time Value Ref Range Status   07/20/2022 2159 5.70 (H) 4.80 - 5.60 % Final       No radiology results for the last  day  Scheduled Medications  aspirin, 81 mg, Oral, Daily  atorvastatin, 40 mg, Oral, Daily  cetirizine, 10 mg, Oral, BID  clopidogrel, 75 mg, Oral, Daily  famotidine, 20 mg, Oral, BID AC  lisinopril, 20 mg, Oral, Daily  methylPREDNISolone, 4 mg, Oral, After Lunch  methylPREDNISolone, 4 mg, Oral, After Dinner  [START ON 7/23/2022] methylPREDNISolone, 4 mg, Oral, TID Around Food  [START ON 7/24/2022] methylPREDNISolone, 4 mg, Oral, 4x Daily Taper  [START ON 7/25/2022] methylPREDNISolone, 4 mg, Oral, TID Around Food  [START ON 7/26/2022] methylPREDNISolone, 4 mg, Oral, Before Breakfast  [START ON 7/26/2022] methylPREDNISolone, 4 mg, Oral, Tonight  [START ON 7/27/2022] methylPREDNISolone, 4 mg, Oral, Before Breakfast  methylPREDNISolone, 8 mg, Oral, Before Breakfast  methylPREDNISolone, 8 mg, Oral, Tonight  [START ON 7/23/2022] methylPREDNISolone, 8 mg, Oral, Tonight  metoprolol tartrate, 12.5 mg, Oral, Q12H  nitroglycerin, 1 inch, Topical, Q6H    Infusions   Diet  Diet Regular; Cardiac       Assessment/Plan     Active Hospital Problems    Diagnosis  POA   • **Chest pain [R07.9]  Yes   • NSTEMI (non-ST elevated myocardial infarction) (HCC) [I21.4]  Yes   • Wasp sting-induced anaphylaxis [T63.461A]  Yes   • Leukocytosis [D72.829]  Yes   • Hyperglycemia, drug-induced [R73.9, T50.905A]  Yes   • HLD (hyperlipidemia) [E78.5]  Yes   • Elevated troponin [R77.8]  Yes   • EVAN (obstructive sleep apnea) [G47.33]  Yes   • Obesity (BMI 30-39.9) [E66.9]  Yes   • Essential hypertension [I10]  Yes      Resolved Hospital Problems   No resolved problems to display.       47 y.o. male admitted with Chest pain and elevated Trop in setting of treatment for wasp sting/anaphylaxis.    Left pinna wasp sting leading to anaphylaxis.    Patient is s/p Epi injection/IV Pepcid/IV SoluMedrol/IV Benadryl at outside ER with good results. Will change IV SoluMedrol to Medrol dose pack today in anticipation of dc. Change Benadryl to BID Zyrtec for the  next week. Continue PO Pepcid for the next week as well. Will need Epi-Pen at dc.    Chest pain/elevated troponin (demand ischemia vs non-ST elevation MI?).  Left heart cath 7/21 with SID to OM1. Continue Plavix/ASA/nitro paste/lisinopril/metoprolol/Lipitor. Cardiology consult appreciated. Trop trending up 0.155>>0.447>>>0.832. EKGs pretty benign. Echo this AM pending. No longer symptomatic. Maybe home later today after Card weighs in.    Leukocytosis.  Suspect due to stress reaction as well as steroids. No s/sx of infection. Continue to monitor as outpt through PCP office.    Hyperlipidemia.  Started Lipitor here. Reviewed lipid panel--trigs high and HDL low. Has only ever been on Simvastatin in past--did not tolerate due to muscle side effects. If doesn't tolerate Lipitor then consider changing to hydrophilic statin (Crestor).     Hypertension.   Good control on lisinopril. Have added low-dose beta-blocker given his presentation.     Hyperglycemia.  A1c only 5.7. Suspect due to steroids. Continue to monitor.     Elevated liver function test.    Benign GI examination. Suspect due to fatty liver. Will need to continue to monitor as outpt through PCP office or Card office.       · SCDs for DVT prophylaxis.  · Full code.  · Discussed with patient, wife, and RN.  · Anticipate discharge home with family later today.      Carlin Scherer MD  [facility]  07/22/22  11:22 EDT

## 2022-07-22 NOTE — CONSULTS
Met with patient and family, discussed benefits of cardiac rehab. Provided phase II information along with the contact information for cardiac rehab at [facility]. Requested for referral to be sent.

## 2022-07-22 NOTE — PROGRESS NOTES
"Patient Care Team:  Deya Pack APRN as PCP - General (Nurse Practitioner)  Lizeth Mir, JEM as Ambulatory  ([facility])    Chief Complaint: Follow-up non-ST elevation MI and PCI to the obtuse marginal branch.    Interval History:   Resting.  No chest pain.    Objective   Vital Signs  Temp:  [97.6 °F (36.4 °C)-98.4 °F (36.9 °C)] 98 °F (36.7 °C)  Heart Rate:  [60-76] 73  Resp:  [18] 18  BP: (119-143)/(82-98) 143/92    Intake/Output Summary (Last 24 hours) at 7/22/2022 1542  Last data filed at 7/22/2022 1231  Gross per 24 hour   Intake 720 ml   Output 500 ml   Net 220 ml     Flowsheet Rows    Flowsheet Row First Filed Value   Admission Height 177.8 cm (70\") Documented at 07/20/2022 2149   Admission Weight 110 kg (242 lb 12.8 oz) Documented at 07/20/2022 2149          Temp:  [97.6 °F (36.4 °C)-98.4 °F (36.9 °C)] 98 °F (36.7 °C)  Heart Rate:  [60-76] 73  Resp:  [18] 18  BP: (119-143)/(82-98) 143/92    Intake/Output Summary (Last 24 hours) at 7/22/2022 1542  Last data filed at 7/22/2022 1231  Gross per 24 hour   Intake 720 ml   Output 500 ml   Net 220 ml     Flowsheet Rows    Flowsheet Row First Filed Value   Admission Height 177.8 cm (70\") Documented at 07/20/2022 2149   Admission Weight 110 kg (242 lb 12.8 oz) Documented at 07/20/2022 2149          General Appearance:    Alert, cooperative, in no acute distress   Head:    Normocephalic, without obvious abnormality, atraumatic       Neck/Lymph   No adenopathy, supple, no thyromegaly, no carotid bruit, no    JVD   Lungs:     Clear to auscultation bilaterally, no wheezes, rales, or     rhonchi    Cardiac:    Normal rate, regular rhythm, no murmur, no rub, no gallop   Chest Wall:    No abnormalities observed   GI:     Normal bowel sounds, soft, nontender, nondistended,            no rebound tenderness   Extremities:   No cyanosis, clubbing, or edema   Circulatory/Peripheral Vascular :   Pulses palpable and equal bilaterally   Integumentary:   No " bleeding or rash. Normal temperature       Neurologic:   Cranial nerves 2 - 12 grossly intact, sensation intact              aspirin, 81 mg, Oral, Daily  atorvastatin, 40 mg, Oral, Daily  cetirizine, 10 mg, Oral, BID  clopidogrel, 75 mg, Oral, Daily  famotidine, 20 mg, Oral, BID AC  lisinopril, 20 mg, Oral, Daily  methylPREDNISolone, 4 mg, Oral, After Lunch  methylPREDNISolone, 4 mg, Oral, After Dinner  [START ON 7/23/2022] methylPREDNISolone, 4 mg, Oral, TID Around Food  [START ON 7/24/2022] methylPREDNISolone, 4 mg, Oral, 4x Daily Taper  [START ON 7/25/2022] methylPREDNISolone, 4 mg, Oral, TID Around Food  [START ON 7/26/2022] methylPREDNISolone, 4 mg, Oral, Before Breakfast  [START ON 7/26/2022] methylPREDNISolone, 4 mg, Oral, Tonight  [START ON 7/27/2022] methylPREDNISolone, 4 mg, Oral, Before Breakfast  methylPREDNISolone, 8 mg, Oral, Before Breakfast  methylPREDNISolone, 8 mg, Oral, Tonight  [START ON 7/23/2022] methylPREDNISolone, 8 mg, Oral, Tonight  metoprolol tartrate, 12.5 mg, Oral, Q12H  nitroglycerin, 1 inch, Topical, Q6H             Results Review:    Results from last 7 days   Lab Units 07/22/22  0300   SODIUM mmol/L 137   POTASSIUM mmol/L 4.5   CHLORIDE mmol/L 101   CO2 mmol/L 20.7*   BUN mg/dL 25*   CREATININE mg/dL 0.96   GLUCOSE mg/dL 143*   CALCIUM mg/dL 9.0     Results from last 7 days   Lab Units 07/22/22  0300 07/21/22  0545 07/21/22  0258   TROPONIN T ng/mL 0.832* 0.447* 0.301*     Results from last 7 days   Lab Units 07/22/22  0300   WBC 10*3/mm3 20.53*   HEMOGLOBIN g/dL 15.8   HEMATOCRIT % 45.2   PLATELETS 10*3/mm3 306     Results from last 7 days   Lab Units 07/22/22  0259 07/21/22  0545 07/21/22  0258 07/20/22  2159   INR   --   --   --  1.17*   APTT seconds 24.0 30.8 25.4 25.2     Results from last 7 days   Lab Units 07/22/22  0300   CHOLESTEROL mg/dL 130     Results from last 7 days   Lab Units 07/22/22  0300   MAGNESIUM mg/dL 2.4     Results from last 7 days   Lab Units 07/22/22  0300    CHOLESTEROL mg/dL 130   TRIGLYCERIDES mg/dL 276*   HDL CHOL mg/dL 23*   LDL CHOL mg/dL 63     @LABRCNT(bnp)@  I reviewed the patient's new clinical results.  I personally viewed and interpreted the patient's EKG/Telemetry data        · The following left ventricular wall segments are hypokinetic: basal anterolateral and mid anterolateral. New compared wtih prior echo  · Left ventricular ejection fraction appears to be 51 - 55%.  · Left ventricular diastolic function was normal.        Assessment & Plan   1.  Non-ST elevation MI  2.  Coronary artery disease chronic total occlusion mid LAD.  Acutely occluded obtuse marginal branch status post stenting.  3.  Essential hypertension  4.  Hyperlipidemia    -Doing very well today.  Continue aspirin and Plavix.  He will need to stay on that for at least 1 year.  -Continue atorvastatin at current dose.  LDL 63.  - Continue lisinopril 20 mg p.o. daily.  Blood pressure reasonably controlled.  Okay with going home today.  He will need to follow-up with me in 2 weeks.

## 2022-07-23 ENCOUNTER — READMISSION MANAGEMENT (OUTPATIENT)
Dept: CALL CENTER | Facility: HOSPITAL | Age: 47
End: 2022-07-23

## 2022-07-23 NOTE — OUTREACH NOTE
Prep Survey    Flowsheet Row Responses   [facility] facility patient discharged from? [place]   Is LACE score < 7 ? No   Emergency Room discharge w/ pulse ox? No   Eligibility Readm Mgmt   Discharge diagnosis Chest pain   Does the patient have one of the following disease processes/diagnoses(primary or secondary)? Acute MI (STEMI,NSTEMI)   Does the patient have Home health ordered? No   Is there a DME ordered? No   Medication alerts for this patient see AVS   Prep survey completed? Yes          SAGE JONES - Registered Nurse

## 2022-07-24 NOTE — PAYOR COMM NOTE
"Heber Harley (47 y.o. Male)     PLEASE SEE ATTACHED DC SUMMARY    REF#[id number]    THANK YOU    YONG EDWARDS LPN  CCP            Date of Birth   1975    Social Security Number       Address   [address] [place] [id number]    Home Phone   [phone]    MRN   [id number]       [facility]    Marital Status                               Admission Date   22    Admission Type   Urgent    Admitting Provider   Rossy Jacobs MD    Attending Provider       [address]        Discharge Date   2022    Discharge Disposition   Home or Self Care    Discharge Destination                               Attending Provider: (none)   Allergies: Wasp Venom    Isolation: None   Infection: None   Code Status: Prior   Advance Care Planning Activity    Ht: 177 cm (69.69\")   Wt: 110 kg (242 lb 8.1 oz)    Admission Cmt: None   Principal Problem: Chest pain [R07.9]                 Active Insurance as of 2022     Primary Coverage     Payor Plan Insurance Group Employer/Plan Group    [facility] [employer] [facility] [employer] [id number]     Payor Plan Address Payor Plan Phone Number Payor Plan Fax Number Effective Dates    [address] [phone]  2018 - None Entered    [place] [address]       Subscriber Name Subscriber Birth Date Member ID       BETTYE HARLEY 1975 [id number]                 Emergency Contacts      (Rel.) Home Phone Work Phone Mobile Phone    Bettye Harley (Spouse) -- -- [phone]               Discharge Summary      Carlin Scherer MD at 22 1538              Patient Name: Heber Harley  : 1975  MRN: [id number]    Date of Admission: 2022  Date of Discharge:  2022  Primary Care Physician: Deya Pack APRN      Chief Complaint:   No chief complaint on file.      Discharge Diagnoses     Active Hospital Problems    Diagnosis  POA   • **Chest " pain [R07.9]  Yes   • NSTEMI (non-ST elevated myocardial infarction) (HCC) [I21.4]  Yes   • Wasp sting-induced anaphylaxis [T63.461A]  Yes   • Leukocytosis [D72.829]  Yes   • Hyperglycemia, drug-induced [R73.9, T50.905A]  Yes   • HLD (hyperlipidemia) [E78.5]  Yes   • Elevated troponin [R77.8]  Yes   • EVAN (obstructive sleep apnea) [G47.33]  Yes   • Obesity (BMI 30-39.9) [E66.9]  Yes   • Essential hypertension [I10]  Yes      Resolved Hospital Problems   No resolved problems to display.        Hospital Course     Very pleasant 47 y.o. gentleman admitted with chest pain and elevated Trop in setting of treatment for wasp sting/anaphylaxis. Please see below for details of admission:     Left pinna wasp sting leading to anaphylaxis.    Patient is s/p Epi injection/IV Pepcid/IV SoluMedrol/IV Benadryl at outside ER with good results. Have changed IV SoluMedrol to Medrol dose pack in anticipation of dc. Changed Benadryl to BID Zyrtec for the next week. Continue PO Pepcid for the next week as well. Have rx'd Epi-Pen.     Chest pain/elevated troponin (demand ischemia vs non-ST elevation MI?).  Left heart cath 7/21 with SID to OM1. Continue Plavix/ASA/lisinopril/metoprolol/Lipitor. Cardiology consult appreciated. Trop trending up 0.155>>0.447>>>0.832. EKGs pretty benign. Echo this AM pending. No longer symptomatic. Home today per Card--their office will call him to set up f/u appt.  Have changed his PPI from omeprazole to Protonix now that he is on Plavix.     Leukocytosis.  Suspect due to stress reaction as well as steroids. No s/sx of infection. Continue to monitor as outpt through PCP office.     Hyperlipidemia.  Started Lipitor here. Reviewed lipid panel--trigs high and HDL low. Has only ever been on Simvastatin in past--did not tolerate due to muscle side effects. If doesn't tolerate Lipitor then consider changing to hydrophilic statin (Crestor).     Hypertension.   Good control on lisinopril. Continue new low-dose  beta-blocker given his CAD.      Hyperglycemia.  A1c only 5.7. Suspect due to steroids. F/u with PCP.     Elevated liver function test.    Benign GI examination. Suspect due to fatty liver. Will need to continue to monitor as outpt through PCP office or Card office.         · SCDs for DVT prophylaxis while here.  · Full code confirmed.  · Discussed with patient, wife, and RN.  · Discharge home with family today.       Day of Discharge     Subjective:  No longer c/o chest pressure, SOA, and nausea. No palp, diaphoresis, vomiting.   Left ear feeling better and swelling in face much improved. No stridor, speech changes, dysphagia, hearing changes.    Physical Exam:  Temp:  [97.6 °F (36.4 °C)-98.4 °F (36.9 °C)] 98 °F (36.7 °C)  Heart Rate:  [60-76] 73  Resp:  [18] 18  BP: (119-143)/(82-98) 143/92  Body mass index is 35.11 kg/m².  Physical Exam  Vitals and nursing note reviewed. Exam conducted with a chaperone present (Wife).   Constitutional:       General: He is not in acute distress.     Appearance: He is obese. He is not ill-appearing, toxic-appearing or diaphoretic.   HENT:      Head: Normocephalic and atraumatic.      Right Ear: External ear normal.      Left Ear: External ear normal.      Ears:      Comments: Left ear looks fine to me today     Nose: Nose normal.      Mouth/Throat:      Mouth: Mucous membranes are moist.      Pharynx: Oropharynx is clear.   Cardiovascular:      Rate and Rhythm: Normal rate and regular rhythm.      Pulses: Normal pulses.      Heart sounds: Normal heart sounds.   Pulmonary:      Effort: Pulmonary effort is normal. No respiratory distress.      Breath sounds: Normal breath sounds. No stridor. No wheezing or rales.   Chest:      Chest wall: No tenderness.   Abdominal:      General: Bowel sounds are normal. There is no distension.      Palpations: Abdomen is soft.      Tenderness: There is no abdominal tenderness.   Musculoskeletal:         General: No swelling or deformity. Normal  range of motion.    Skin:     General: Skin is warm and dry.   Neurological:      General: No focal deficit present.           Consultants     Consult Orders (all) (From admission, onward)     Start     Ordered    07/20/22 2110  Inpatient Cardiology Consult  Once        Specialty:  Cardiology  Provider:  Kevin Velasquez MD    07/20/22 2109 07/20/22 2105  Inpatient Cardiology Consult  Once,   Status:  Canceled        Specialty:  Cardiology  Provider:  Kevin Velasquez MD    07/20/22 2104              Procedures     Coronary Angiography, Left Heart Cath, Stent SID coronary, Left ventriculography      Imaging Results (All)     None          Results for orders placed during the hospital encounter of 07/20/22    Adult Transthoracic Echo Complete W/ Cont if Necessary Per Protocol    Interpretation Summary  · The following left ventricular wall segments are hypokinetic: basal anterolateral and mid anterolateral. New compared wtih prior echo  · Left ventricular ejection fraction appears to be 51 - 55%.  · Left ventricular diastolic function was normal.    Pertinent Labs     Results from last 7 days   Lab Units 07/22/22  0300 07/21/22 0258 07/20/22  2159   WBC 10*3/mm3 20.53* 16.46* 14.02*   HEMOGLOBIN g/dL 15.8 15.4 16.2   PLATELETS 10*3/mm3 306 291 282     Results from last 7 days   Lab Units 07/22/22 0300 07/21/22 0258 07/20/22  2159   SODIUM mmol/L 137 139 140   POTASSIUM mmol/L 4.5 4.9 4.7   CHLORIDE mmol/L 101 101 103   CO2 mmol/L 20.7* 23.5 20.5*   BUN mg/dL 25* 18 15   CREATININE mg/dL 0.96 0.90 0.73*   GLUCOSE mg/dL 143* 137* 112*   EGFR mL/min/1.73 98.1 106.0 112.9     Results from last 7 days   Lab Units 07/22/22  0300 07/21/22 0258 07/20/22  2159   ALBUMIN g/dL 4.50 4.30 4.70   BILIRUBIN mg/dL 0.6 0.7 0.7   ALK PHOS U/L 57 58 61   AST (SGOT) U/L 74* 81* 69*   ALT (SGPT) U/L 50* 47* 51*     Results from last 7 days   Lab Units 07/22/22  0300 07/21/22 0258 07/20/22  2159   CALCIUM mg/dL 9.0 8.8  9.3   ALBUMIN g/dL 4.50 4.30 4.70   MAGNESIUM mg/dL 2.4  --   --        Results from last 7 days   Lab Units 07/22/22  0300 07/21/22  0545 07/21/22  0258 07/20/22  2159   TROPONIN T ng/mL 0.832* 0.447* 0.301* 0.155*       Results from last 7 days   Lab Units 07/22/22  0300   CHOLESTEROL mg/dL 130   TRIGLYCERIDES mg/dL 276*   HDL CHOL mg/dL 23*   LDL CHOL mg/dL 63         Results from last 7 days   Lab Units 07/20/22  2209   COVID19  Not Detected       Test Results Pending at Discharge       Discharge Details        Discharge Medications      New Medications      Instructions Start Date   aspirin 81 MG EC tablet  Replaces: aspirin 325 MG tablet   81 mg, Oral, Daily   Start Date: July 23, 2022     atorvastatin 40 MG tablet  Commonly known as: LIPITOR   40 mg, Oral, Daily   Start Date: July 23, 2022     cetirizine 10 MG tablet  Commonly known as: zyrTEC   10 mg, Oral, 2 Times Daily      clopidogrel 75 MG tablet  Commonly known as: PLAVIX   75 mg, Oral, Daily   Start Date: July 23, 2022     EPINEPHrine 0.3 MG/0.3ML solution auto-injector injection  Commonly known as: EpiPen 2-Neo   0.3 mg, Intramuscular, Once      famotidine 20 MG tablet  Commonly known as: PEPCID   20 mg, Oral, 2 Times Daily Before Meals      methylPREDNISolone 4 MG dose pack  Commonly known as: MEDROL   Take as directed on package instructions.      methylPREDNISolone 4 MG dose pack  Commonly known as: MEDROL   Take as directed on package instructions.      methylPREDNISolone 4 MG dose pack  Commonly known as: MEDROL   Take as directed on package instructions.      methylPREDNISolone 4 MG dose pack  Commonly known as: MEDROL   Take as directed on package instructions.      methylPREDNISolone 4 MG dose pack  Commonly known as: MEDROL   Take as directed on package instructions.   Start Date: July 23, 2022     methylPREDNISolone 4 MG dose pack  Commonly known as: MEDROL   Take as directed on package instructions.   Start Date: July 23, 2022      methylPREDNISolone 4 MG dose pack  Commonly known as: MEDROL   Take as directed on package instructions.   Start Date: July 24, 2022     methylPREDNISolone 4 MG dose pack  Commonly known as: MEDROL   Take as directed on package instructions.   Start Date: July 25, 2022     methylPREDNISolone 4 MG dose pack  Commonly known as: MEDROL   Take as directed on package instructions.   Start Date: July 26, 2022     methylPREDNISolone 4 MG dose pack  Commonly known as: MEDROL   Take as directed on package instructions.   Start Date: July 26, 2022     methylPREDNISolone 4 MG dose pack  Commonly known as: MEDROL   Take as directed on package instructions.   Start Date: July 27, 2022     metoprolol tartrate 25 MG tablet  Commonly known as: LOPRESSOR   12.5 mg, Oral, Every 12 Hours Scheduled      pantoprazole 40 MG EC tablet  Commonly known as: Protonix   40 mg, Oral, Daily         Changes to Medications      Instructions Start Date   HYDROcodone-acetaminophen 7.5-325 MG per tablet  Commonly known as: NORCO  What changed: Another medication with the same name was removed. Continue taking this medication, and follow the directions you see here.   TAKE 1 TABLET 3 TIMES DAILY AS NEEDED FOR PAIN.*MAY TAKE EXTRA IF NEEDED*      lisinopril 20 MG tablet  Commonly known as: PRINIVIL,ZESTRIL  What changed:   · medication strength  · how much to take   20 mg, Oral, Daily   Start Date: July 23, 2022     nitroglycerin 0.4 MG SL tablet  Commonly known as: NITROSTAT  What changed: reasons to take this   0.4 mg, Sublingual, Every 5 Minutes PRN, Take no more than 3 doses in 15 minutes.         Continue These Medications      Instructions Start Date   Caltrate 600+D3 600-800 MG-UNIT tablet  Generic drug: calcium carb-cholecalciferol   take 1 tablet by mouth twice daily      fish oil 1000 MG capsule capsule   2,000 mg, Oral, 2 Times Daily With Meals, INST PER ANESTHESIA PROTOCOL      sildenafil 20 MG tablet  Commonly known as: REVATIO   Take  1-2 tablets by mouth 1 hour prior to intercourse      terbinafine 250 MG tablet  Commonly known as: lamiSIL   250 mg, Oral, Daily      Testosterone 30 MG/ACT solution   1 PUMP ON EACH AXILLA DAILY       Trintellix 10 MG tablet  Generic drug: Vortioxetine HBr   TAKE 1 TABLET BY MOUTH EVERY DAY      vitamin D 1.25 MG (26810 UT) capsule capsule  Commonly known as: ERGOCALCIFEROL   50,000 Units, Oral, Weekly         Stop These Medications    aspirin 325 MG tablet  Replaced by: aspirin 81 MG EC tablet     diclofenac 75 MG EC tablet  Commonly known as: VOLTAREN     doxycycline 100 MG capsule  Commonly known as: VIBRAMYCIN     Nexlizet 180-10 MG tablet  Generic drug: Bempedoic Acid-Ezetimibe     omeprazole 20 MG capsule  Commonly known as: priLOSEC     predniSONE 10 MG tablet  Commonly known as: DELTASONE     Testosterone Cypionate 200 MG/ML injection  Commonly known as: DEPOTESTOTERONE CYPIONATE            Allergies   Allergen Reactions   • Wasp Venom Anaphylaxis       Discharge Disposition:  Home or Self Care      Discharge Diet:  Diet Order   Procedures   • Diet Regular; Cardiac       Discharge Activity:   as tolerated    CODE STATUS:    Code Status and Medical Interventions:   Ordered at: 07/20/22 2106     Code Status (Patient has no pulse and is not breathing):    CPR (Attempt to Resuscitate)     Medical Interventions (Patient has pulse or is breathing):    Full Support       Future Appointments   Date Time Provider Department Center   7/3/2023  3:40 PM Kevin Velasquez MD [facility]     Additional Instructions for the Follow-ups that You Need to Schedule     Ambulatory Referral to Cardiac Rehab   As directed      Discharge Follow-up with PCP   As directed       Currently Documented PCP:    Deya Pack APRN    PCP Phone Number:    [phone]     Follow Up Details: Ramone NP (PCP) in 1 week         Discharge Follow-up with Specified Provider: [facility]; 2 Weeks   As directed      To:  [facility]    Follow Up: 2 Weeks            Follow-up Information     [facility] .    Specialty: Cardiac Rehabilitation  Contact information:  [address] [facility] [address] [phone]           Deya Pack APRN .    Specialty: Nurse Practitioner  Why: Ramone BENTON (PCP) in 1 week  Contact information:  [address]  [facility] [address]  [phone]                         Additional Instructions for the Follow-ups that You Need to Schedule     Ambulatory Referral to Cardiac Rehab   As directed      Discharge Follow-up with PCP   As directed       Currently Documented PCP:    Deya Pack APRN    PCP Phone Number:    [phone]     Follow Up Details: Ramone BENTON (PCP) in 1 week         Discharge Follow-up with Specified Provider: [facility]; 2 Weeks   As directed      To: [facility]    Follow Up: 2 Weeks           Time Spent on Discharge:  Greater than 30 minutes      Carlin Scherer MD  [facility]  07/22/22  15:38 EDT                Electronically signed by Carlin Scherer MD at 07/22/22 6332

## 2022-07-25 NOTE — CASE MANAGEMENT/SOCIAL WORK
Case Management Discharge Note      Final Note: DC'd home 7/22              Transportation Services  Private: Car    Final Discharge Disposition Code: 01 - home or self-care

## 2022-07-27 ENCOUNTER — READMISSION MANAGEMENT (OUTPATIENT)
Dept: CALL CENTER | Facility: HOSPITAL | Age: 47
End: 2022-07-27

## 2022-07-27 NOTE — OUTREACH NOTE
AMI Week 1 Survey    Flowsheet Row Responses   [facility] patient discharged from? [place]   Does the patient have one of the following disease processes/diagnoses(primary or secondary)? Acute MI (STEMI,NSTEMI)   Week 1 attempt successful? Yes   Call start time 1450   Call end time 1457   General alerts for this patient wife is RN   Discharge diagnosis Chest pain   Is patient permission given to speak with other caregiver? Yes   List who call center can speak with wife   Meds reviewed with patient/caregiver? Yes   Is the patient having any side effects they believe may be caused by any medication additions or changes? No   Does the patient have all prescriptions related to this admission filled (includes statins,anticoagulants,HTN meds,anti-arrhythmia meds) Yes   Is the patient taking all medications as directed (includes completed medication regime)? Yes   Does the patient have a primary care provider?  Yes   Does the patient have an appointment with their PCP,cardiologist,or clinic within 7 days of discharge? Yes   Comments regarding PCP f/u 7-28 PCP   Has the patient kept scheduled appointments due by today? N/A   Psychosocial issues? No   Comments [facility] site right wrist, bruising is fading now. Denies CP/SOA. BP this am was 99/86, felt dizzy, sat down and felt better. Gluc was low as well, he has stopped sugar/carbs. Ate some bread/p.butter and he felt better.    Did the patient receive a copy of their discharge instructions? Yes   Nursing interventions Reviewed instructions with patient   What is the patient's perception of their health status since discharge? Improving   Nursing interventions Nurse provided patient education   Is the patient/caregiver able to teach back signs and symptoms of when to call for help immediately: Sudden chest discomfort, Sudden discomfort in arms, back, neck or jaw, Sudden sweating or clammy skin   Is the patient/caregiver able to teach back lifestyle changes to help  prevent MIs Regular exercise as approved by provider, Quit smoking   Is the patient/caregiver able to teach back ways to prevent a second heart attack: Take medications, Follow up with MD   If the patient is a current smoker, are they able to teach back resources for cessation? --  [dips since age of 16, 1can every two days.]   Is the patient/caregiver able to teach back the hierarchy of who to call/visit for symptoms/problems? PCP, Specialist, Home health nurse, Urgent Care, ED, 911 Yes   Week 1 call completed? Yes          JULIA JONES - Registered Nurse

## 2022-08-02 ENCOUNTER — TRANSCRIBE ORDERS (OUTPATIENT)
Dept: ADMINISTRATIVE | Facility: HOSPITAL | Age: 47
End: 2022-08-02

## 2022-08-02 DIAGNOSIS — R74.8 ELEVATED LIVER ENZYMES: Primary | ICD-10-CM

## 2022-08-03 ENCOUNTER — OFFICE VISIT (OUTPATIENT)
Dept: CARDIOLOGY | Facility: CLINIC | Age: 47
End: 2022-08-03

## 2022-08-03 VITALS
SYSTOLIC BLOOD PRESSURE: 120 MMHG | DIASTOLIC BLOOD PRESSURE: 64 MMHG | BODY MASS INDEX: 35.1 KG/M2 | HEIGHT: 69 IN | WEIGHT: 237 LBS | HEART RATE: 61 BPM

## 2022-08-03 DIAGNOSIS — E78.5 HYPERLIPIDEMIA, UNSPECIFIED HYPERLIPIDEMIA TYPE: ICD-10-CM

## 2022-08-03 DIAGNOSIS — I21.4 NSTEMI (NON-ST ELEVATED MYOCARDIAL INFARCTION): ICD-10-CM

## 2022-08-03 DIAGNOSIS — I10 ESSENTIAL HYPERTENSION: ICD-10-CM

## 2022-08-03 DIAGNOSIS — I25.10 CORONARY ARTERY DISEASE INVOLVING NATIVE CORONARY ARTERY OF NATIVE HEART WITHOUT ANGINA PECTORIS: Primary | ICD-10-CM

## 2022-08-03 PROCEDURE — 93000 ELECTROCARDIOGRAM COMPLETE: CPT | Performed by: NURSE PRACTITIONER

## 2022-08-03 PROCEDURE — 99214 OFFICE O/P EST MOD 30 MIN: CPT | Performed by: NURSE PRACTITIONER

## 2022-08-03 NOTE — PROGRESS NOTES
Date of Office Visit: 2022  Encounter Provider: TAMELA Romero  Place of Service: [facility] CARDIOLOGY  Patient Name: Heber Harley  :1975    Chief Complaint   Patient presents with   • Coronary Artery Disease   :     HPI: Heber Harley is a 47 y.o. male.  He is a patient formally followed by Dr. Adames.  He has hypertension, hypertriglyceridemia, and a family history of coronary disease.  In , he underwent stress testing which was negative for ischemia.   On , he was seen in the office by Dr. Velasquez for evaluation of occasional chest pain.  His EKG was unchanged.  His blood pressure was poorly controlled.  Dr. Velasquez felt we should control his blood pressure first and proceed with a stress test if his symptoms continue despite a normal blood pressure.  On , he presented to [facility] with acute onset of chest discomfort.  Initial troponins were elevated, and he was transferred to [facility] for further care.  Cardiac catheterization demonstrated a 100% proximal OM1 occlusion for which he underwent angioplasty and drug-eluting stent placement.  He had normal LV function.  Medical management was recommended initially.  However, if he continued having symptoms, he may need bypass surgery.  He is here today for follow-up.   He feels great.  He denies any chest pain, shortness of breath, palpitations, edema, dizziness, or syncope.  He denies any bleeding difficulties or melena.  [place] difficulty.      Past Medical History:   Diagnosis Date   • Chronic back pain     COMMON WEALTH PN MGMT [place], NARC RX   • GERD (gastroesophageal reflux disease)    • Hyperlipidemia    • Hypertension    • Hypertriglyceridemia    • Sleep apnea     On CPAP       Past Surgical History:   Procedure Laterality Date   • ANKLE SURGERY Right     ORIF   • CARDIAC CATHETERIZATION Left 2022     Procedure: Coronary Angiography;  Surgeon: Mayito Levy MD;  Location:  [facility] CATH INVASIVE LOCATION;  Service: Cardiovascular;  Laterality: Left;   • CARDIAC CATHETERIZATION N/A 2022    Procedure: Left Heart Cath;  Surgeon: Mayito Levy MD;  Location:  [facility] CATH INVASIVE LOCATION;  Service: Cardiovascular;  Laterality: N/A;   • CARDIAC CATHETERIZATION N/A 2022    Procedure: Stent SID coronary;  Surgeon: Mayito Levy MD;  Location:  [facility] CATH INVASIVE LOCATION;  Service: Cardiovascular;  Laterality: N/A;   • CARDIAC CATHETERIZATION N/A 2022    Procedure: Left ventriculography;  Surgeon: Mayito Levy MD;  Location:  [facility] CATH INVASIVE LOCATION;  Service: Cardiovascular;  Laterality: N/A;   • HEMORRHOIDECTOMY N/A 11/3/2021    Procedure: HEMORRHOID BANDING;  Surgeon: Matthew Coronel MD;  Location: [facility] [place];  Service: General;  Laterality: N/A;       Social History     Socioeconomic History   • Marital status:    Tobacco Use   • Smoking status: Former Smoker     Years: 5.00     Quit date:      Years since quittin.5   • Smokeless tobacco: Current User   • Tobacco comment: Caffeine - 2 daily   Vaping Use   • Vaping Use: Never used   Substance and Sexual Activity   • Alcohol use: Yes     Alcohol/week: 12.0 standard drinks     Types: 12 Cans of beer per week   • Drug use: Yes     Frequency: 7.0 times per week     Types: Marijuana     Comment: daily/PAIN MGMT RX NARC CH BACK PAIN   • Sexual activity: Defer       Family History   Problem Relation Age of Onset   • Hypertension Mother    • Coronary artery disease Mother         Mother with stent   • Heart disease Father         Father with MI and 3 vessel CABG in upper 50's   • Hypertension Father    • Diabetes Maternal Uncle    • Cancer Maternal Uncle    • Cancer Paternal Grandfather    • Malig Hyperthermia Neg Hx        Review of Systems   Constitutional: Negative.   Cardiovascular: Negative.  Negative for chest pain,  dyspnea on exertion, leg swelling, orthopnea, paroxysmal nocturnal dyspnea and syncope.   Respiratory: Negative.    Hematologic/Lymphatic: Negative for bleeding problem.   Musculoskeletal: Negative for falls.   Gastrointestinal: Negative for melena.   Neurological: Negative for dizziness and light-headedness.       Allergies   Allergen Reactions   • Wasp Venom Anaphylaxis         Current Outpatient Medications:   •  aspirin 81 MG EC tablet, Take 1 tablet by mouth Daily., Disp: 30 tablet, Rfl: 0  •  atorvastatin (LIPITOR) 40 MG tablet, Take 1 tablet by mouth Daily., Disp: 90 tablet, Rfl: 0  •  Calcium Carb-Cholecalciferol (Calcium + D3) 600-800 MG-UNIT tablet, take 1 tablet by mouth twice daily, Disp: 180 tablet, Rfl: 3  •  clopidogrel (PLAVIX) 75 MG tablet, Take 1 tablet by mouth Daily., Disp: 30 tablet, Rfl: 0  •  famotidine (PEPCID) 20 MG tablet, Take 1 tablet by mouth 2 (Two) Times a Day Before Meals., Disp: 14 tablet, Rfl: 0  •  HYDROcodone Bitartrate ER 20 MG tablet extended-release 24 hour , Take 1 tablet by mouth Daily., Disp: 30 tablet, Rfl: 0  •  HYDROcodone-acetaminophen (NORCO) 7.5-325 MG per tablet, TAKE 1 TABLET 3 TIMES DAILY AS NEEDED FOR PAIN.*MAY TAKE EXTRA IF NEEDED*, Disp: 100 tablet, Rfl: 0  •  lisinopril (PRINIVIL,ZESTRIL) 20 MG tablet, Take 1 tablet by mouth Daily., Disp: 30 tablet, Rfl: 0  •  metoprolol tartrate (LOPRESSOR) 25 MG tablet, Take 0.5 tablets by mouth Every 12 (Twelve) Hours., Disp: 30 tablet, Rfl: 0  •  nitroglycerin (NITROSTAT) 0.4 MG SL tablet, Place 1 tablet under the tongue Every 5 (Five) Minutes As Needed for Chest Pain (Only if SBP Greater Than 100). Take no more than 3 doses in 15 minutes., Disp: 25 tablet, Rfl: 0  •  Omega-3 Fatty Acids (FISH OIL) 1000 MG capsule capsule, Take 2,000 mg by mouth 2 (Two) Times a Day With Meals. INST PER ANESTHESIA PROTOCOL, Disp: , Rfl:   •  pantoprazole (Protonix) 40 MG EC tablet, Take 1 tablet by mouth Daily., Disp: 30 tablet, Rfl: 0  •   "sildenafil (REVATIO) 20 MG tablet, Take 1-2 tablets by mouth 1 hour prior to intercourse, Disp: 30 tablet, Rfl: 2  •  terbinafine (lamiSIL) 250 MG tablet, Take 1 tablet by mouth Daily., Disp: 90 tablet, Rfl: 0  •  Testosterone 30 MG/ACT solution, 1 PUMP ON EACH AXILLA DAILY, Disp: 90 mL, Rfl: 5  •  vitamin D (ERGOCALCIFEROL) 1.25 MG (15796 UT) capsule capsule, Take 1 capsule by mouth 1 (One) Time Per Week., Disp: 12 capsule, Rfl: 3  •  Vortioxetine HBr (Trintellix) 10 MG tablet, TAKE 1 TABLET BY MOUTH EVERY DAY, Disp: 90 tablet, Rfl: 1      Objective:     Vitals:    08/03/22 1412   BP: 120/64   Pulse: 61   Weight: 108 kg (237 lb)   Height: 175.3 cm (69\")     Body mass index is 35 kg/m².    PHYSICAL EXAM:    Neck:      Vascular: No JVD.   Pulmonary:      Effort: Pulmonary effort is normal.      Breath sounds: Normal breath sounds.   Cardiovascular:      Normal rate. Regular rhythm.      Murmurs: There is no murmur.      No gallop. No click. No rub.   Pulses:     Intact distal pulses.   Skin:               ECG 12 Lead    Date/Time: 8/3/2022 2:16 PM  Performed by: Loni Sawyer APRN  Authorized by: Loni Sawyer APRN   Comparison: compared with previous ECG from 7/22/2022  Similar to previous ECG  Rhythm: sinus rhythm  Rate: normal  BPM: 61  T inversion: I and aVL                Assessment:       Diagnosis Plan   1. Coronary artery disease involving native coronary artery of native heart without angina pectoris  ECG 12 Lead    Ambulatory Referral to Cardiac Rehab   2. Essential hypertension     3. Hyperlipidemia, unspecified hyperlipidemia type     4. NSTEMI (non-ST elevated myocardial infarction) (HCC)       Orders Placed This Encounter   Procedures   • Ambulatory Referral to Cardiac Rehab     Referral Priority:   Routine     Referral Type:   Rehabilitation - Outpatient     Number of Visits Requested:   1   • ECG 12 Lead     This order was created via procedure documentation     Order Specific Question:  "  Release to patient     Answer:   Immediate          Plan:       1.  Coronary artery disease.  Status post angioplasty and drug-eluting stenting of an acutely occluded OM1.  He is on dual antiplatelet therapy with aspirin and Plavix along with high intensity statin therapy and metoprolol.  I have placed a new order for cardiac rehab at [place].      2.  Hypertension.  His blood pressure looks great.  Continue lisinopril.      3.  Lipid panel from the hospital demonstrated an LDL of 63 and an HDL of 23.  He was started on atorvastatin 40 mg.  He will need a repeat lipid panel and hepatic function panel in 3 months.      He is doing well.  I am not recommending any changes today.  He will follow-up with Dr. Velasquez in 1 month.      As always, it has been a pleasure to participate in your patient's care.      Sincerely,         TAMELA Metz

## 2022-08-04 ENCOUNTER — READMISSION MANAGEMENT (OUTPATIENT)
Dept: CALL CENTER | Facility: HOSPITAL | Age: 47
End: 2022-08-04

## 2022-08-04 NOTE — OUTREACH NOTE
AMI Week 2 Survey    Flowsheet Row Responses   [facility] patient discharged from? [place]   Does the patient have one of the following disease processes/diagnoses(primary or secondary)? Acute MI (STEMI,NSTEMI)   Week 2 attempt successful? Yes   Call start time 0926   Call end time 0928   General alerts for this patient wife is RN   Discharge diagnosis Chest pain   Person spoke with today (if not patient) and relationship Wife   Meds reviewed with patient/caregiver? Yes   Is the patient taking all medications as directed (includes completed medication regime)? Yes   Does the patient have a primary care provider?  Yes   Has the patient kept scheduled appointments due by today? Yes   Has home health visited the patient within 72 hours of discharge? N/A   Psychosocial issues? No   Did the patient receive a copy of their discharge instructions? Yes   Nursing interventions Reviewed instructions with patient   What is the patient's perception of their health status since discharge? Improving   Nursing interventions Nurse provided patient education   Is the patient/caregiver able to teach back signs and symptoms of when to call for help immediately: --  [Wife is RN and states she is aware of s/s.]   Is the patient/caregiver able to teach back ways to prevent a second heart attack: Follow up with MD, Take medications   Is the patient/caregiver able to teach back the hierarchy of who to call/visit for symptoms/problems? PCP, Specialist, Home health nurse, Urgent Care, ED, 911 Yes   Week 2 call completed? Yes   Revoked No further contact(revokes)-requires comment   Is the patient interested in additional calls from an ambulatory ?  NOTE:  applies to high risk patients requiring additional follow-up. No   Graduated/Revoked comments Wife reports Pt is doing better than he has in years.           RENETTA FAN - Registered Nurse

## 2022-08-25 ENCOUNTER — HOSPITAL ENCOUNTER (OUTPATIENT)
Dept: ULTRASOUND IMAGING | Facility: HOSPITAL | Age: 47
Discharge: HOME | End: 2022-08-25
Admitting: NURSE PRACTITIONER

## 2022-08-25 DIAGNOSIS — R74.8 ELEVATED LIVER ENZYMES: ICD-10-CM

## 2022-08-25 PROCEDURE — 76705 ECHO EXAM OF ABDOMEN: CPT

## 2022-09-15 NOTE — PROGRESS NOTES
Heber Batistaris  1975  Date of Office Visit: 09/19/22  Encounter Provider: Kevin Velasquez MD  Place of Service: [facility] CARDIOLOGY    CHIEF COMPLAINT:  1.  History of non-ST elevation MI  2.  Coronary artery disease chronic total occlusion mid LAD.  Acutely occluded obtuse marginal branch status post stenting.  3.  Essential hypertension  4.  Hyperlipidemia    HISTORY OF PRESENT ILLNESS:  47-year-old with a medical history of recent anaphylactic reaction to wasp, chest discomfort and elevated troponin consistent with non-ST elevation MI.  The patient underwent coronary angiography with Dr. Levy as documented below showing an acutely occluded obtuse marginal branch.  He did very well following that.  His ejection fraction was preserved.  He was discharged home and has not had chest pain since then.  He does state that his blood pressure has been lower on his lisinopril and metoprolol dosing.  He has not had any lightheadedness or syncope.    Review of Systems   Constitutional: Negative for fever and malaise/fatigue.   HENT: Negative for nosebleeds and sore throat.    Eyes: Negative for blurred vision and double vision.   Cardiovascular: Negative for chest pain, claudication, palpitations and syncope.   Respiratory: Negative for cough, shortness of breath and snoring.    Endocrine: Negative for cold intolerance, heat intolerance and polydipsia.   Skin: Negative for itching, poor wound healing and rash.   Musculoskeletal: Negative for joint pain, joint swelling, muscle weakness and myalgias.   Gastrointestinal: Negative for abdominal pain, melena, nausea and vomiting.   Neurological: Negative for light-headedness, loss of balance, seizures, vertigo and weakness.   Psychiatric/Behavioral: Negative for altered mental status and depression.          Past Medical History:   Diagnosis Date   • Chronic back pain     COMMON WEALTH PN MGMT [place], NARC RX   • GERD  (gastroesophageal reflux disease)    • Hyperlipidemia    • Hypertension    • Hypertriglyceridemia    • Sleep apnea     On CPAP       The following portions of the patient's history were reviewed and updated as appropriate: Social history , Family history and Surgical history     Current Outpatient Medications on File Prior to Visit   Medication Sig Dispense Refill   • aspirin 81 MG EC tablet Take 1 tablet by mouth Daily. 30 tablet 0   • atorvastatin (LIPITOR) 40 MG tablet Take 1 tablet by mouth at bedtime 90 tablet 0   • Calcium Carb-Cholecalciferol (Calcium + D3) 600-800 MG-UNIT tablet take 1 tablet by mouth twice daily 180 tablet 3   • clopidogrel (PLAVIX) 75 MG tablet TAKE 1 TABLET BY MOUTH EVERY DAY 90 tablet 0   • famotidine (PEPCID) 20 MG tablet Take 1 tablet by mouth 2 (Two) Times a Day Before Meals. 14 tablet 0   • HYDROcodone-acetaminophen (NORCO) 7.5-325 MG per tablet TAKE 1 TABLET 3 TIMES DAILY AS NEEDED FOR PAIN.*MAY TAKE EXTRA IF NEEDED* 100 tablet 0   • lisinopril (PRINIVIL,ZESTRIL) 20 MG tablet Take 1 tablet by mouth Daily. 30 tablet 0   • metoprolol tartrate (LOPRESSOR) 25 MG tablet Take 0.5 tablets by mouth 2 (Two) Times a Day. 90 tablet 0   • nitroglycerin (NITROSTAT) 0.4 MG SL tablet Place 1 tablet under the tongue Every 5 (Five) Minutes As Needed for Chest Pain (Only if SBP Greater Than 100). Take no more than 3 doses in 15 minutes. 25 tablet 0   • Omega-3 Fatty Acids (FISH OIL) 1000 MG capsule capsule Take 2,000 mg by mouth 2 (Two) Times a Day With Meals. INST PER ANESTHESIA PROTOCOL     • pantoprazole (PROTONIX) 40 MG EC tablet Take 1 tablet by mouth Daily. 90 tablet 0   • sildenafil (REVATIO) 20 MG tablet Take 1-2 tablets by mouth 1 hour prior to intercourse 30 tablet 2   • Testosterone 30 MG/ACT solution 1 PUMP ON EACH AXILLA DAILY 90 mL 5   • vitamin D (ERGOCALCIFEROL) 1.25 MG (30938 UT) capsule capsule Take 1 capsule by mouth 1 (One) Time Per Week. 12 capsule 3   • Vortioxetine HBr  (Trintellix) 10 MG tablet TAKE 1 TABLET BY MOUTH EVERY DAY 90 tablet 1   • Vortioxetine HBr (Trintellix) 10 MG tablet Take 10 mg by mouth Daily. 90 tablet 0   • [DISCONTINUED] HYDROcodone Bitartrate ER 10 MG capsule sustained-release 12 hr Take 1 capsule by mouth 2 (Two) Times a Day As Needed for low back pain 60 capsule 0   • [DISCONTINUED] lisinopril (PRINIVIL,ZESTRIL) 40 MG tablet Take 1 tablet by mouth once daily 90 tablet 0   • [DISCONTINUED] terbinafine (lamiSIL) 250 MG tablet Take 1 tablet by mouth Daily. 90 tablet 0     No current facility-administered medications on file prior to visit.       Allergies   Allergen Reactions   • Wasp Venom Anaphylaxis       There were no vitals filed for this visit.  There is no height or weight on file to calculate BMI.   Constitutional:       Appearance: Well-developed.   Eyes:      General: No scleral icterus.     Conjunctiva/sclera: Conjunctivae normal.   HENT:      Head: Normocephalic and atraumatic.   Neck:      Thyroid: No thyromegaly.      Vascular: Normal carotid pulses. No carotid bruit, hepatojugular reflux or JVD.      Trachea: No tracheal deviation.   Pulmonary:      Effort: No respiratory distress.      Breath sounds: Normal breath sounds. No decreased breath sounds. No wheezing. No rhonchi. No rales.   Chest:      Chest wall: Not tender to palpatation.   Cardiovascular:      Normal rate. Regular rhythm.      No gallop.   Pulses:     Carotid: 2+ bilaterally.     Radial: 2+ bilaterally.     Femoral: 2+ bilaterally.     Dorsalis pedis: 2+ bilaterally.     Posterior tibial: 2+ bilaterally.  Abdominal:      General: Bowel sounds are normal. There is no distension.      Palpations: Abdomen is soft.      Tenderness: There is no abdominal tenderness.   Musculoskeletal:         General: No deformity.      Cervical back: Normal range of motion and neck supple. Skin:     Findings: No erythema or rash.   Neurological:      Mental Status: Alert and oriented to person,  place, and time.      Sensory: No sensory deficit.   Psychiatric:         Behavior: Behavior normal.         Lab Results   Component Value Date    WBC 20.53 (H) 2022    HGB 15.8 2022    HCT 45.2 2022    MCV 88.8 2022     2022       Lab Results   Component Value Date    GLUCOSE 143 (H) 2022    BUN 25 (H) 2022    CREATININE 0.96 2022    EGFRIFNONA 69 2018    EGFRIFAFRI >60 2015    BCR 26.0 (H) 2022    K 4.5 2022    CO2 20.7 (L) 2022    CALCIUM 9.0 2022    PROTENTOTREF 7.5 2015    ALBUMIN 4.50 2022    LABIL2 2.0 2015    AST 74 (H) 2022    ALT 50 (H) 2022       Lab Results   Component Value Date    GLUCOSE 143 (H) 2022    CALCIUM 9.0 2022     2022    K 4.5 2022    CO2 20.7 (L) 2022     2022    BUN 25 (H) 2022    CREATININE 0.96 2022    EGFRIFAFRI >60 2015    EGFRIFNONA 69 2018    BCR 26.0 (H) 2022    ANIONGAP 15.3 (H) 2022       Lab Results   Component Value Date    CHOL 130 2022    CHLPL 159 2015    TRIG 276 (H) 2022    HDL 23 (L) 2022    LDL 63 2022         ECG 12 Lead    Date/Time: 2022 3:41 PM  Performed by: Kevin Velasquez MD  Authorized by: Kevin Velasquez MD   Comparison: compared with previous ECG from 8/3/2022  Similar to previous ECG  Rhythm: sinus rhythm  Rate: normal  QRS axis: normal    Clinical impression: normal ECG               22 Cath  Left main: Normal  Left anterior descendin% luminal irregularities proximally 100% occluded in the midportion I do not actually see a stump there is a big collateral from a large diagonal to the apical LAD that retrograde fills it however there is a 60 to 70% lesion in that diagonal  Ramus intermedius:Not present  Circumflex: Very high rising OM1 100% occluded proximally the remainder of the circumflex is  normal  RCA: Is a dominant vessel.  Diffuse 20 to 30% proximal disease in mid disease normal distally no collaterals    3.0 x 23 mm Xience Angela drug-eluting stent in the proximal OM1 at 14 jhonathan, postdilated that with a 3.0 x 20 mm NC trek balloon at 20 jhonathan.     Results for orders placed during the hospital encounter of 07/20/22    Adult Transthoracic Echo Complete W/ Cont if Necessary Per Protocol    Interpretation Summary  · The following left ventricular wall segments are hypokinetic: basal anterolateral and mid anterolateral. New compared wtih prior echo  · Left ventricular ejection fraction appears to be 51 - 55%.  · Left ventricular diastolic function was normal.    DISCUSSION/SUMMARY  47-year-old male who follows) PCI of that vessel with a 3.0 x 23 mm Xience SkyPoint drug-eluting stent.  He was also noted to have a chronically occluded mid LAD that fills via collateral flow from the large diagonal branch that had about a 50% lesion.  He is actually done very well since that time.  He denies any chest pain or dyspnea on exertion.  He does report that his blood pressure has been low as of late and he would like to decrease his lisinopril.    1.  Coronary disease with recent non-ST elevation MI in July 2022  - Status post PCI of the OM with a 3.0 x 23 mm Xience SkyPoint drug-eluting stent  - Continue dual antiplatelet therapy for 1 year.  Aspirin lifelong after.  - No angina at this time.  - Continue atorvastatin 40 mg p.o. nightly.  Tolerating well with no significant issues with myalgias.  - Continue metoprolol tartrate at 25 mg p.o. every 12 hours.  No fatigue.  Resting heart rate in the 60s.    2.  Essential hypertension: Low normal at home.  Plan on decreasing lisinopril to 10 mg p.o. daily.  Patient is aware.  No recent issues with hyperkalemia or renal insufficiency on that therapy.  Change metoprolol to tartrate.    I will see him back in 6 months.

## 2022-09-19 ENCOUNTER — OFFICE VISIT (OUTPATIENT)
Dept: CARDIOLOGY | Facility: CLINIC | Age: 47
End: 2022-09-19

## 2022-09-19 VITALS
BODY MASS INDEX: 34.21 KG/M2 | HEIGHT: 69 IN | SYSTOLIC BLOOD PRESSURE: 122 MMHG | DIASTOLIC BLOOD PRESSURE: 86 MMHG | HEART RATE: 64 BPM | WEIGHT: 231 LBS

## 2022-09-19 DIAGNOSIS — E78.5 HYPERLIPIDEMIA, UNSPECIFIED HYPERLIPIDEMIA TYPE: ICD-10-CM

## 2022-09-19 DIAGNOSIS — I10 ESSENTIAL HYPERTENSION: ICD-10-CM

## 2022-09-19 DIAGNOSIS — I25.10 CORONARY ARTERY DISEASE INVOLVING NATIVE CORONARY ARTERY OF NATIVE HEART WITHOUT ANGINA PECTORIS: Primary | ICD-10-CM

## 2022-09-19 PROCEDURE — 93000 ELECTROCARDIOGRAM COMPLETE: CPT | Performed by: INTERNAL MEDICINE

## 2022-09-19 PROCEDURE — 99214 OFFICE O/P EST MOD 30 MIN: CPT | Performed by: INTERNAL MEDICINE

## 2022-09-23 ENCOUNTER — TELEPHONE (OUTPATIENT)
Dept: CARDIOLOGY | Facility: CLINIC | Age: 47
End: 2022-09-23

## 2022-09-23 DIAGNOSIS — I21.4 NSTEMI (NON-ST ELEVATED MYOCARDIAL INFARCTION): ICD-10-CM

## 2022-09-23 DIAGNOSIS — I25.10 CAD S/P PERCUTANEOUS CORONARY ANGIOPLASTY: Primary | ICD-10-CM

## 2022-09-23 DIAGNOSIS — Z98.61 CAD S/P PERCUTANEOUS CORONARY ANGIOPLASTY: Primary | ICD-10-CM

## 2022-09-23 NOTE — TELEPHONE ENCOUNTER
I heard from the pt earlier this week that they still haven't heard from Cardiac Rehab at [facility]. And when they tried to call that they were told that we had to send the order/referral in for them to be setup.    I called Cardiac Rehab at [facility] today and left a message to have them call us back. Loni placed a referral on  8/3/2022. I am not sure if a different order needs to be placed or what else is holding things up.     Margarita

## 2022-09-23 NOTE — TELEPHONE ENCOUNTER
I heard back. They need the physician to sign the order and need to have an updated diagnosis. They said S/P PCI will be covered with his insurance.    I put the order in, can you please co-sign it when you get a chance.    Margarita

## 2022-11-03 ENCOUNTER — TELEPHONE (OUTPATIENT)
Dept: CARDIOLOGY | Facility: CLINIC | Age: 47
End: 2022-11-03

## 2022-11-03 DIAGNOSIS — I25.10 CORONARY ARTERY DISEASE INVOLVING NATIVE CORONARY ARTERY OF NATIVE HEART WITHOUT ANGINA PECTORIS: Primary | ICD-10-CM

## 2022-11-03 NOTE — TELEPHONE ENCOUNTER
Attempted to call patient, but no answer and VM was full so could not leave message. Will continue to try and reach patient.     Sherrell Moy RN  [place] [facility] [place]

## 2022-11-04 NOTE — TELEPHONE ENCOUNTER
Attempted to call patient, but no answer and VM was full so could not leave message. Will continue to try and reach patient.      Sherrell Moy RN  Triage [facility] [place]

## 2022-11-07 NOTE — TELEPHONE ENCOUNTER
Notified patient of recommendations. Patient verbalized understanding.    Sherrell Moy RN  Triage [facility] [place] [facility]

## 2023-03-20 ENCOUNTER — OFFICE VISIT (OUTPATIENT)
Dept: CARDIOLOGY | Facility: CLINIC | Age: 48
End: 2023-03-20
Payer: COMMERCIAL

## 2023-03-20 VITALS
WEIGHT: 250.6 LBS | BODY MASS INDEX: 37.12 KG/M2 | DIASTOLIC BLOOD PRESSURE: 72 MMHG | HEART RATE: 58 BPM | HEIGHT: 69 IN | SYSTOLIC BLOOD PRESSURE: 112 MMHG

## 2023-03-20 DIAGNOSIS — E78.5 HYPERLIPIDEMIA, UNSPECIFIED HYPERLIPIDEMIA TYPE: ICD-10-CM

## 2023-03-20 DIAGNOSIS — Z95.5 STATUS POST INSERTION OF DRUG ELUTING CORONARY ARTERY STENT: ICD-10-CM

## 2023-03-20 DIAGNOSIS — I25.10 CORONARY ARTERY DISEASE INVOLVING NATIVE CORONARY ARTERY OF NATIVE HEART WITHOUT ANGINA PECTORIS: ICD-10-CM

## 2023-03-20 DIAGNOSIS — I10 ESSENTIAL HYPERTENSION: Primary | ICD-10-CM

## 2023-03-20 PROCEDURE — 93000 ELECTROCARDIOGRAM COMPLETE: CPT | Performed by: NURSE PRACTITIONER

## 2023-03-20 PROCEDURE — 99214 OFFICE O/P EST MOD 30 MIN: CPT | Performed by: NURSE PRACTITIONER

## 2023-03-20 NOTE — PROGRESS NOTES
Date of Office Visit: 2023  Encounter Provider: TAMELA Pablo  Place of Service: [facility] CARDIOLOGY  Patient Name: Heber Harley  :1975    No chief complaint on file.  : 6-month follow-up    HPI: Heber Harley is a 47 y.o. male who is a patient of Dr. Velasquez.  He is new to me today and presents for 6-month office follow-up.  He has a history of coronary artery disease, hyperlipidemia, hypertension and palpitations.  He has a family history of premature coronary disease with his father having three-vessel bypass in his late 50s.    Patient presented to hospital with non-STEMI in 2022.  Left heart cath showed chronic occlusion of the mid LAD, moderate to severe disease in diagonal and acute occlusion of OM1.  He underwent PCI/SID to his OM1.  Patient maintained normal LV function.  2D echocardiogram the following day showed LVEF 51 to 55%.     Lipid panel 2022 showed triglycerides 276 and HDL 23.  Liver enzymes slightly elevated which appears to be chronic.  On statin.  Lipid panel in 2022 showed worsening triglycerides and LDL (triglycerides 297, LDL 99) and HDL only 28. Repatha was added by PCP at this time.   Hemoglobin A1c 5.7 at that time.     Patient presents today with no complaints of angina.  He has had about 5 or 6 episodes of clamminess and lightheadedness around midday over the last 6 months.  Patient reports not eating breakfast in the morning.  He is very [place].  He has had no chest pain, shortness of breath or lightheadedness during activity.  His blood pressure has been well controlled.  He was recently started on Repatha in addition to his statin for better control of his lipid panel.  He does have indigestion which is not a new symptom for him.  Patient is on Protonix.  He does not exercise but he does lead an active life and gets quite a bit of exercise taking care of  their land and working.  EKG is  normal.  He remains on DAPT with aspirin and Plavix.     Previous testing and notes have been reviewed by me.   Past Medical History:   Diagnosis Date   • Chronic back pain     COM[facility]ALTH PN MGMT [place], NARC RX   • GERD (gastroesophageal reflux disease)    • Hyperlipidemia    • Hypertension    • Hypertriglyceridemia    • Sleep apnea     On CPAP       Past Surgical History:   Procedure Laterality Date   • ANKLE SURGERY Right     ORIF   • CARDIAC CATHETERIZATION Left 2022    Procedure: Coronary Angiography;  Surgeon: Mayito Levy MD;  Location: [facility] CATH INVASIVE LOCATION;  Service: Cardiovascular;  Laterality: Left;   • CARDIAC CATHETERIZATION N/A 2022    Procedure: Left Heart Cath;  Surgeon: Mayito Levy MD;  Location: [facility] CATH INVASIVE LOCATION;  Service: Cardiovascular;  Laterality: N/A;   • CARDIAC CATHETERIZATION N/A 2022    Procedure: Stent SID coronary;  Surgeon: Mayito Levy MD;  Location: [facility] CATH INVASIVE LOCATION;  Service: Cardiovascular;  Laterality: N/A;   • CARDIAC CATHETERIZATION N/A 2022    Procedure: Left ventriculography;  Surgeon: Mayito Levy MD;  Location: [facility] INVASIVE LOCATION;  Service: Cardiovascular;  Laterality: N/A;   • HEMORRHOIDECTOMY N/A 11/3/2021    Procedure: HEMORRHOID BANDING;  Surgeon: Matthew Coronel MD;  Location[facility] [place] [facility];  Service: General;  Laterality: N/A;       Social History     Socioeconomic History   • Marital status:    Tobacco Use   • Smoking status: Former     Years: 5.00     Types: Cigarettes     Quit date:      Years since quittin.2   • Smokeless tobacco: Current   • Tobacco comments:     Caffeine - 2 daily   Vaping Use   • Vaping Use: Never used   Substance and Sexual Activity   • Alcohol use: Yes     Alcohol/week: 12.0 standard drinks     Types: 12 Cans of beer per week   • Drug use: Yes     Frequency: 7.0 times per week     Types: Marijuana     Comment: daily/PAIN MGMT RX NARC CH BACK  PAIN   • Sexual activity: Defer       Family History   Problem Relation Age of Onset   • Hypertension Mother    • Coronary artery disease Mother         Mother with stent   • Heart disease Father         Father with MI and 3 vessel CABG in upper 50's   • Hypertension Father    • Diabetes Maternal Uncle    • Cancer Maternal Uncle    • Cancer Paternal Grandfather    • Malig Hyperthermia Neg Hx        Review of Systems   Constitutional: Negative.   HENT: Negative.    Eyes: Negative.    Cardiovascular: Negative.    Respiratory: Negative.    Endocrine: Negative.    Hematologic/Lymphatic:        Asa/ plavix   Skin: Negative.    Musculoskeletal: Negative.    Gastrointestinal: Negative.    Genitourinary: Negative.    Neurological: Negative.    Psychiatric/Behavioral: Negative.    Allergic/Immunologic: Negative.        Allergies   Allergen Reactions   • Wasp Venom Anaphylaxis         Current Outpatient Medications:   •  aspirin 81 MG EC tablet, Take 1 tablet by mouth Daily., Disp: 30 tablet, Rfl: 0  •  atorvastatin (LIPITOR) 40 MG tablet, Take 1 tablet by mouth daily., Disp: 90 tablet, Rfl: 1  •  Calcium Carb-Cholecalciferol (Calcium + D3) 600-800 MG-UNIT tablet, Take 1 tablet by mouth twice daily, Disp: 180 tablet, Rfl: 3  •  clopidogrel (PLAVIX) 75 MG tablet, TAKE 1 TABLET BY MOUTH EVERY DAY, Disp: 90 tablet, Rfl: 0  •  clopidogrel (PLAVIX) 75 MG tablet, Take 1 tablet by mouth daily., Disp: 90 tablet, Rfl: 1  •  Evolocumab (Repatha SureClick) solution auto-injector SureClick injection, Inject 1 pen subcutaneously every 14 days., Disp: 6 mL, Rfl: 1  •  HYDROcodone-acetaminophen (NORCO) 7.5-325 MG per tablet, TAKE 1 TABLET 3 TIMES DAILY AS NEEDED FOR PAIN.*MAY TAKE EXTRA IF NEEDED*, Disp: 100 tablet, Rfl: 0  •  HYDROcodone-acetaminophen (NORCO) 7.5-325 MG per tablet, Take 1 tablet by mouth every 6 (six) hours as needed for Pain for up to 30 days., Disp: 80 tablet, Rfl: 0  •  HYDROcodone-acetaminophen (NORCO) 7.5-325 MG per  tablet, Take 1 tablet by mouth every 6 (six) hours as needed for Pain for up to 30 days, Disp: 100 tablet, Rfl: 0  •  HYDROcodone-acetaminophen (NORCO) 7.5-325 MG per tablet, Take 1 tablet by mouth every 6 hours as needed for Pain, Disp: 80 tablet, Rfl: 0  •  HYDROcodone-acetaminophen (NORCO) 7.5-325 MG per tablet, Take 1 tablet by mouth every 6 (six) hours as needed for Pain. Max Daily Amount: 4 tablets, Disp: 100 tablet, Rfl: 0  •  lisinopril (PRINIVIL,ZESTRIL) 10 MG tablet, Take 1 tablet by mouth Daily., Disp: 90 tablet, Rfl: 1  •  lisinopril (PRINIVIL,ZESTRIL) 20 MG tablet, Take 1 tablet by mouth Daily., Disp: 30 tablet, Rfl: 0  •  metoprolol succinate XL (TOPROL-XL) 25 MG 24 hr tablet, Take 1 tablet by mouth daily., Disp: 90 tablet, Rfl: 1  •  metoprolol tartrate (LOPRESSOR) 25 MG tablet, Take 1/2 tablet by mouth 2 (Two) Times a Day., Disp: 30 tablet, Rfl: 0  •  nitroglycerin (NITROSTAT) 0.4 MG SL tablet, Place 1 tablet under the tongue Every 5 (Five) Minutes As Needed for Chest Pain (Only if SBP Greater Than 100). Take no more than 3 doses in 15 minutes., Disp: 25 tablet, Rfl: 0  •  pantoprazole (PROTONIX) 40 MG EC tablet, TAKE 1 TABLET TWICE DAILY 30 MINUTES BEFORE BREAKFAST AND DINNER., Disp: 180 tablet, Rfl: 3  •  pantoprazole (PROTONIX) 40 MG EC tablet, Take 1 tablet by mouth 2 times daily as needed., Disp: 180 tablet, Rfl: 1  •  pantoprazole (PROTONIX) 40 MG EC tablet, Take 1 tablet by mouth 2 times daily as needed, Disp: 180 tablet, Rfl: 1  •  tadalafil (CIALIS) 10 MG tablet, Take 1 tablet by mouth daily as needed for Erectile Dysfunction., Disp: 10 tablet, Rfl: 0  •  vitamin D (ERGOCALCIFEROL) 1.25 MG (45969 UT) capsule capsule, Take 1 capsule by mouth 1 (One) Time Per Week., Disp: 12 capsule, Rfl: 3  •  vitamin D (ERGOCALCIFEROL) 1.25 MG (03995 UT) capsule capsule, Take 1 capsule by mouth once weekly, Disp: 12 capsule, Rfl: 1  •  Vortioxetine HBr (TRINTELLIX) 10 MG tablet tablet, Take 1 tablet by mouth  Daily., Disp: 90 tablet, Rfl: 1      Objective:     There were no vitals filed for this visit.  There is no height or weight on file to calculate BMI.     Left heart cath 2022:  LEFT VENTRICULOGRAPHY: The LV pressure was 100/16.  There was normal global LV systolic function ejection fraction is still 50% it appears that it is a little bit sluggish in the anterior lateral apical wall calcium is seen in the coronary arteries.  There was no mitral insufficiency or gradient across the aortic valve on pullback.     CORONARY ANGIOGRAPHY:  Left main: Normal  Left anterior descendin% luminal irregularities proximally 100% occluded in the midportion I do not actually see a stump there is a big collateral from a large diagonal to the apical LAD that retrograde fills it however there is a 60 to 70% lesion in that diagonal  Ramus intermedius:Not present  Circumflex: Very high rising OM1 100% occluded proximally the remainder of the circumflex is normal  RCA: Is a dominant vessel.  Diffuse 20 to 30% proximal disease in mid disease normal distally no collaterals    2D Echocardiogram 2022:  • The following left ventricular wall segments are hypokinetic: basal anterolateral and mid anterolateral. New compared wtih prior echo  • Left ventricular ejection fraction appears to be 51 - 55%.  • Left ventricular diastolic function was normal.  • No significant valvular disease.     Lexiscan stress test 2017:  • Diaphragmatic attenuation and GI artifacts are present.  • Myocardial perfusion imaging indicates a normal myocardial perfusion study with no evidence of ischemia.  • Left ventricular ejection fraction is normal (Calculated EF = 67%).  • Impressions are consistent with a low risk study.    PHYSICAL EXAM:    Constitutional:       Appearance: Healthy appearance. Not in distress.   Neck:      Vascular: No JVR. JVD normal.   Pulmonary:      Effort: Pulmonary effort is normal.      Breath sounds: Normal breath  sounds. No wheezing. No rhonchi. No rales.   Chest:      Chest wall: Not tender to palpatation.   Cardiovascular:      PMI at left midclavicular line. Normal rate. Regular rhythm. Normal S1. Normal S2.      Murmurs: There is no murmur.      No gallop. No click. No rub.   Pulses:     Intact distal pulses.   Edema:     Peripheral edema absent.   Abdominal:      General: Bowel sounds are normal.      Palpations: Abdomen is soft.      Tenderness: There is no abdominal tenderness.   Musculoskeletal: Normal range of motion.         General: No tenderness. Skin:     General: Skin is warm and dry.   Neurological:      General: No focal deficit present.      Mental Status: Alert and oriented to person, place and time.           ECG 12 Lead    Date/Time: 3/20/2023 2:36 PM  Performed by: Jami Cevallos APRN  Authorized by: Jami Cevallos APRN   Comparison: compared with previous ECG from 9/19/2022  Similar to previous ECG  Rhythm: sinus rhythm  Rate: normal  BPM: 58  Conduction: conduction normal  ST Segments: ST segments normal  T Waves: T waves normal    Clinical impression: normal ECG              Assessment:       Diagnosis Plan   1. Essential hypertension        2. Coronary artery disease involving native coronary artery of native heart without angina pectoris        3. Status post insertion of drug eluting coronary artery stent        4. Hyperlipidemia, unspecified hyperlipidemia type          No orders of the defined types were placed in this encounter.         Plan:       1.  Coronary artery disease: Status post PCI/SID to OM1 (07/2022).  LV function normal.  On DAPT with ASA/plavix, statin, Repatha and beta blocker.  Will remain on DAPT lifelong as he was found to have chronic occlusion of the mid LAD, moderate to severe disease in diagonal.  PCP changed him from metoprolol to tartrate 12.5 mg twice daily to metoprolol succinate 25 mg daily.  No angina.  Normal EKG.   2.  Hypertension: Well-controlled  3.   Hyperlipidemia: On statin therapy.  Repatha added January.  Lipid panel followed by PCP  4.  Episodes of dizziness/clamminess: Appears to be around the same time of the day when it happens which is midday.  Patient has not been eating breakfast.  Usually he will have a spoonful of peanut butter which then makes him feel better.  This is likely attributed to hypoglycemia.  If hypoglycemia ruled out, patient may need repeat ischemic eval.     Mr. Harley was has a follow-up appointment with Dr. Velasquez in July.  He will call sooner for any questions or concerns.         Your medication list          Accurate as of March 20, 2023 11:42 AM. If you have any questions, ask your nurse or doctor.            CONTINUE taking these medications      Instructions Last Dose Given Next Dose Due   Adult Aspirin Regimen 81 MG EC tablet  Generic drug: aspirin      Take 1 tablet by mouth Daily.       atorvastatin 40 MG tablet  Commonly known as: LIPITOR      Take 1 tablet by mouth daily.       Calcium+D3 600-20 MG-MCG tablet  Generic drug: Calcium Carb-Cholecalciferol      Take 1 tablet by mouth twice daily       clopidogrel 75 MG tablet  Commonly known as: PLAVIX      TAKE 1 TABLET BY MOUTH EVERY DAY       clopidogrel 75 MG tablet  Commonly known as: PLAVIX      Take 1 tablet by mouth daily.       HYDROcodone-acetaminophen 7.5-325 MG per tablet  Commonly known as: NORCO      TAKE 1 TABLET 3 TIMES DAILY AS NEEDED FOR PAIN.*MAY TAKE EXTRA IF NEEDED*       HYDROcodone-acetaminophen 7.5-325 MG per tablet  Commonly known as: NORCO      Take 1 tablet by mouth every 6 (six) hours as needed for Pain for up to 30 days.       HYDROcodone-acetaminophen 7.5-325 MG per tablet  Commonly known as: NORCO      Take 1 tablet by mouth every 6 (six) hours as needed for Pain for up to 30 days       HYDROcodone-acetaminophen 7.5-325 MG per tablet  Commonly known as: NORCO      Take 1 tablet by mouth every 6 hours as needed for Pain        HYDROcodone-acetaminophen 7.5-325 MG per tablet  Commonly known as: NORCO      Take 1 tablet by mouth every 6 (six) hours as needed for Pain. Max Daily Amount: 4 tablets       lisinopril 20 MG tablet  Commonly known as: PRINIVIL,ZESTRIL      Take 1 tablet by mouth Daily.       lisinopril 10 MG tablet  Commonly known as: PRINIVIL,ZESTRIL      Take 1 tablet by mouth Daily.       metoprolol succinate XL 25 MG 24 hr tablet  Commonly known as: TOPROL-XL      Take 1 tablet by mouth daily.       metoprolol tartrate 25 MG tablet  Commonly known as: LOPRESSOR      Take 1/2 tablet by mouth 2 (Two) Times a Day.       nitroglycerin 0.4 MG SL tablet  Commonly known as: NITROSTAT      Place 1 tablet under the tongue Every 5 (Five) Minutes As Needed for Chest Pain (Only if SBP Greater Than 100). Take no more than 3 doses in 15 minutes.       pantoprazole 40 MG EC tablet  Commonly known as: PROTONIX      TAKE 1 TABLET TWICE DAILY 30 MINUTES BEFORE BREAKFAST AND DINNER.       pantoprazole 40 MG EC tablet  Commonly known as: PROTONIX      Take 1 tablet by mouth 2 times daily as needed.       pantoprazole 40 MG EC tablet  Commonly known as: PROTONIX      Take 1 tablet by mouth 2 times daily as needed       Repatha SureClick solution auto-injector SureClick injection  Generic drug: Evolocumab      Inject 1 pen subcutaneously every 14 days.       tadalafil 10 MG tablet  Commonly known as: CIALIS      Take 1 tablet by mouth daily as needed for Erectile Dysfunction.       vitamin D 1.25 MG (44231 UT) capsule capsule  Commonly known as: ERGOCALCIFEROL      Take 1 capsule by mouth 1 (One) Time Per Week.       vitamin D 1.25 MG (23153 UT) capsule capsule  Commonly known as: ERGOCALCIFEROL      Take 1 capsule by mouth once weekly       Vortioxetine HBr 10 MG tablet tablet  Commonly known as: TRINTELLIX      Take 1 tablet by mouth Daily.                As always, it has been a pleasure to participate in your patient's care.      Sincerely,        Lucia Cevallos, APRN

## 2023-09-28 ENCOUNTER — APPOINTMENT (OUTPATIENT)
Dept: CT IMAGING | Facility: HOSPITAL | Age: 48
End: 2023-09-28
Payer: COMMERCIAL

## 2023-09-28 ENCOUNTER — HOSPITAL ENCOUNTER (EMERGENCY)
Facility: HOSPITAL | Age: 48
Discharge: HOME | End: 2023-09-28
Attending: STUDENT IN AN ORGANIZED HEALTH CARE EDUCATION/TRAINING PROGRAM
Payer: COMMERCIAL

## 2023-09-28 ENCOUNTER — APPOINTMENT (OUTPATIENT)
Dept: CARDIOLOGY | Facility: HOSPITAL | Age: 48
End: 2023-09-28
Payer: COMMERCIAL

## 2023-09-28 VITALS
OXYGEN SATURATION: 96 % | HEART RATE: 89 BPM | WEIGHT: 244 LBS | TEMPERATURE: 97.8 F | RESPIRATION RATE: 12 BRPM | SYSTOLIC BLOOD PRESSURE: 131 MMHG | DIASTOLIC BLOOD PRESSURE: 86 MMHG | BODY MASS INDEX: 34.16 KG/M2 | HEIGHT: 71 IN

## 2023-09-28 DIAGNOSIS — I82.462 ACUTE DEEP VEIN THROMBOSIS (DVT) OF CALF MUSCLE VEIN OF LEFT LOWER EXTREMITY: Primary | ICD-10-CM

## 2023-09-28 LAB
ALBUMIN SERPL-MCNC: 4.5 G/DL (ref 3.5–5.2)
ALBUMIN/GLOB SERPL: 1.8 G/DL
ALP SERPL-CCNC: 90 U/L (ref 39–117)
ALT SERPL W P-5'-P-CCNC: 30 U/L (ref 1–41)
ANION GAP SERPL CALCULATED.3IONS-SCNC: 11.1 MMOL/L (ref 5–15)
APTT PPP: 24.8 SECONDS (ref 22.7–35.4)
AST SERPL-CCNC: 22 U/L (ref 1–40)
BASOPHILS # BLD AUTO: 0.08 10*3/MM3 (ref 0–0.2)
BASOPHILS NFR BLD AUTO: 0.9 % (ref 0–1.5)
BH CV LOW VAS LEFT LESSER SAPH VESSEL: 1
BH CV LOW VAS LEFT POSTERIOR TIBIAL VESSEL: 1
BH CV LOW VAS LEFT SOLEAL VESSEL: 1
BH CV LOWER VASCULAR LEFT COMMON FEMORAL AUGMENT: NORMAL
BH CV LOWER VASCULAR LEFT COMMON FEMORAL COMPETENT: NORMAL
BH CV LOWER VASCULAR LEFT COMMON FEMORAL COMPRESS: NORMAL
BH CV LOWER VASCULAR LEFT COMMON FEMORAL PHASIC: NORMAL
BH CV LOWER VASCULAR LEFT COMMON FEMORAL SPONT: NORMAL
BH CV LOWER VASCULAR LEFT DISTAL FEMORAL COMPRESS: NORMAL
BH CV LOWER VASCULAR LEFT GASTRONEMIUS COMPRESS: NORMAL
BH CV LOWER VASCULAR LEFT GREATER SAPH AK COMPRESS: NORMAL
BH CV LOWER VASCULAR LEFT GREATER SAPH BK COMPRESS: NORMAL
BH CV LOWER VASCULAR LEFT LESSER SAPH COMPRESS: NORMAL
BH CV LOWER VASCULAR LEFT LESSER SAPH THROMBUS: NORMAL
BH CV LOWER VASCULAR LEFT MID FEMORAL AUGMENT: NORMAL
BH CV LOWER VASCULAR LEFT MID FEMORAL COMPETENT: NORMAL
BH CV LOWER VASCULAR LEFT MID FEMORAL COMPRESS: NORMAL
BH CV LOWER VASCULAR LEFT MID FEMORAL PHASIC: NORMAL
BH CV LOWER VASCULAR LEFT MID FEMORAL SPONT: NORMAL
BH CV LOWER VASCULAR LEFT PERONEAL COMPRESS: NORMAL
BH CV LOWER VASCULAR LEFT POPLITEAL AUGMENT: NORMAL
BH CV LOWER VASCULAR LEFT POPLITEAL COMPETENT: NORMAL
BH CV LOWER VASCULAR LEFT POPLITEAL COMPRESS: NORMAL
BH CV LOWER VASCULAR LEFT POPLITEAL PHASIC: NORMAL
BH CV LOWER VASCULAR LEFT POPLITEAL SPONT: NORMAL
BH CV LOWER VASCULAR LEFT POSTERIOR TIBIAL COMPRESS: NORMAL
BH CV LOWER VASCULAR LEFT POSTERIOR TIBIAL THROMBUS: NORMAL
BH CV LOWER VASCULAR LEFT PROFUNDA FEMORAL COMPRESS: NORMAL
BH CV LOWER VASCULAR LEFT PROXIMAL FEMORAL COMPRESS: NORMAL
BH CV LOWER VASCULAR LEFT SAPHENOFEMORAL JUNCTION COMPRESS: NORMAL
BH CV LOWER VASCULAR LEFT SOLEAL COMPRESS: NORMAL
BH CV LOWER VASCULAR LEFT SOLEAL THROMBUS: NORMAL
BH CV LOWER VASCULAR RIGHT COMMON FEMORAL AUGMENT: NORMAL
BH CV LOWER VASCULAR RIGHT COMMON FEMORAL COMPETENT: NORMAL
BH CV LOWER VASCULAR RIGHT COMMON FEMORAL COMPRESS: NORMAL
BH CV LOWER VASCULAR RIGHT COMMON FEMORAL PHASIC: NORMAL
BH CV LOWER VASCULAR RIGHT COMMON FEMORAL SPONT: NORMAL
BH CV VAS PRELIMINARY FINDINGS SCRIPTING: 1
BILIRUB SERPL-MCNC: 0.6 MG/DL (ref 0–1.2)
BUN SERPL-MCNC: 11 MG/DL (ref 6–20)
BUN/CREAT SERPL: 12.9 (ref 7–25)
CALCIUM SPEC-SCNC: 9.4 MG/DL (ref 8.6–10.5)
CHLORIDE SERPL-SCNC: 103 MMOL/L (ref 98–107)
CO2 SERPL-SCNC: 23.9 MMOL/L (ref 22–29)
CREAT SERPL-MCNC: 0.85 MG/DL (ref 0.76–1.27)
DEPRECATED RDW RBC AUTO: 41.6 FL (ref 37–54)
EGFRCR SERPLBLD CKD-EPI 2021: 107.2 ML/MIN/1.73
EOSINOPHIL # BLD AUTO: 0.27 10*3/MM3 (ref 0–0.4)
EOSINOPHIL NFR BLD AUTO: 3 % (ref 0.3–6.2)
ERYTHROCYTE [DISTWIDTH] IN BLOOD BY AUTOMATED COUNT: 12.6 % (ref 12.3–15.4)
GLOBULIN UR ELPH-MCNC: 2.5 GM/DL
GLUCOSE SERPL-MCNC: 142 MG/DL (ref 65–99)
HCT VFR BLD AUTO: 49.5 % (ref 37.5–51)
HGB BLD-MCNC: 17.1 G/DL (ref 13–17.7)
IMM GRANULOCYTES # BLD AUTO: 0.03 10*3/MM3 (ref 0–0.05)
IMM GRANULOCYTES NFR BLD AUTO: 0.3 % (ref 0–0.5)
INR PPP: 1.03 (ref 0.9–1.1)
LYMPHOCYTES # BLD AUTO: 2.35 10*3/MM3 (ref 0.7–3.1)
LYMPHOCYTES NFR BLD AUTO: 26 % (ref 19.6–45.3)
MCH RBC QN AUTO: 31 PG (ref 26.6–33)
MCHC RBC AUTO-ENTMCNC: 34.5 G/DL (ref 31.5–35.7)
MCV RBC AUTO: 89.8 FL (ref 79–97)
MONOCYTES # BLD AUTO: 0.86 10*3/MM3 (ref 0.1–0.9)
MONOCYTES NFR BLD AUTO: 9.5 % (ref 5–12)
NEUTROPHILS NFR BLD AUTO: 5.44 10*3/MM3 (ref 1.7–7)
NEUTROPHILS NFR BLD AUTO: 60.3 % (ref 42.7–76)
NRBC BLD AUTO-RTO: 0 /100 WBC (ref 0–0.2)
PLATELET # BLD AUTO: 246 10*3/MM3 (ref 140–450)
PMV BLD AUTO: 10.8 FL (ref 6–12)
POTASSIUM SERPL-SCNC: 3.7 MMOL/L (ref 3.5–5.2)
PROT SERPL-MCNC: 7 G/DL (ref 6–8.5)
PROTHROMBIN TIME: 13.7 SECONDS (ref 11.7–14.2)
RBC # BLD AUTO: 5.51 10*6/MM3 (ref 4.14–5.8)
SODIUM SERPL-SCNC: 138 MMOL/L (ref 136–145)
TROPONIN T SERPL HS-MCNC: 6 NG/L
WBC NRBC COR # BLD: 9.03 10*3/MM3 (ref 3.4–10.8)

## 2023-09-28 PROCEDURE — 80053 COMPREHEN METABOLIC PANEL: CPT | Performed by: STUDENT IN AN ORGANIZED HEALTH CARE EDUCATION/TRAINING PROGRAM

## 2023-09-28 PROCEDURE — 71275 CT ANGIOGRAPHY CHEST: CPT

## 2023-09-28 PROCEDURE — 85610 PROTHROMBIN TIME: CPT | Performed by: STUDENT IN AN ORGANIZED HEALTH CARE EDUCATION/TRAINING PROGRAM

## 2023-09-28 PROCEDURE — 85025 COMPLETE CBC W/AUTO DIFF WBC: CPT | Performed by: STUDENT IN AN ORGANIZED HEALTH CARE EDUCATION/TRAINING PROGRAM

## 2023-09-28 PROCEDURE — 93005 ELECTROCARDIOGRAM TRACING: CPT | Performed by: STUDENT IN AN ORGANIZED HEALTH CARE EDUCATION/TRAINING PROGRAM

## 2023-09-28 PROCEDURE — 84484 ASSAY OF TROPONIN QUANT: CPT | Performed by: STUDENT IN AN ORGANIZED HEALTH CARE EDUCATION/TRAINING PROGRAM

## 2023-09-28 PROCEDURE — 85730 THROMBOPLASTIN TIME PARTIAL: CPT | Performed by: STUDENT IN AN ORGANIZED HEALTH CARE EDUCATION/TRAINING PROGRAM

## 2023-09-28 PROCEDURE — 25510000001 IOPAMIDOL PER 1 ML: Performed by: STUDENT IN AN ORGANIZED HEALTH CARE EDUCATION/TRAINING PROGRAM

## 2023-09-28 PROCEDURE — 99285 EMERGENCY DEPT VISIT HI MDM: CPT

## 2023-09-28 PROCEDURE — 93971 EXTREMITY STUDY: CPT

## 2023-09-28 RX ADMIN — IOPAMIDOL 95 ML: 755 INJECTION, SOLUTION INTRAVENOUS at 19:27

## 2023-09-28 NOTE — ED NOTES
Pt to ER via PV for L leg pain. Pt has hx of blood clot in the same leg, states it feels the same as before

## 2023-09-29 LAB
QT INTERVAL: 383 MS
QTC INTERVAL: 445 MS

## 2023-09-29 NOTE — ED PROVIDER NOTES
EMERGENCY DEPARTMENT ENCOUNTER    Room Number:  12/12  PCP: Deya Pack APRN  History obtained from: Patient      HPI:  Chief Complaint: Left calf pain  A complete HPI/ROS/PMH/PSH/SH/FH are unobtainable due to: Not applicable  Context: Heber Harley is a 48 y.o. male who presents to the ED c/o left calf pain.  Ongoing for the last 2 days.  Not currently on anticoagulation.  No other recent illness, fever, chills.  No chest pain or shortness of breath.            PAST MEDICAL HISTORY  Active Ambulatory Problems     Diagnosis Date Noted    Grade III hemorrhoids 10/12/2021    Chronic back pain     Chronic right SI joint pain     Essential hypertension 06/29/2022    Precordial pain 06/29/2022    HLD (hyperlipidemia) 07/20/2022    Chest pain 07/20/2022    Elevated troponin 07/20/2022    EVAN (obstructive sleep apnea) 07/20/2022    Obesity (BMI 30-39.9) 07/20/2022    Wasp sting-induced anaphylaxis 07/21/2022    Leukocytosis 07/21/2022    Hyperglycemia, drug-induced 07/21/2022    NSTEMI (non-ST elevated myocardial infarction) 07/22/2022    Coronary artery disease involving native coronary artery of native heart without angina pectoris 08/03/2022    Status post insertion of drug eluting coronary artery stent 03/20/2023     Resolved Ambulatory Problems     Diagnosis Date Noted    No Resolved Ambulatory Problems     Past Medical History:   Diagnosis Date    GERD (gastroesophageal reflux disease)     Hyperlipidemia     Hypertension     Hypertriglyceridemia     MI, old     Sleep apnea     Venous embolism and thrombosis of superficial vessels of lower extremity          PAST SURGICAL HISTORY  Past Surgical History:   Procedure Laterality Date    ANKLE SURGERY Right     ORIF    CARDIAC CATHETERIZATION Left 7/21/2022    Procedure: Coronary Angiography;  Surgeon: Mayito Levy MD;  Location: [facility] CATH INVASIVE LOCATION;  Service: Cardiovascular;  Laterality: Left;    CARDIAC CATHETERIZATION N/A 7/21/2022     Procedure: Left Heart Cath;  Surgeon: Mayito Levy MD;  Location:  [facility] CATH INVASIVE LOCATION;  Service: Cardiovascular;  Laterality: N/A;    CARDIAC CATHETERIZATION N/A 2022    Procedure: Stent SID coronary;  Surgeon: Mayito Levy MD;  Location:  [facility] CATH INVASIVE LOCATION;  Service: Cardiovascular;  Laterality: N/A;    CARDIAC CATHETERIZATION N/A 2022    Procedure: Left ventriculography;  Surgeon: Mayito Levy MD;  Location:  [facility] CATH INVASIVE LOCATION;  Service: Cardiovascular;  Laterality: N/A;    HEMORRHOIDECTOMY N/A 11/3/2021    Procedure: HEMORRHOID BANDING;  Surgeon: Matthew Coronel MD;  Location:  [facility];  Service: General;  Laterality: N/A;         FAMILY HISTORY  Family History   Problem Relation Age of Onset    Hypertension Mother     Coronary artery disease Mother         Mother with stent    Heart disease Father         Father with MI and 3 vessel CABG in upper 50's    Hypertension Father     Clotting disorder Father     Cancer Paternal Grandfather     Diabetes Maternal Uncle     Cancer Maternal Uncle     Malig Hyperthermia Neg Hx          SOCIAL HISTORY  Social History     Socioeconomic History    Marital status:    Tobacco Use    Smoking status: Former     Years: 5.00     Types: Cigarettes     Quit date:      Years since quittin.7    Smokeless tobacco: Current    Tobacco comments:     Caffeine - 2 daily   Vaping Use    Vaping Use: Never used   Substance and Sexual Activity    Alcohol use: Yes     Alcohol/week: 12.0 standard drinks     Types: 12 Cans of beer per week    Drug use: Yes     Frequency: 7.0 times per week     Types: Marijuana     Comment: daily/PAIN MGMT RX NARC CH BACK PAIN    Sexual activity: Defer         ALLERGIES  Wasp venom and Wellbutrin [bupropion]        REVIEW OF SYSTEMS    As per HPI      PHYSICAL EXAM  ED Triage Vitals   Temp Heart Rate Resp BP SpO2   23 1710 23 1710 23 1710 23 1718 23 1710   97.8  °F (36.6 °C) 93 18 (!) 137/103 97 %      Temp src Heart Rate Source Patient Position BP Location FiO2 (%)   -- 09/28/23 1821 -- -- --    Monitor          Physical Exam  Constitutional:       General: He is not in acute distress.  HENT:      Head: Normocephalic and atraumatic.   Pulmonary:      Effort: No respiratory distress.   Abdominal:      General: There is no distension.      Tenderness: There is no abdominal tenderness.   Musculoskeletal:         General: Tenderness present. No swelling or deformity.      Comments: Tenderness to palpation over the left calf   Skin:     General: Skin is warm and dry.   Neurological:      General: No focal deficit present.      Mental Status: He is alert. Mental status is at baseline.         Vital signs and nursing notes reviewed.          LAB RESULTS  Recent Results (from the past 24 hour(s))   Duplex Venous Lower Extremity - Left CAR    Collection Time: 09/28/23  6:05 PM   Result Value Ref Range    Right Common Femoral Spont Y     Right Common Femoral Competent Y     Right Common Femoral Phasic Y     Right Common Femoral Compress C     Right Common Femoral Augment Y     Left Common Femoral Spont Y     Left Common Femoral Competent Y     Left Common Femoral Phasic Y     Left Common Femoral Compress C     Left Common Femoral Augment Y     Left Saphenofemoral Junction Compress C     Left Profunda Femoral Compress C     Left Proximal Femoral Compress C     Left Mid Femoral Spont Y     Left Mid Femoral Competent Y     Left Mid Femoral Phasic Y     Left Mid Femoral Compress C     Left Mid Femoral Augment Y     Left Distal Femoral Compress C     Left Popliteal Spont Y     Left Popliteal Competent Y     Left Popliteal Phasic Y     Left Popliteal Compress C     Left Popliteal Augment Y     Left Posterior Tibial Compress N     Left Peroneal Compress C     Left Gastronemius Compress C     Left Greater Saph AK Compress C     Left Greater Saph BK Compress C     Left Lesser Saph Compress N       CV VAS PRELIMINARY FINDINGS SCRIPTING 1.0     Left Posterior Tibial Vessel 1.0     Lt soleal vessel 1.0     Left Posterior Tibial Thrombus A     Lt soleal compress N     Lt soleal thrombus A     Left Lesser Saph Vessel 1.0     Left Lesser Saph Thrombus A    Comprehensive Metabolic Panel    Collection Time: 09/28/23  6:16 PM    Specimen: Blood   Result Value Ref Range    Glucose 142 (H) 65 - 99 mg/dL    BUN 11 6 - 20 mg/dL    Creatinine 0.85 0.76 - 1.27 mg/dL    Sodium 138 136 - 145 mmol/L    Potassium 3.7 3.5 - 5.2 mmol/L    Chloride 103 98 - 107 mmol/L    CO2 23.9 22.0 - 29.0 mmol/L    Calcium 9.4 8.6 - 10.5 mg/dL    Total Protein 7.0 6.0 - 8.5 g/dL    Albumin 4.5 3.5 - 5.2 g/dL    ALT (SGPT) 30 1 - 41 U/L    AST (SGOT) 22 1 - 40 U/L    Alkaline Phosphatase 90 39 - 117 U/L    Total Bilirubin 0.6 0.0 - 1.2 mg/dL    Globulin 2.5 gm/dL    A/G Ratio 1.8 g/dL    BUN/Creatinine Ratio 12.9 7.0 - 25.0    Anion Gap 11.1 5.0 - 15.0 mmol/L    eGFR 107.2 >60.0 mL/min/1.73   Single High Sensitivity Troponin T    Collection Time: 09/28/23  6:16 PM    Specimen: Blood   Result Value Ref Range    HS Troponin T 6 <15 ng/L   CBC Auto Differential    Collection Time: 09/28/23  6:16 PM    Specimen: Blood   Result Value Ref Range    WBC 9.03 3.40 - 10.80 10*3/mm3    RBC 5.51 4.14 - 5.80 10*6/mm3    Hemoglobin 17.1 13.0 - 17.7 g/dL    Hematocrit 49.5 37.5 - 51.0 %    MCV 89.8 79.0 - 97.0 fL    MCH 31.0 26.6 - 33.0 pg    MCHC 34.5 31.5 - 35.7 g/dL    RDW 12.6 12.3 - 15.4 %    RDW-SD 41.6 37.0 - 54.0 fl    MPV 10.8 6.0 - 12.0 fL    Platelets 246 140 - 450 10*3/mm3    Neutrophil % 60.3 42.7 - 76.0 %    Lymphocyte % 26.0 19.6 - 45.3 %    Monocyte % 9.5 5.0 - 12.0 %    Eosinophil % 3.0 0.3 - 6.2 %    Basophil % 0.9 0.0 - 1.5 %    Immature Grans % 0.3 0.0 - 0.5 %    Neutrophils, Absolute 5.44 1.70 - 7.00 10*3/mm3    Lymphocytes, Absolute 2.35 0.70 - 3.10 10*3/mm3    Monocytes, Absolute 0.86 0.10 - 0.90 10*3/mm3    Eosinophils, Absolute  0.27 0.00 - 0.40 10*3/mm3    Basophils, Absolute 0.08 0.00 - 0.20 10*3/mm3    Immature Grans, Absolute 0.03 0.00 - 0.05 10*3/mm3    nRBC 0.0 0.0 - 0.2 /100 WBC   Protime-INR    Collection Time: 09/28/23  6:16 PM    Specimen: Blood   Result Value Ref Range    Protime 13.7 11.7 - 14.2 Seconds    INR 1.03 0.90 - 1.10   aPTT    Collection Time: 09/28/23  6:16 PM    Specimen: Blood   Result Value Ref Range    PTT 24.8 22.7 - 35.4 seconds       Ordered the above labs and reviewed the results.        RADIOLOGY  CT Angiogram Chest Pulmonary Embolism    Result Date: 9/28/2023  CT ANGIOGRAM OF THE CHEST. MULTIPLE CORONAL, SAGITTAL, AND 3-D RECONSTRUCTIONS.  HISTORY: Leg pain, known DVT  TECHNIQUE: Radiation dose reduction techniques were utilized, including automated exposure control and exposure modulation based on body size. CT angiogram of the chest was performed. Multiple coronal, sagittal, and 3-D reconstruction images were obtained.  COMPARISON: None  FINDINGS:  1.6 cm left adrenal nodule is indeterminate. No hilar or axillary adenopathy is present by size criteria. Mildly enlarged intermediastinal node measures 1.1 cm cm in short axis dimension. There is no significant pericardial effusion. At least minimal to mild coronary artery calcification. No pulmonary consolidation, pleural effusion or pneumothorax.  There is concern for a filling defect within a subsegmental right upper lobe pulmonary artery (axial image 41 and coronal images 81 and 80).  No suspicious lytic or blastic osseous lesions. Compression deformity of the L1 vertebral body with partial ankylosis with the overlying T12 vertebral body has a chronic appearance.      1.  Apparent filling defect within a distal right subsegmental pulmonary artery of the right upper lobe concerning for small pulmonary embolus in the appropriate clinical context. 2.  Chronic appearing L1 compression deformity. 3.  Indeterminate left adrenal nodule. In the absence of  malignancy, findings are likely benign and can be followed with CT abdomen in 1 year to ensure stability if clinically indicated per ACR criteria. If patient has a history of malignancy, further evaluation with MRI of the abdomen is recommended. 4.  Mildly enlarged anterior mediastinal node measuring 1.1 cm. Follow-up chest CT in 3 months is recommended to ensure stability. 5.  Other findings as above.  This report was finalized on 9/28/2023 8:45 PM by Dr. Oli Galvan M.D.      Duplex Venous Lower Extremity - Left CAR    Result Date: 9/28/2023    Acute deep vein thrombosis involving the left posterior tibial and soleal veins.   Acute superficial vein thrombosis involving the left small saphenous vein.   All other left sided veins appeared normal.      Ordered the above noted radiological studies. Reviewed by me in PACS.              MEDICATIONS GIVEN IN ER  Medications   iopamidol (ISOVUE-370) 76 % injection 100 mL (95 mL Intravenous Given by Other 9/28/23 1927)               MEDICAL DECISION MAKING, PROGRESS, and CONSULTS    MDM: Patient presented emergency department with left calf pain, otherwise well-appearing, vitals otherwise stable.  Developed chest pain while in the emergency department, thinks it was just gas.  Ultrasound of the left leg demonstrates DVT, ordered CT PE protocol to evaluate for any pulmonary embolism as the cause of his left-sided chest pain.  CT PE demonstrates possible small pulmonary embolus in the right lung however no significant clot burden.  Patient otherwise meets criteria for outpatient management by [facility] criteria.  Discussed anticoagulation plan with cardiology, recommend stopping aspirin while patient is on Eliquis.  Given return precautions and discharged home.    All labs have been independently reviewed by me.  All radiology studies have been reviewed by me and I have also reviewed the radiology report.   EKG's independently viewed and interpreted by me.  Discussion below  represents my analysis of pertinent findings related to patient's condition, differential diagnosis, treatment plan and final disposition.      Additional sources:  - Discussed/ obtained information from independent historians: Obtained additional history from significant other who reports that patient has a history of SVT in the other leg.    - External (non-ED) record review:     - Chronic or social conditions impacting care: Coronary artery disease impacts patient's presentation    - Shared decision making: Discussed plan for anticoagulation and outpatient follow-up, patient understands this plan.      Orders placed during this visit:  Orders Placed This Encounter   Procedures    CT Angiogram Chest Pulmonary Embolism    Comprehensive Metabolic Panel    Single High Sensitivity Troponin T    Protime-INR    aPTT    CBC Auto Differential    Cardiology (on-call MD unless specified)    ECG 12 Lead Chest Pain    CBC & Differential         Additional orders considered but not ordered:  Considered additional ultrasounds of the lower extremities however will defer at this time.        Differential diagnosis includes but is not limited to:    DVT, myositis, calf pain, PE      Independent interpretation of labs, radiology studies, and discussions with consultants:  ED Course as of 09/28/23 2220   Thu Sep 28, 2023   1802 EKG interpreted myself:  1759, sinus rhythm rate of 81, no acute ST segment changes or T wave inversions. [FS]   1802 Preliminary DVT interpretation shows calf DVT on the left [FS]   1939 CT chest interpreted myself:  No large pulmonary embolism [FS]      ED Course User Index  [FS] Ramon Jackson MD           DIAGNOSIS  Final diagnoses:   Acute deep vein thrombosis (DVT) of calf muscle vein of left lower extremity         DISPOSITION  Discharged home        Latest Documented Vital Signs:  As of 22:20 EDT  BP- 131/86 HR- 89 Temp- 97.8 °F (36.6 °C) O2 sat- 96%              --    Please note that portions of  this were completed with a voice recognition program.       Note Disclaimer: At [facility], we believe that sharing information builds trust and better relationships. You are receiving this note because you are receiving care at [facility] or recently visited. It is possible you will see health information before a provider has talked with you about it. This kind of information can be easy to misunderstand. To help you fully understand what it means for your health, we urge you to discuss this note with your provider.             Ramon Jackson MD  09/28/23 8325

## 2023-09-29 NOTE — PROGRESS NOTES
[facility] Clinical Pharmacy Services: Pharmacy Education - Direct Oral Anticoagulant - Apixaban    Heber Harley is being discharged on apixaban for L calf DVT.    Counseling points included the following:  Apixaban's indication, patient's need for the medication, and dosing/frequency of this medication.  Explained possible side effects of anticoagulation therapy, including increased risk of bleeding, and s/sx of bleeding. Also talked about ways to control bleeding for minor cuts and scrapes.  Emphasized the importance of going to the emergency room if any of the following occur: Falling and hitting your head; noticing bright red blood in urine or dark/tarry stools; vomiting up blood or vomit has a coffee-ground like texture; coughing up blood.  Discussed the importance of informing any physician or dentist that they have been started on a DOAC, in case they need to be taken off for a procedure.  Discussed all important drug interactions, including over-the-counter medications and supplements.    Patient provided fist 30-days of medication (starter pack) prior to discharge from the ED.     Patient expressed understanding and had no further questions.      Wandy Trent, PharmD, BCPS, [facility]  Clinical Pharmacy Specialist, Emergency Medicine   Phone: [phone]

## 2023-09-29 NOTE — DISCHARGE INSTRUCTIONS
Please follow-up closely with your primary care doctor noted abnormalities on today's CT including slightly enlarged lymph node in the mediastinum and benign-appearing adrenal nodule.  Recommend following these up with your primary care physician.  You also may have a very small pulmonary embolism in the right subsegmental pulmonary artery, please return if you have any worsening chest pain or shortness of breath.  Please follow-up with your cardiologist to discuss aspirin therapy.

## 2024-01-30 ENCOUNTER — APPOINTMENT (OUTPATIENT)
Dept: CT IMAGING | Facility: HOSPITAL | Age: 49
End: 2024-01-30
Payer: COMMERCIAL

## 2024-01-30 ENCOUNTER — HOSPITAL ENCOUNTER (OUTPATIENT)
Facility: HOSPITAL | Age: 49
Setting detail: OBSERVATION
Discharge: HOME | End: 2024-02-01
Attending: EMERGENCY MEDICINE | Admitting: STUDENT IN AN ORGANIZED HEALTH CARE EDUCATION/TRAINING PROGRAM
Payer: COMMERCIAL

## 2024-01-30 DIAGNOSIS — K62.5 BRIGHT RED RECTAL BLEEDING: Primary | ICD-10-CM

## 2024-01-30 DIAGNOSIS — Z79.01 ANTICOAGULATED: ICD-10-CM

## 2024-01-30 LAB
ABO GROUP BLD: NORMAL
ALBUMIN SERPL-MCNC: 4.5 G/DL (ref 3.5–5.2)
ALBUMIN/GLOB SERPL: 2 G/DL
ALP SERPL-CCNC: 82 U/L (ref 39–117)
ALT SERPL W P-5'-P-CCNC: 45 U/L (ref 1–41)
ANION GAP SERPL CALCULATED.3IONS-SCNC: 13.2 MMOL/L (ref 5–15)
APTT PPP: 26.2 SECONDS (ref 22.7–35.4)
AST SERPL-CCNC: 30 U/L (ref 1–40)
BASOPHILS # BLD AUTO: 0.07 10*3/MM3 (ref 0–0.2)
BASOPHILS NFR BLD AUTO: 0.9 % (ref 0–1.5)
BILIRUB SERPL-MCNC: 0.5 MG/DL (ref 0–1.2)
BLD GP AB SCN SERPL QL: NEGATIVE
BUN SERPL-MCNC: 11 MG/DL (ref 6–20)
BUN/CREAT SERPL: 14.3 (ref 7–25)
CALCIUM SPEC-SCNC: 9.1 MG/DL (ref 8.6–10.5)
CHLORIDE SERPL-SCNC: 103 MMOL/L (ref 98–107)
CO2 SERPL-SCNC: 21.8 MMOL/L (ref 22–29)
CREAT SERPL-MCNC: 0.77 MG/DL (ref 0.76–1.27)
DEPRECATED RDW RBC AUTO: 39.8 FL (ref 37–54)
EGFRCR SERPLBLD CKD-EPI 2021: 110.4 ML/MIN/1.73
EOSINOPHIL # BLD AUTO: 0.32 10*3/MM3 (ref 0–0.4)
EOSINOPHIL NFR BLD AUTO: 4.3 % (ref 0.3–6.2)
ERYTHROCYTE [DISTWIDTH] IN BLOOD BY AUTOMATED COUNT: 12.2 % (ref 12.3–15.4)
GLOBULIN UR ELPH-MCNC: 2.2 GM/DL
GLUCOSE SERPL-MCNC: 123 MG/DL (ref 65–99)
HCT VFR BLD AUTO: 47 % (ref 37.5–51)
HGB BLD-MCNC: 15.6 G/DL (ref 13–17.7)
IMM GRANULOCYTES # BLD AUTO: 0.03 10*3/MM3 (ref 0–0.05)
IMM GRANULOCYTES NFR BLD AUTO: 0.4 % (ref 0–0.5)
INR PPP: 1.09 (ref 0.9–1.1)
LYMPHOCYTES # BLD AUTO: 2.25 10*3/MM3 (ref 0.7–3.1)
LYMPHOCYTES NFR BLD AUTO: 30.5 % (ref 19.6–45.3)
MCH RBC QN AUTO: 29.5 PG (ref 26.6–33)
MCHC RBC AUTO-ENTMCNC: 33.2 G/DL (ref 31.5–35.7)
MCV RBC AUTO: 88.8 FL (ref 79–97)
MONOCYTES # BLD AUTO: 0.85 10*3/MM3 (ref 0.1–0.9)
MONOCYTES NFR BLD AUTO: 11.5 % (ref 5–12)
NEUTROPHILS NFR BLD AUTO: 3.86 10*3/MM3 (ref 1.7–7)
NEUTROPHILS NFR BLD AUTO: 52.4 % (ref 42.7–76)
NRBC BLD AUTO-RTO: 0 /100 WBC (ref 0–0.2)
PLATELET # BLD AUTO: 223 10*3/MM3 (ref 140–450)
PMV BLD AUTO: 10.7 FL (ref 6–12)
POTASSIUM SERPL-SCNC: 3.8 MMOL/L (ref 3.5–5.2)
PROT SERPL-MCNC: 6.7 G/DL (ref 6–8.5)
PROTHROMBIN TIME: 14.3 SECONDS (ref 11.7–14.2)
RBC # BLD AUTO: 5.29 10*6/MM3 (ref 4.14–5.8)
RH BLD: POSITIVE
SODIUM SERPL-SCNC: 138 MMOL/L (ref 136–145)
T&S EXPIRATION DATE: NORMAL
WBC NRBC COR # BLD AUTO: 7.38 10*3/MM3 (ref 3.4–10.8)

## 2024-01-30 PROCEDURE — 99285 EMERGENCY DEPT VISIT HI MDM: CPT

## 2024-01-30 PROCEDURE — 25510000001 IOPAMIDOL 61 % SOLUTION: Performed by: EMERGENCY MEDICINE

## 2024-01-30 PROCEDURE — 85610 PROTHROMBIN TIME: CPT | Performed by: PHYSICIAN ASSISTANT

## 2024-01-30 PROCEDURE — 80053 COMPREHEN METABOLIC PANEL: CPT | Performed by: PHYSICIAN ASSISTANT

## 2024-01-30 PROCEDURE — 85025 COMPLETE CBC W/AUTO DIFF WBC: CPT | Performed by: PHYSICIAN ASSISTANT

## 2024-01-30 PROCEDURE — 86901 BLOOD TYPING SEROLOGIC RH(D): CPT | Performed by: PHYSICIAN ASSISTANT

## 2024-01-30 PROCEDURE — 86850 RBC ANTIBODY SCREEN: CPT | Performed by: PHYSICIAN ASSISTANT

## 2024-01-30 PROCEDURE — 85730 THROMBOPLASTIN TIME PARTIAL: CPT | Performed by: PHYSICIAN ASSISTANT

## 2024-01-30 PROCEDURE — 86900 BLOOD TYPING SEROLOGIC ABO: CPT | Performed by: PHYSICIAN ASSISTANT

## 2024-01-30 PROCEDURE — 74177 CT ABD & PELVIS W/CONTRAST: CPT

## 2024-01-30 RX ORDER — SODIUM CHLORIDE 0.9 % (FLUSH) 0.9 %
10 SYRINGE (ML) INJECTION EVERY 12 HOURS SCHEDULED
Status: DISCONTINUED | OUTPATIENT
Start: 2024-01-31 | End: 2024-02-01 | Stop reason: HOSPADM

## 2024-01-30 RX ORDER — ONDANSETRON 2 MG/ML
4 INJECTION INTRAMUSCULAR; INTRAVENOUS EVERY 6 HOURS PRN
Status: DISCONTINUED | OUTPATIENT
Start: 2024-01-30 | End: 2024-02-01 | Stop reason: HOSPADM

## 2024-01-30 RX ORDER — SODIUM CHLORIDE 0.9 % (FLUSH) 0.9 %
10 SYRINGE (ML) INJECTION AS NEEDED
Status: DISCONTINUED | OUTPATIENT
Start: 2024-01-30 | End: 2024-02-01 | Stop reason: HOSPADM

## 2024-01-30 RX ORDER — BISACODYL 5 MG/1
5 TABLET, DELAYED RELEASE ORAL DAILY PRN
Status: DISCONTINUED | OUTPATIENT
Start: 2024-01-30 | End: 2024-02-01 | Stop reason: HOSPADM

## 2024-01-30 RX ORDER — ONDANSETRON 4 MG/1
4 TABLET, ORALLY DISINTEGRATING ORAL EVERY 6 HOURS PRN
Status: DISCONTINUED | OUTPATIENT
Start: 2024-01-30 | End: 2024-02-01 | Stop reason: HOSPADM

## 2024-01-30 RX ORDER — BISACODYL 10 MG
10 SUPPOSITORY, RECTAL RECTAL DAILY PRN
Status: DISCONTINUED | OUTPATIENT
Start: 2024-01-30 | End: 2024-02-01 | Stop reason: HOSPADM

## 2024-01-30 RX ORDER — POLYETHYLENE GLYCOL 3350 17 G/17G
17 POWDER, FOR SOLUTION ORAL DAILY PRN
Status: DISCONTINUED | OUTPATIENT
Start: 2024-01-30 | End: 2024-02-01 | Stop reason: HOSPADM

## 2024-01-30 RX ORDER — NITROGLYCERIN 0.4 MG/1
0.4 TABLET SUBLINGUAL
Status: DISCONTINUED | OUTPATIENT
Start: 2024-01-30 | End: 2024-02-01 | Stop reason: HOSPADM

## 2024-01-30 RX ORDER — AMOXICILLIN 250 MG
2 CAPSULE ORAL 2 TIMES DAILY
Status: DISCONTINUED | OUTPATIENT
Start: 2024-01-31 | End: 2024-02-01 | Stop reason: HOSPADM

## 2024-01-30 RX ORDER — SODIUM CHLORIDE 9 MG/ML
40 INJECTION, SOLUTION INTRAVENOUS AS NEEDED
Status: DISCONTINUED | OUTPATIENT
Start: 2024-01-30 | End: 2024-02-01 | Stop reason: HOSPADM

## 2024-01-30 RX ADMIN — IOPAMIDOL 85 ML: 612 INJECTION, SOLUTION INTRAVENOUS at 22:17

## 2024-01-31 LAB
ANION GAP SERPL CALCULATED.3IONS-SCNC: 13.3 MMOL/L (ref 5–15)
BUN SERPL-MCNC: 12 MG/DL (ref 6–20)
BUN/CREAT SERPL: 15 (ref 7–25)
CALCIUM SPEC-SCNC: 9 MG/DL (ref 8.6–10.5)
CHLORIDE SERPL-SCNC: 104 MMOL/L (ref 98–107)
CO2 SERPL-SCNC: 23.7 MMOL/L (ref 22–29)
CREAT SERPL-MCNC: 0.8 MG/DL (ref 0.76–1.27)
DEPRECATED RDW RBC AUTO: 39 FL (ref 37–54)
EGFRCR SERPLBLD CKD-EPI 2021: 109.2 ML/MIN/1.73
ERYTHROCYTE [DISTWIDTH] IN BLOOD BY AUTOMATED COUNT: 12.2 % (ref 12.3–15.4)
GLUCOSE SERPL-MCNC: 101 MG/DL (ref 65–99)
HCT VFR BLD AUTO: 44.2 % (ref 37.5–51)
HCT VFR BLD AUTO: 50.1 % (ref 37.5–51)
HGB BLD-MCNC: 14.7 G/DL (ref 13–17.7)
HGB BLD-MCNC: 16.8 G/DL (ref 13–17.7)
MCH RBC QN AUTO: 29.5 PG (ref 26.6–33)
MCHC RBC AUTO-ENTMCNC: 33.3 G/DL (ref 31.5–35.7)
MCV RBC AUTO: 88.6 FL (ref 79–97)
PLATELET # BLD AUTO: 199 10*3/MM3 (ref 140–450)
PMV BLD AUTO: 10.9 FL (ref 6–12)
POTASSIUM SERPL-SCNC: 4 MMOL/L (ref 3.5–5.2)
RBC # BLD AUTO: 4.99 10*6/MM3 (ref 4.14–5.8)
SODIUM SERPL-SCNC: 141 MMOL/L (ref 136–145)
WBC NRBC COR # BLD AUTO: 6.97 10*3/MM3 (ref 3.4–10.8)

## 2024-01-31 PROCEDURE — 99204 OFFICE O/P NEW MOD 45 MIN: CPT | Performed by: INTERNAL MEDICINE

## 2024-01-31 PROCEDURE — 85027 COMPLETE CBC AUTOMATED: CPT

## 2024-01-31 PROCEDURE — 25810000003 LACTATED RINGERS PER 1000 ML

## 2024-01-31 PROCEDURE — G0378 HOSPITAL OBSERVATION PER HR: HCPCS

## 2024-01-31 PROCEDURE — 96361 HYDRATE IV INFUSION ADD-ON: CPT

## 2024-01-31 PROCEDURE — 85014 HEMATOCRIT: CPT | Performed by: STUDENT IN AN ORGANIZED HEALTH CARE EDUCATION/TRAINING PROGRAM

## 2024-01-31 PROCEDURE — 25810000003 LACTATED RINGERS PER 1000 ML: Performed by: STUDENT IN AN ORGANIZED HEALTH CARE EDUCATION/TRAINING PROGRAM

## 2024-01-31 PROCEDURE — 85018 HEMOGLOBIN: CPT | Performed by: STUDENT IN AN ORGANIZED HEALTH CARE EDUCATION/TRAINING PROGRAM

## 2024-01-31 PROCEDURE — 96360 HYDRATION IV INFUSION INIT: CPT

## 2024-01-31 PROCEDURE — 99214 OFFICE O/P EST MOD 30 MIN: CPT | Performed by: INTERNAL MEDICINE

## 2024-01-31 PROCEDURE — 80048 BASIC METABOLIC PNL TOTAL CA: CPT

## 2024-01-31 RX ORDER — SODIUM CHLORIDE, SODIUM LACTATE, POTASSIUM CHLORIDE, CALCIUM CHLORIDE 600; 310; 30; 20 MG/100ML; MG/100ML; MG/100ML; MG/100ML
100 INJECTION, SOLUTION INTRAVENOUS CONTINUOUS
Status: DISCONTINUED | OUTPATIENT
Start: 2024-01-31 | End: 2024-02-01

## 2024-01-31 RX ORDER — PANTOPRAZOLE SODIUM 40 MG/1
40 TABLET, DELAYED RELEASE ORAL 2 TIMES DAILY PRN
Status: DISCONTINUED | OUTPATIENT
Start: 2024-01-31 | End: 2024-01-31

## 2024-01-31 RX ORDER — ATORVASTATIN CALCIUM 20 MG/1
20 TABLET, FILM COATED ORAL DAILY
Status: DISCONTINUED | OUTPATIENT
Start: 2024-01-31 | End: 2024-02-01 | Stop reason: HOSPADM

## 2024-01-31 RX ORDER — CLOPIDOGREL BISULFATE 75 MG/1
75 TABLET ORAL DAILY
Status: DISCONTINUED | OUTPATIENT
Start: 2024-01-31 | End: 2024-01-31

## 2024-01-31 RX ORDER — LISINOPRIL 10 MG/1
10 TABLET ORAL DAILY
Status: DISCONTINUED | OUTPATIENT
Start: 2024-01-31 | End: 2024-02-01 | Stop reason: HOSPADM

## 2024-01-31 RX ORDER — METHOCARBAMOL 750 MG/1
750 TABLET, FILM COATED ORAL EVERY 8 HOURS PRN
Status: DISCONTINUED | OUTPATIENT
Start: 2024-01-31 | End: 2024-02-01 | Stop reason: HOSPADM

## 2024-01-31 RX ORDER — PANTOPRAZOLE SODIUM 40 MG/1
40 TABLET, DELAYED RELEASE ORAL
Status: DISCONTINUED | OUTPATIENT
Start: 2024-01-31 | End: 2024-02-01 | Stop reason: HOSPADM

## 2024-01-31 RX ORDER — HYDROCODONE BITARTRATE AND ACETAMINOPHEN 7.5; 325 MG/1; MG/1
1 TABLET ORAL EVERY 6 HOURS PRN
Status: DISCONTINUED | OUTPATIENT
Start: 2024-01-31 | End: 2024-02-01 | Stop reason: HOSPADM

## 2024-01-31 RX ORDER — METOPROLOL SUCCINATE 25 MG/1
25 TABLET, EXTENDED RELEASE ORAL EVERY MORNING
Status: DISCONTINUED | OUTPATIENT
Start: 2024-01-31 | End: 2024-02-01 | Stop reason: HOSPADM

## 2024-01-31 RX ADMIN — HYDROCODONE BITARTRATE AND ACETAMINOPHEN 1 TABLET: 7.5; 325 TABLET ORAL at 01:29

## 2024-01-31 RX ADMIN — Medication 10 ML: at 01:25

## 2024-01-31 RX ADMIN — LISINOPRIL 10 MG: 10 TABLET ORAL at 09:21

## 2024-01-31 RX ADMIN — Medication 10 ML: at 20:27

## 2024-01-31 RX ADMIN — HYDROCODONE BITARTRATE AND ACETAMINOPHEN 1 TABLET: 7.5; 325 TABLET ORAL at 12:17

## 2024-01-31 RX ADMIN — Medication 10 ML: at 09:21

## 2024-01-31 RX ADMIN — HYDROCODONE BITARTRATE AND ACETAMINOPHEN 1 TABLET: 7.5; 325 TABLET ORAL at 20:27

## 2024-01-31 RX ADMIN — SODIUM CHLORIDE, POTASSIUM CHLORIDE, SODIUM LACTATE AND CALCIUM CHLORIDE 100 ML/HR: 600; 310; 30; 20 INJECTION, SOLUTION INTRAVENOUS at 20:34

## 2024-01-31 RX ADMIN — METHOCARBAMOL TABLETS 750 MG: 750 TABLET, COATED ORAL at 16:46

## 2024-01-31 RX ADMIN — METOPROLOL SUCCINATE 25 MG: 25 TABLET, EXTENDED RELEASE ORAL at 06:46

## 2024-01-31 RX ADMIN — PANTOPRAZOLE SODIUM 40 MG: 40 TABLET, DELAYED RELEASE ORAL at 16:46

## 2024-01-31 RX ADMIN — SODIUM CHLORIDE, POTASSIUM CHLORIDE, SODIUM LACTATE AND CALCIUM CHLORIDE 100 ML/HR: 600; 310; 30; 20 INJECTION, SOLUTION INTRAVENOUS at 01:19

## 2024-01-31 NOTE — ED PROVIDER NOTES
EMERGENCY DEPARTMENT ENCOUNTER    Room Number:  02/02  Date of encounter:  1/31/2024  PCP: Deya Pack APRN  Historian: Patient, spouse  Chronic or social conditions impacting care (social determinants of health): Nothing    HPI:  Chief Complaint: Rectal bleeding, abdominal pain  A complete HPI/ROS/PMH/PSH/SH/FH are unobtainable due to: Nothing    Context: Heber Harley is a 48 y.o. male with a history of coronary artery disease, DVT, hemorrhoids.  He presents to the ED c/o acute rectal bleeding and left lower quadrant abdominal pain.  Patient states at 630 this evening he had an episode of rectal bleeding with a bowel movement.  He states the bleeding was enough to turn the water bright red.  He denies any rectal pain.  He has had some mild left lower quadrant abdominal pain for the past day or 2.  He denies any overt diarrhea, constipation, fever, chills.  He is on Eliquis and Plavix.  He denies any lightheadedness or dizziness.    Review of prior external notes (non-ED):   I reviewed primary care office visit from 12/4/2023.  Patient being followed for DVT, coronary artery disease.    Review of prior external test results outside of this encounter:  I reviewed a CMP from 9/8/2023.  Creatinine 0.85, potassium 3.7    PAST MEDICAL HISTORY  Active Ambulatory Problems     Diagnosis Date Noted    Grade III hemorrhoids 10/12/2021    Chronic back pain     Chronic right SI joint pain     Essential hypertension 06/29/2022    Precordial pain 06/29/2022    HLD (hyperlipidemia) 07/20/2022    Chest pain 07/20/2022    Elevated troponin 07/20/2022    EVAN (obstructive sleep apnea) 07/20/2022    Obesity (BMI 30-39.9) 07/20/2022    Wasp sting-induced anaphylaxis 07/21/2022    Leukocytosis 07/21/2022    Hyperglycemia, drug-induced 07/21/2022    NSTEMI (non-ST elevated myocardial infarction) 07/22/2022    Coronary artery disease involving native coronary artery of native heart without angina pectoris 08/03/2022    Status  post insertion of drug eluting coronary artery stent 03/20/2023     Resolved Ambulatory Problems     Diagnosis Date Noted    No Resolved Ambulatory Problems     Past Medical History:   Diagnosis Date    GERD (gastroesophageal reflux disease)     Hyperlipidemia     Hypertension     Hypertriglyceridemia     MI, old     Sleep apnea     Venous embolism and thrombosis of superficial vessels of lower extremity          PAST SURGICAL HISTORY  Past Surgical History:   Procedure Laterality Date    ANKLE SURGERY Right     ORIF    CARDIAC CATHETERIZATION Left 7/21/2022    Procedure: Coronary Angiography;  Surgeon: Mayito Levy MD;  Location: [facility] CATH INVASIVE LOCATION;  Service: Cardiovascular;  Laterality: Left;    CARDIAC CATHETERIZATION N/A 7/21/2022    Procedure: Left Heart Cath;  Surgeon: Mayito Levy MD;  Location: [facility] CATH INVASIVE LOCATION;  Service: Cardiovascular;  Laterality: N/A;    CARDIAC CATHETERIZATION N/A 7/21/2022    Procedure: Stent SID coronary;  Surgeon: Mayito Levy MD;  Location: [facility] CATH INVASIVE LOCATION;  Service: Cardiovascular;  Laterality: N/A;    CARDIAC CATHETERIZATION N/A 7/21/2022    Procedure: Left ventriculography;  Surgeon: Mayito Levy MD;  Location: [facility] INVASIVE LOCATION;  Service: Cardiovascular;  Laterality: N/A;    HEMORRHOIDECTOMY N/A 11/3/2021    Procedure: HEMORRHOID BANDING;  Surgeon: Matthew Coronel MD;  Location: [facility] OR [facility] [place];  Service: General;  Laterality: N/A;         FAMILY HISTORY  Family History   Problem Relation Age of Onset    Hypertension Mother     Coronary artery disease Mother         Mother with stent    Heart disease Father         Father with MI and 3 vessel CABG in upper 50's    Hypertension Father     Clotting disorder Father     Cancer Paternal Grandfather     Diabetes Maternal Uncle     Cancer Maternal Uncle     Malig Hyperthermia Neg Hx          SOCIAL HISTORY  Social History     Socioeconomic History    Marital status:     Tobacco Use    Smoking status: Former     Years: 5     Types: Cigarettes     Quit date:      Years since quittin.0    Smokeless tobacco: Current    Tobacco comments:     Caffeine - 2 daily   Vaping Use    Vaping Use: Never used   Substance and Sexual Activity    Alcohol use: Yes     Alcohol/week: 12.0 standard drinks of alcohol     Types: 12 Cans of beer per week    Drug use: Yes     Frequency: 7.0 times per week     Types: Marijuana     Comment: daily/PAIN MGMT RX NARC CH BACK PAIN    Sexual activity: Defer         ALLERGIES  Wasp venom and Wellbutrin [bupropion]        REVIEW OF SYSTEMS  All systems reviewed and negative except for those discussed in HPI.       PHYSICAL EXAM    I have reviewed the triage vital signs and nursing notes.    ED Triage Vitals   Temp Heart Rate Resp BP SpO2   24   96.4 °F (35.8 °C) 95 18 111/83 97 %      Temp src Heart Rate Source Patient Position BP Location FiO2 (%)   -- -- -- -- --              Physical Exam  GENERAL: Alert, oriented, not distressed  HENT: head atraumatic, no nuchal rigidity  EYES: no scleral icterus, EOMI  CV: regular rhythm, regular rate, no murmur  RESPIRATORY: normal effort, CTA  ABDOMEN: soft, mild left lower quadrant abdominal tenderness without guarding or rebound  : No fissure, hemorrhoid  MUSCULOSKELETAL: no deformity, FROM, no calf swelling or tenderness  NEURO: alert, moves all extremities, follows commands  SKIN: warm, dry        LAB RESULTS  Recent Results (from the past 24 hour(s))   Comprehensive Metabolic Panel    Collection Time: 24  9:35 PM    Specimen: Blood   Result Value Ref Range    Glucose 123 (H) 65 - 99 mg/dL    BUN 11 6 - 20 mg/dL    Creatinine 0.77 0.76 - 1.27 mg/dL    Sodium 138 136 - 145 mmol/L    Potassium 3.8 3.5 - 5.2 mmol/L    Chloride 103 98 - 107 mmol/L    CO2 21.8 (L) 22.0 - 29.0 mmol/L    Calcium 9.1 8.6 - 10.5 mg/dL    Total Protein 6.7  6.0 - 8.5 g/dL    Albumin 4.5 3.5 - 5.2 g/dL    ALT (SGPT) 45 (H) 1 - 41 U/L    AST (SGOT) 30 1 - 40 U/L    Alkaline Phosphatase 82 39 - 117 U/L    Total Bilirubin 0.5 0.0 - 1.2 mg/dL    Globulin 2.2 gm/dL    A/G Ratio 2.0 g/dL    BUN/Creatinine Ratio 14.3 7.0 - 25.0    Anion Gap 13.2 5.0 - 15.0 mmol/L    eGFR 110.4 >60.0 mL/min/1.73   Protime-INR    Collection Time: 01/30/24  9:35 PM    Specimen: Blood   Result Value Ref Range    Protime 14.3 (H) 11.7 - 14.2 Seconds    INR 1.09 0.90 - 1.10   aPTT    Collection Time: 01/30/24  9:35 PM    Specimen: Blood   Result Value Ref Range    PTT 26.2 22.7 - 35.4 seconds   Type & Screen    Collection Time: 01/30/24  9:35 PM    Specimen: Blood   Result Value Ref Range    ABO Type A     RH type Positive     Antibody Screen Negative     T&S Expiration Date 2/2/2024 11:59:59 PM    CBC Auto Differential    Collection Time: 01/30/24  9:35 PM    Specimen: Blood   Result Value Ref Range    WBC 7.38 3.40 - 10.80 10*3/mm3    RBC 5.29 4.14 - 5.80 10*6/mm3    Hemoglobin 15.6 13.0 - 17.7 g/dL    Hematocrit 47.0 37.5 - 51.0 %    MCV 88.8 79.0 - 97.0 fL    MCH 29.5 26.6 - 33.0 pg    MCHC 33.2 31.5 - 35.7 g/dL    RDW 12.2 (L) 12.3 - 15.4 %    RDW-SD 39.8 37.0 - 54.0 fl    MPV 10.7 6.0 - 12.0 fL    Platelets 223 140 - 450 10*3/mm3    Neutrophil % 52.4 42.7 - 76.0 %    Lymphocyte % 30.5 19.6 - 45.3 %    Monocyte % 11.5 5.0 - 12.0 %    Eosinophil % 4.3 0.3 - 6.2 %    Basophil % 0.9 0.0 - 1.5 %    Immature Grans % 0.4 0.0 - 0.5 %    Neutrophils, Absolute 3.86 1.70 - 7.00 10*3/mm3    Lymphocytes, Absolute 2.25 0.70 - 3.10 10*3/mm3    Monocytes, Absolute 0.85 0.10 - 0.90 10*3/mm3    Eosinophils, Absolute 0.32 0.00 - 0.40 10*3/mm3    Basophils, Absolute 0.07 0.00 - 0.20 10*3/mm3    Immature Grans, Absolute 0.03 0.00 - 0.05 10*3/mm3    nRBC 0.0 0.0 - 0.2 /100 WBC       Ordered the above labs and independently reviewed the results.        RADIOLOGY  CT Abdomen Pelvis With Contrast    Result Date:  1/30/2024  CT of the abdomen and pelvis with contrast 1/30/2024  HISTORY: Left lower quadrant pain. Rectal bleeding.  Axial images were obtained from the lung bases to the symphysis pubis after intravenous contrast. No oral contrast was given.  There is fatty infiltration of the liver. Gallbladder is contracted. The spleen, pancreas, right adrenal gland and kidneys appear unremarkable. A tiny left adrenal nodule is seen most likely a small adrenal adenoma. There is some aortoiliac calcification.  The colon is collapsed. No bowel wall thickening or bowel dilatation is seen. Urinary bladder and prostate gland appear normal.  No masses or abnormal fluid collections are seen.      1. Fatty infiltration of the liver. 2. Small left adrenal nodule probably an adrenal adenoma. 3. No acute abnormalities are seen in the bowel.  Radiation dose reduction techniques were utilized, including automated exposure control and exposure modulation based on body size.        I ordered the above noted radiological studies. Reviewed by me and discussed with radiologist.  See dictation for official radiology interpretation.      MEDICATIONS GIVEN IN ER    Medications   sodium chloride 0.9 % flush 10 mL (has no administration in time range)   sodium chloride 0.9 % flush 10 mL (has no administration in time range)   sodium chloride 0.9 % flush 10 mL (has no administration in time range)   sodium chloride 0.9 % infusion 40 mL (has no administration in time range)   sennosides-docusate (PERICOLACE) 8.6-50 MG per tablet 2 tablet (has no administration in time range)     And   polyethylene glycol (MIRALAX) packet 17 g (has no administration in time range)     And   bisacodyl (DULCOLAX) EC tablet 5 mg (has no administration in time range)     And   bisacodyl (DULCOLAX) suppository 10 mg (has no administration in time range)   ondansetron ODT (ZOFRAN-ODT) disintegrating tablet 4 mg (has no administration in time range)     Or   ondansetron  (ZOFRAN) injection 4 mg (has no administration in time range)   nitroglycerin (NITROSTAT) SL tablet 0.4 mg (has no administration in time range)   iopamidol (ISOVUE-300) 61 % injection 85 mL (85 mL Intravenous Given 1/30/24 2217)         ADDITIONAL ORDERS CONSIDERED BUT NOT ORDERED:  Nothing      PROGRESS, DATA ANALYSIS, CONSULTS, AND MEDICAL DECISION MAKING    All labs have been independently interpreted by myself.  All radiology studies have been independently interpreted by myself and discussed with radiologist dictating the report.   EKG's independently interpreted by myself.  Discussion below represents my analysis of pertinent findings related to patient's condition, differential diagnosis, treatment plan and final disposition.    I have discussed case with Dr. Reeder, emergency room physician.  He has performed his own bedside examination and agrees with treatment plan.    ED Course as of 01/31/24 0012 Tue Jan 30, 2024 2133 The patient presents with bright red rectal bleeding earlier this evening.  Patient is on Plavix and Eliquis.  No lightheadedness.  Stable vitals.  He does complain of left lower quadrant abdominal pain.  Differential diagnoses include but not limited to diverticulitis, hemorrhoids, fissure. [EE]   2149 Hemoglobin: 15.6 [EE]   2206 Creatinine: 0.77 [EE]   2206 BUN: 11 [EE]   2353 Patient is hemodynamically stable here.  Has not had any further bowel movements.  Since patient is on Plavix and Eliquis, prefer to watch him overnight with serial hemoglobins and GI consult.  The patient/family is agreeable to this.  We will admit.    I discussed this with Latesha Lujan, nurse practitioner in the observation unit.  She agrees to admit. [EE]      ED Course User Index  [EE] Tone Randolph PA       AS OF 00:12 EST VITALS:    BP - 136/93  HR - 78  TEMP - 96.4 °F (35.8 °C)  O2 SATS - 96%        DIAGNOSIS  Final diagnoses:   Bright red rectal bleeding   Anticoagulated          DISPOSITION  Admitted      Dictated utilizing Mark dictation     Tone Randolph PA  01/31/24 0012

## 2024-01-31 NOTE — PLAN OF CARE
Problem: Adult Inpatient Plan of Care  Goal: Plan of Care Review  Outcome: Ongoing, Progressing  Flowsheets (Taken 1/31/2024 0610)  Progress: improving  Plan of Care Reviewed With: patient  Outcome Evaluation: Patient is a48 year old male admitted to the observation unit for rectal bleed the started last night 6.30pm. There has been no bowel movement since patient got admitted. Patient is on Eliquis and Plavix for DVT and cardiac stent. Type and screen done. Treated patient's chronic back pain with norco 7.5 mg. Cardiac monitoring. NPO. LR infusing at 100ml/hr. GI consulted.  Goal: Patient-Specific Goal (Individualized)  Outcome: Ongoing, Progressing  Goal: Absence of Hospital-Acquired Illness or Injury  Outcome: Ongoing, Progressing  Intervention: Identify and Manage Fall Risk  Recent Flowsheet Documentation  Taken 1/31/2024 0600 by Jinny Theodore RN  Safety Promotion/Fall Prevention:   assistive device/personal items within reach   clutter free environment maintained   fall prevention program maintained   lighting adjusted   nonskid shoes/slippers when out of bed   safety round/check completed  Taken 1/31/2024 0400 by Jinny Theodore RN  Safety Promotion/Fall Prevention:   assistive device/personal items within reach   clutter free environment maintained   fall prevention program maintained   lighting adjusted   nonskid shoes/slippers when out of bed   room organization consistent   safety round/check completed  Taken 1/31/2024 0200 by Jinny Theodore, JEM  Safety Promotion/Fall Prevention:   assistive device/personal items within reach   clutter free environment maintained   fall prevention program maintained   lighting adjusted   muscle strengthening facilitated   room organization consistent   safety round/check completed  Taken 1/31/2024 0111 by Jinny Theodore RN  Safety Promotion/Fall Prevention:   assistive device/personal items within reach   clutter free environment maintained   fall prevention program  maintained   lighting adjusted   nonskid shoes/slippers when out of bed   room organization consistent   safety round/check completed  Intervention: Prevent Skin Injury  Recent Flowsheet Documentation  Taken 1/31/2024 0600 by Jinny Theodore RN  Body Position: position changed independently  Taken 1/31/2024 0400 by Jinny Theodore RN  Body Position: position changed independently  Taken 1/31/2024 0200 by Jinny Theodore RN  Body Position: position changed independently  Taken 1/31/2024 0111 by Jinny Theodore RN  Body Position: position changed independently  Intervention: Prevent and Manage VTE (Venous Thromboembolism) Risk  Recent Flowsheet Documentation  Taken 1/31/2024 0600 by Jinny Theodore RN  Activity Management: activity minimized  Taken 1/31/2024 0400 by Jinny Theodore RN  Activity Management: activity minimized  Taken 1/31/2024 0200 by Jinny Theodore RN  Activity Management: activity minimized  Taken 1/31/2024 0111 by Jinny Theodore RN  Activity Management: activity encouraged  Range of Motion: active ROM (range of motion) encouraged  Intervention: Prevent Infection  Recent Flowsheet Documentation  Taken 1/31/2024 0600 by Jinny Theodore RN  Infection Prevention:   hand hygiene promoted   rest/sleep promoted   single patient room provided  Taken 1/31/2024 0400 by Jinny Theodore RN  Infection Prevention:   hand hygiene promoted   rest/sleep promoted   single patient room provided  Taken 1/31/2024 0200 by Jinny Theodore RN  Infection Prevention:   hand hygiene promoted   rest/sleep promoted   single patient room provided  Taken 1/31/2024 0111 by Jinny Theodore RN  Infection Prevention:   hand hygiene promoted   rest/sleep promoted   single patient room provided  Goal: Optimal Comfort and Wellbeing  Outcome: Ongoing, Progressing  Intervention: Monitor Pain and Promote Comfort  Recent Flowsheet Documentation  Taken 1/31/2024 0129 by Jinny Theodore RN  Pain Management Interventions: see MAR  Intervention: Provide  Person-Centered Care  Recent Flowsheet Documentation  Taken 1/31/2024 0111 by Jinny Theodore RN  Trust Relationship/Rapport:   care explained   choices provided   questions answered   questions encouraged  Goal: Readiness for Transition of Care  Outcome: Ongoing, Progressing  Intervention: Mutually Develop Transition Plan  Recent Flowsheet Documentation  Taken 1/31/2024 0111 by Jinny Theodore RN  Transportation Anticipated: family or friend will provide  Patient/Family Anticipated Services at Transition: none  Patient/Family Anticipates Transition to: home with family  Taken 1/31/2024 0109 by Jinny Theodore RN  Equipment Currently Used at Home: none     Problem: Pain Acute  Goal: Acceptable Pain Control and Functional Ability  Outcome: Ongoing, Progressing  Intervention: Develop Pain Management Plan  Recent Flowsheet Documentation  Taken 1/31/2024 0129 by Jinny Theodore RN  Pain Management Interventions: see MAR  Intervention: Optimize Psychosocial Wellbeing  Recent Flowsheet Documentation  Taken 1/31/2024 0111 by Jinny Theodore RN  Supportive Measures: active listening utilized  Diversional Activities:   tablet   smartphone     Problem: Diarrhea  Goal: Fluid and Electrolyte Balance  Outcome: Ongoing, Progressing   Goal Outcome Evaluation:  Plan of Care Reviewed With: patient        Progress: improving  Outcome Evaluation: Patient is a48 year old male admitted to the observation unit for rectal bleed the started last night 6.30pm. There has been no bowel movement since patient got admitted. Patient is on Eliquis and Plavix for DVT and cardiac stent. Type and screen done. Treated patient's chronic back pain with norco 7.5 mg. Cardiac monitoring. NPO. LR infusing at 100ml/hr. GI consulted.

## 2024-01-31 NOTE — PROGRESS NOTES
Name: Heber Harley ADMIT: 2024   : 1975  PCP: Deya Pack APRN    MRN: [id number] LOS: 0 days   AGE/SEX: 48 y.o. male  ROOM: [facility]/     Subjective   Subjective     48-year-old on Plavix after stent in , on Eliquis after diagnosis of the left lower extremity DVT in 2023 presenting with bright red blood per rectum that started around 6 PM last night.  Patient was initially admitted to the observation unit, however as GI cannot do a scope for 5 days off the Plavix will admit to the internal medicine service.  No further bright red blood or any stools at all since yesterday evening.  No nausea or vomiting.  Family at bedside.    Review of Systems   Constitutional:  Negative for chills and fever.   Respiratory:  Negative for cough and shortness of breath.    Cardiovascular:  Negative for chest pain and palpitations.   Gastrointestinal:  Positive for blood in stool. Negative for abdominal pain, diarrhea, nausea and vomiting.     As above     Objective   Objective   Vital Signs  Temp:  [96.4 °F (35.8 °C)-97.9 °F (36.6 °C)] 97.5 °F (36.4 °C)  Heart Rate:  [62-95] 63  Resp:  [16-18] 16  BP: (111-144)/() 128/84  SpO2:  [93 %-99 %] 99 %  on   ;   Device (Oxygen Therapy): room air  Body mass index is 36.65 kg/m².  Physical Exam  Vitals and nursing note reviewed.   Constitutional:       General: He is not in acute distress.  Cardiovascular:      Rate and Rhythm: Normal rate and regular rhythm.   Pulmonary:      Effort: Pulmonary effort is normal. No respiratory distress.   Abdominal:      General: Abdomen is flat. There is no distension.      Tenderness: There is no abdominal tenderness.   Musculoskeletal:         General: No swelling or deformity.   Skin:     General: Skin is warm and dry.   Neurological:      General: No focal deficit present.      Mental Status: He is alert. Mental status is at baseline.         Results Review     I reviewed the patient's new clinical  "results.  Results from last 7 days   Lab Units 01/31/24  0338 01/30/24  2135   WBC 10*3/mm3 6.97 7.38   HEMOGLOBIN g/dL 14.7 15.6   PLATELETS 10*3/mm3 199 223     Results from last 7 days   Lab Units 01/31/24  0338 01/30/24  2135   SODIUM mmol/L 141 138   POTASSIUM mmol/L 4.0 3.8   CHLORIDE mmol/L 104 103   CO2 mmol/L 23.7 21.8*   BUN mg/dL 12 11   CREATININE mg/dL 0.80 0.77   GLUCOSE mg/dL 101* 123*   Estimated Creatinine Clearance: 144.1 mL/min (by C-G formula based on SCr of 0.8 mg/dL).  Results from last 7 days   Lab Units 01/30/24  2135   ALBUMIN g/dL 4.5   BILIRUBIN mg/dL 0.5   ALK PHOS U/L 82   AST (SGOT) U/L 30   ALT (SGPT) U/L 45*     Results from last 7 days   Lab Units 01/31/24  0338 01/30/24  2135   CALCIUM mg/dL 9.0 9.1   ALBUMIN g/dL  --  4.5       COVID19   Date Value Ref Range Status   07/20/2022 Not Detected Not Detected - Ref. Range Final     No results found for: \"HGBA1C\", \"POCGLU\"    CT Abdomen Pelvis With Contrast  Narrative: CT of the abdomen and pelvis with contrast 1/30/2024     HISTORY: Left lower quadrant pain. Rectal bleeding.     Axial images were obtained from the lung bases to the symphysis pubis  after intravenous contrast. No oral contrast was given.     There is fatty infiltration of the liver. Gallbladder is contracted. The  spleen, pancreas, right adrenal gland and kidneys appear unremarkable. A  tiny left adrenal nodule is seen most likely a small adrenal adenoma.  There is some aortoiliac calcification.     The colon is collapsed. No bowel wall thickening or bowel dilatation is  seen. Urinary bladder and prostate gland appear normal.     No masses or abnormal fluid collections are seen.     Impression: 1. Fatty infiltration of the liver.  2. Small left adrenal nodule probably an adrenal adenoma.  3. No acute abnormalities are seen in the bowel.     Radiation dose reduction techniques were utilized, including automated  exposure control and exposure modulation based on body " size.          I reviewed the patient's daily medications.  Scheduled Medications  atorvastatin, 20 mg, Oral, Daily  lisinopril, 10 mg, Oral, Daily  metoprolol succinate XL, 25 mg, Oral, QAM  pantoprazole, 40 mg, Oral, BID AC  senna-docusate sodium, 2 tablet, Oral, BID  sodium chloride, 10 mL, Intravenous, Q12H  Vortioxetine HBr, 10 mg, Oral, Daily    Infusions  lactated ringers, 100 mL/hr, Last Rate: 100 mL/hr (01/31/24 0119)    Diet  Diet: Regular/House Diet; Texture: Regular Texture (IDDSI 7); Fluid Consistency: Thin (IDDSI 0)         I have personally reviewed:  [x]  Laboratory   []  Microbiology   [x]  Radiology   [x]  EKG/Telemetry   []  Cardiology/Vascular   []  Pathology   [x]  Records     Assessment/Plan     Active Hospital Problems    Diagnosis  POA    **Rectal bleeding [K62.5]  Yes    Status post insertion of drug eluting coronary artery stent [Z95.5]  Not Applicable    Coronary artery disease involving native coronary artery of native heart without angina pectoris [I25.10]  Yes    Obesity (BMI 30-39.9) [E66.9]  Yes    HLD (hyperlipidemia) [E78.5]  Yes    Essential hypertension [I10]  Yes    Grade III hemorrhoids [K64.2]  Yes      Resolved Hospital Problems   No resolved problems to display.       48 y.o. male admitted with Rectal bleeding.    Rectal bleeding  -Trend hemoglobin  -GI consulted-recommend doing an EGD and colonoscopy on 2/5/2020 5:24 days off Plavix and 2 days of Eliquis.  -Cardiology consulted, okay with holding Plavix indefinitely.  Baby aspirin for antiplatelet therapy when able    CAD status post stent in July 2022  Chronically occluded mid LAD  Hypertension  -Cardiology consulted.  Okay with baby aspirin after procedures.  Continue home lisinopril, metoprolol, atorvastatin-    GERD-continue home PPI    Lower extremity DVT diagnosed in September 2024  -Holding Eliquis for now.  Was only taking Eliquis 5 mg daily at home.    Depression-continue home Trintellix    Small left adrenal  nodule, possible adenoma  -Further workup as an outpatient      SCDs for DVT prophylaxis.  Full code.  Discussed with patient, family, nursing staff, and ED provider.  Anticipate discharge home with family when cleared by consultants.    Expected discharge date/ time has not been documented.      Tereso Elliott MD  [facility]  01/31/24  15:16 EST

## 2024-01-31 NOTE — PLAN OF CARE
Goal Outcome Evaluation:      Pt to transfer to [address] [place] [address]. Plan for EGD and colonoscopy on Monday.

## 2024-01-31 NOTE — ED NOTES
Nursing report ED to floor  Heber Harley  48 y.o.  male    HPI :   Chief Complaint   Patient presents with    Rectal Bleeding       Admitting doctor:   Amberly Millan MD    Admitting diagnosis:   The primary encounter diagnosis was Bright red rectal bleeding. A diagnosis of Anticoagulated was also pertinent to this visit.    Code status:   Current Code Status       Date Active Code Status Order ID Comments User Context       1/30/2024 8945 CPR (Attempt to Resuscitate) [id number]  Latesha Lujan APRN ED        Question Answer    Code Status (Patient has no pulse and is not breathing) CPR (Attempt to Resuscitate)    Medical Interventions (Patient has pulse or is breathing) Full Support                    Allergies:   Wasp venom and Wellbutrin [bupropion]    Isolation:   No active isolations    Intake and Output  No intake or output data in the 24 hours ending 01/31/24 0020    Weight:       01/30/24 2057   Weight: 113 kg (250 lb)       Most recent vitals:   Vitals:    01/31/24 0001 01/31/24 0002 01/31/24 0017 01/31/24 0018   BP: 121/98      Pulse: 73 78 76 71   Resp:       Temp:       SpO2: 97% 95% 95% 95%   Weight:       Height:           Active LDAs/IV Access:   Lines, Drains & Airways       Active LDAs       Name Placement date Placement time Site Days    Peripheral IV 01/30/24 2141 Posterior;Right Hand 01/30/24 2141  Hand  less than 1                    Labs (abnormal labs have a star):   Labs Reviewed   COMPREHENSIVE METABOLIC PANEL - Abnormal; Notable for the following components:       Result Value    Glucose 123 (*)     CO2 21.8 (*)     ALT (SGPT) 45 (*)     All other components within normal limits    Narrative:     GFR Normal >60  Chronic Kidney Disease <60  Kidney Failure <15     PROTIME-INR - Abnormal; Notable for the following components:    Protime 14.3 (*)     All other components within normal limits   CBC WITH AUTO DIFFERENTIAL - Abnormal; Notable for the following components:    RDW 12.2 (*)     All  other components within normal limits   APTT - Normal   CBC (NO DIFF)   BASIC METABOLIC PANEL   TYPE AND SCREEN   CBC AND DIFFERENTIAL    Narrative:     The following orders were created for panel order CBC & Differential.  Procedure                               Abnormality         Status                     ---------                               -----------         ------                     CBC Auto Differential[[id number]]        Abnormal            Final result                 Please view results for these tests on the individual orders.       EKG:   No orders to display       Meds given in ED:   Medications   sodium chloride 0.9 % flush 10 mL (has no administration in time range)   sodium chloride 0.9 % flush 10 mL (has no administration in time range)   sodium chloride 0.9 % flush 10 mL (has no administration in time range)   sodium chloride 0.9 % infusion 40 mL (has no administration in time range)   sennosides-docusate (PERICOLACE) 8.6-50 MG per tablet 2 tablet (has no administration in time range)     And   polyethylene glycol (MIRALAX) packet 17 g (has no administration in time range)     And   bisacodyl (DULCOLAX) EC tablet 5 mg (has no administration in time range)     And   bisacodyl (DULCOLAX) suppository 10 mg (has no administration in time range)   ondansetron ODT (ZOFRAN-ODT) disintegrating tablet 4 mg (has no administration in time range)     Or   ondansetron (ZOFRAN) injection 4 mg (has no administration in time range)   nitroglycerin (NITROSTAT) SL tablet 0.4 mg (has no administration in time range)   iopamidol (ISOVUE-300) 61 % injection 85 mL (85 mL Intravenous Given 1/30/24 2213)       Imaging results:  CT Abdomen Pelvis With Contrast    Result Date: 1/30/2024  1. Fatty infiltration of the liver. 2. Small left adrenal nodule probably an adrenal adenoma. 3. No acute abnormalities are seen in the bowel.  Radiation dose reduction techniques were utilized, including automated exposure control and  exposure modulation based on body size.        Ambulatory status:   ax1    Social issues:   Social History     Socioeconomic History    Marital status:    Tobacco Use    Smoking status: Former     Years: 5     Types: Cigarettes     Quit date:      Years since quittin.0    Smokeless tobacco: Current    Tobacco comments:     Caffeine - 2 daily   Vaping Use    Vaping Use: Never used   Substance and Sexual Activity    Alcohol use: Yes     Alcohol/week: 12.0 standard drinks of alcohol     Types: 12 Cans of beer per week    Drug use: Yes     Frequency: 7.0 times per week     Types: Marijuana     Comment: daily/PAIN MGMT RX NARC CH BACK PAIN    Sexual activity: Defer       NIH Stroke Scale:         Registered Nurse, RN  24 00:20 EST

## 2024-01-31 NOTE — CONSULTS
[facility]  Initial Inpatient Consult Note    Referring Provider: Dr. Lito Holland    Reason for Consultation: GI bleed    Subjective     History of present illness:    48 y.o. male with GERD, HTN, sleep apnea, h/o an MI in the past (coronary stent placed in 2022), DVT (on Eliquis and Plavix - last doses were the AM of 1/30/24) and who has intermittent diarrhea normally. He was admitted 1/30/24 with bright red blood per rectum.  He has never had GI bleeding in the past.  He does have a lot of heartburn.  His weight is stable.  He denies nonsteroidal anti-inflammatory drug use.  No constipation.  No abdominal pain or chest pain.  He is a non-smoker but does dip tobacco.  He occasionally drinks alcohol.  He is .  He has 2 children and he works in construction.  He has never had a colonoscopy or an EGD.  He did have a CT of the abdomen and pelvis with IV contrast yesterday that showed:  IMPRESSION:  1. Fatty infiltration of the liver.  2. Small left adrenal nodule probably an adrenal adenoma.  3. No acute abnormalities are seen in the bowel.        Yesterday in the emergency room his hemoglobin was 15.6, hematocrit of 47.  Today his hemoglobin was 14.7.  Today his creatinine is 0.8 with a BUN of 12.    Past Medical History:  Past Medical History:   Diagnosis Date    Chronic back pain     COMMON WEALTH PN MGMT [place], [facility]ARC RX    GERD (gastroesophageal reflux disease)     Hyperlipidemia     Hypertension     Hypertriglyceridemia     MI, old     Sleep apnea     On CPAP    Venous embolism and thrombosis of superficial vessels of lower extremity      Past Surgical History:  Past Surgical History:   Procedure Laterality Date    ANKLE SURGERY Right     ORIF    CARDIAC CATHETERIZATION Left 7/21/2022    Procedure: Coronary Angiography;  Surgeon: Mayito Levy MD;  Location: [facility] CATH INVASIVE LOCATION;  Service: Cardiovascular;  Laterality: Left;    CARDIAC CATHETERIZATION N/A 7/21/2022     Procedure: Left Heart Cath;  Surgeon: Mayito Levy MD;  Location:  [facility] CATH INVASIVE LOCATION;  Service: Cardiovascular;  Laterality: N/A;    CARDIAC CATHETERIZATION N/A 2022    Procedure: Stent SID coronary;  Surgeon: Mayito Levy MD;  Location:  [facility] CATH INVASIVE LOCATION;  Service: Cardiovascular;  Laterality: N/A;    CARDIAC CATHETERIZATION N/A 2022    Procedure: Left ventriculography;  Surgeon: Mayito Levy MD;  Location:  [facility] CATH INVASIVE LOCATION;  Service: Cardiovascular;  Laterality: N/A;    HEMORRHOIDECTOMY N/A 11/3/2021    Procedure: HEMORRHOID BANDING;  Surgeon: Matthew Coronel MD;  [facility];  Service: General;  Laterality: N/A;      Social History:   Social History     Tobacco Use    Smoking status: Former     Years: 5     Types: Cigarettes     Quit date:      Years since quittin.0    Smokeless tobacco: Current    Tobacco comments:     Caffeine - 2 daily   Substance Use Topics    Alcohol use: Not Currently     Comment: occ      Family History:  Family History   Problem Relation Age of Onset    Hypertension Mother     Coronary artery disease Mother         Mother with stent    Heart disease Father         Father with MI and 3 vessel CABG in upper 50's    Hypertension Father     Clotting disorder Father     Cancer Paternal Grandfather     Diabetes Maternal Uncle     Cancer Maternal Uncle     Malig Hyperthermia Neg Hx        Home Meds:  Medications Prior to Admission   Medication Sig Dispense Refill Last Dose    apixaban (Eliquis) 5 MG tablet tablet Take 1 tablet by mouth 2 (Two) Times a Day. (Patient taking differently: Take 1 tablet by mouth Daily.) 60 tablet 0 2024    atorvastatin (LIPITOR) 20 MG tablet Take 1 tablet by mouth Daily. 90 tablet 1 2024    Calcium Carb-Cholecalciferol (Calcium + D3) 600-800 MG-UNIT tablet Take 1 tablet by mouth twice daily 180 tablet 3 2024    clopidogrel (PLAVIX) 75 MG tablet Take 1 tablet by mouth  Daily. 90 tablet 1 1/30/2024    ergocalciferol (ERGOCALCIFEROL) 1.25 MG (35980 UT) capsule Take 1 capsule by mouth 1 (One) Time Per Week. 12 capsule 1 Past Week    Evolocumab (Repatha SureClick) solution auto-injector SureClick injection Inject 1mL under the skin into the appropriate area as directed Every 14 (Fourteen) Days. 6 mL 1 Past Week    HYDROcodone-acetaminophen (NORCO) 7.5-325 MG per tablet Take 1 tablet by mouth Every 6 (Six) Hours As Needed for pain. 100 tablet 0 1/30/2024    lisinopril (PRINIVIL,ZESTRIL) 10 MG tablet Take 1 tablet by mouth daily. 90 tablet 1 1/30/2024    methocarbamol (ROBAXIN) 500 MG tablet Take 1-2 tablets by mouth Every 8 (Eight) Hours As Needed for spasm 90 tablet 1 1/30/2024    metoprolol succinate XL (TOPROL-XL) 25 MG 24 hr tablet Take 1 tablet by mouth Every Morning. 90 tablet 1 1/30/2024    pantoprazole (PROTONIX) 40 MG EC tablet Take 1 tablet by mouth 2 (Two) Times a Day As Needed. 180 tablet 1 1/30/2024    tadalafil (CIALIS) 20 MG tablet Take 1/2-1 tablet by mouth 1 hour prior to sexual intercourse. 10 tablet 5 Past Week    vitamin D (ERGOCALCIFEROL) 1.25 MG (93344 UT) capsule capsule Take 1 capsule by mouth 1 (One) Time Per Week. 12 capsule 0 Past Week    Vortioxetine HBr (Trintellix) 10 MG tablet tablet Take 1 tablet by mouth Daily. 90 tablet 1 1/30/2024    Apixaban Starter Pack tablet therapy pack Take two 5 mg tablets by mouth every 12 hours for 7 days. Followed by one 5 mg tablet every 12 hours. (Dispense starter pack if available) 74 tablet 0     atorvastatin (LIPITOR) 40 MG tablet Take 1 tablet by mouth daily. 90 tablet 1     ergocalciferol (ERGOCALCIFEROL) 1.25 MG (09656 UT) capsule Take 1 capsule by mouth 1 (One) Time Per Week. 12 capsule 1     Evolocumab (Repatha SureClick) solution auto-injector SureClick injection Inject 1mL under the skin into the appropriate area as directed Every 14 (Fourteen) Days. 6 mL 1     HYDROcodone-acetaminophen (NORCO) 7.5-325 MG per  tablet Take 1 tablet by mouth Every 6 (Six) Hours As Needed for pain.  Max Daily Amount: 4 tablets 100 tablet 0     HYDROcodone-acetaminophen (NORCO) 7.5-325 MG per tablet Take 1 tablet by mouth Every 6 (Six) Hours As Needed for pain.  Max Daily Amount: 4 tablets 100 tablet 0     methocarbamol (ROBAXIN) 500 MG tablet Take 1-2 tablets by mouth Every 8 (Eight) Hours As Needed for spasm 90 tablet 1     methylPREDNISolone (MEDROL) 4 MG dose pack Take by mouth as directed by package directions. 21 tablet 0     metoprolol succinate XL (TOPROL-XL) 25 MG 24 hr tablet Take 1 tablet by mouth Daily. 90 tablet 1     nitroglycerin (NITROSTAT) 0.4 MG SL tablet Place 1 tablet under the tongue Every 5 (Five) Minutes As Needed for Chest Pain (Only if SBP Greater Than 100). Take no more than 3 doses in 15 minutes. 25 tablet 0     pantoprazole (PROTONIX) 40 MG EC tablet Take 1 tablet by mouth 2 (Two) Times a Day 30 Minutes Before Breakfast and Dinner. 180 tablet 3     pantoprazole (PROTONIX) 40 MG EC tablet Take 1 tablet by mouth 2 times daily as needed 180 tablet 1     sildenafil (VIAGRA) 50 MG tablet Take 1 tablet by mouth Daily As Needed for erectile dysfunction 10 tablet 2     tadalafil (CIALIS) 10 MG tablet Take 1 tablet by mouth daily as needed for Erectile Dysfunction. 10 tablet 0     tamsulosin (FLOMAX) 0.4 MG capsule 24 hr capsule Take 1 capsule by mouth Every Morning. 90 capsule 1     Vortioxetine HBr (TRINTELLIX) 10 MG tablet tablet Take 1 tablet by mouth Daily. 90 tablet 1     Vortioxetine HBr (Trintellix) 10 MG tablet tablet Take 1 tablet by mouth Every Morning. 90 tablet 0     Vortioxetine HBr (Trintellix) 10 MG tablet tablet Take 1 tablet by mouth Every Morning. 90 tablet 0      Current Meds:   apixaban, 5 mg, Oral, BID  atorvastatin, 20 mg, Oral, Daily  clopidogrel, 75 mg, Oral, Daily  lisinopril, 10 mg, Oral, Daily  metoprolol succinate XL, 25 mg, Oral, QAM  senna-docusate sodium, 2 tablet, Oral, BID  sodium chloride,  10 mL, Intravenous, Q12H      Allergies:  Allergies   Allergen Reactions    Wasp Venom Anaphylaxis    Wellbutrin [Bupropion] Palpitations     Palpitations and chest pain       Review of Systems  The following systems were reviewed and negative;  respiratory, cardiovascular, musculoskeletal, and neurological     Objective     Vital Signs  Temp:  [96.4 °F (35.8 °C)-97.9 °F (36.6 °C)] 97.9 °F (36.6 °C)  Heart Rate:  [62-95] 62  Resp:  [16-18] 16  BP: (111-144)/() 133/97  Physical Exam:  General Appearance:    Alert, cooperative, in no acute distress   Head:    Normocephalic, without obvious abnormality, atraumatic   Eyes:            Lids and lashes normal, conjunctivae and sclerae normal, no   icterus   Throat:   No oral lesions, no thrush, oral mucosa moist   Neck:   No adenopathy, supple, trachea midline, no thyromegaly, no   carotid bruit, no JVD   Lungs:     Clear to auscultation,respirations regular, even and                   unlabored    Heart:    Regular rhythm and normal rate, normal S1 and S2, no            murmur, no gallop, no rub, no click   Chest Wall:    No abnormalities observed   Abdomen:     Normal bowel sounds, no masses, no organomegaly, soft        nontender, nondistended, no guarding, no rebound                 tenderness   Rectal:     Deferred   Extremities:   no edema, no cyanosis, no redness   Skin:   No bleeding, bruising or rash   Lymph nodes:   No palpable adenopathy   Psychiatric:  Judgement and insight: normal   Orientation to person place and time: normal   Mood and affect: normal   Results Review:   I reviewed the patient's new clinical results.    Results from last 7 days   Lab Units 01/31/24  0338 01/30/24  2135   WBC 10*3/mm3 6.97 7.38   HEMOGLOBIN g/dL 14.7 15.6   HEMATOCRIT % 44.2 47.0   PLATELETS 10*3/mm3 199 223     Results from last 7 days   Lab Units 01/31/24  0338 01/30/24  2135   SODIUM mmol/L 141 138   POTASSIUM mmol/L 4.0 3.8   CHLORIDE mmol/L 104 103   CO2 mmol/L 23.7  21.8*   BUN mg/dL 12 11   CREATININE mg/dL 0.80 0.77   CALCIUM mg/dL 9.0 9.1   BILIRUBIN mg/dL  --  0.5   ALK PHOS U/L  --  82   ALT (SGPT) U/L  --  45*   AST (SGOT) U/L  --  30   GLUCOSE mg/dL 101* 123*     Results from last 7 days   Lab Units 01/30/24  2135   INR  1.09     Lab Results   Lab Value Date/Time    LIPASE 21 08/05/2018 1408       Radiology:  CT Abdomen Pelvis With Contrast   Preliminary Result   1. Fatty infiltration of the liver.   2. Small left adrenal nodule probably an adrenal adenoma.   3. No acute abnormalities are seen in the bowel.       Radiation dose reduction techniques were utilized, including automated   exposure control and exposure modulation based on body size.                  Assessment & Plan   Assessment:   48 y.o. male with GERD, HTN, sleep apnea, h/o an MI in the past (coronary stent placed in 2022), DVT (on Eliquis and Plavix - last doses were the AM of 1/30/24)  He normally has intermittent diarrhea.  He has lots of heartburn.      Plan:   I recommend keeping him off of Plavix and Eliquis for now.  I would consider having Cardiology see him to see if it is possible for him to stay off of Plavix?  I would get serial H&H's and transfuse as necessary.  I would recommend that we do an EGD and colonoscopy but I would be want him to be off of Plavix for 5 days and off of Eliquis for 2 days prior to doing this.  We had talked about the possibility of doing the scopes with him on Plavix but he prefers to come off of it for 5 days.  I would recommend that we do the EGD and colonoscopy on Monday, 2/5/2024.    I discussed the patient's findings and my recommendations with patient and family.         Mamadou Forbes M.D.  [facility], [place], operated by [facility]  [address] [place] [address]  Office: [phone]

## 2024-01-31 NOTE — PROGRESS NOTES
ED OBSERVATION PROGRESS/DISCHARGE SUMMARY    Date of Admission: 1/30/2024   LOS: 0 days   PCP: Deya Pack, TAMELA      Subjective:  Patient states he has had no bleeding this morning but has not had a bowel movement yet.  He denies any lightheadedness or dizziness.  He denies abdominal pain and nausea.    Hospital Outcome:   48-year-old male admitted to the observation unit for further evaluation of rectal bleeding.  Patient is on Plavix for previous coronary stent placement and Eliquis for lower extremity DVT.  In the ER, CT of the abdomen and pelvis with contrast showed fatty infiltration of the liver, small left adrenal nodule probably an adrenal adenoma, but no acute abnormality seen in the bowel.  In the ER hemoglobin was 15.6 with a hematocrit of 47.    Morning labs show a hemoglobin at 14.7.  Patient has remained hemodynamically stable.  GI was consulted and would like to hold patient's Plavix to have an EGD and colonoscopy done on Monday 2/5.  We will continue to monitor serial hemoglobin and hematocrit levels.  Cardiology has also been consulted regarding to see if it is possible for the patient to stay off of Plavix.  Patient will need to stay into the hospital and I have spoken with Dr. Elliott who graciously accepts the patient under his service.  Patient is in agreement with the plan.      ROS:  General: no fevers, chills  Respiratory: no cough, dyspnea  Cardiovascular: no chest pain, palpitations  Abdomen: No abdominal pain, nausea, vomiting, or diarrhea  Neurologic: No focal weakness    Objective   Physical Exam:  I have reviewed the vital signs.  Temp:  [96.4 °F (35.8 °C)-97.9 °F (36.6 °C)] 97.5 °F (36.4 °C)  Heart Rate:  [62-95] 63  Resp:  [16-18] 16  BP: (111-144)/() 128/84  General Appearance:  48-year-old male in no acute distress on room air head:    Normocephalic, atraumatic  Eyes:    Sclerae anicteric  Neck:   Supple, no mass  Lungs: Clear to auscultation bilaterally, respirations  unlabored  Heart: Regular rate and rhythm, S1 and S2 normal, no murmur, rub or gallop  Abdomen:  Soft, non-tender, bowel sounds active, nondistended  Extremities: No clubbing, cyanosis, or edema to lower extremities  Pulses:  2+ and symmetric in distal lower extremities  Skin: No rashes   Neurologic: Oriented x3, Normal strength to extremities    Results Review:    I have reviewed the labs, radiology results and diagnostic studies.    Results from last 7 days   Lab Units 01/31/24  0338   WBC 10*3/mm3 6.97   HEMOGLOBIN g/dL 14.7   HEMATOCRIT % 44.2   PLATELETS 10*3/mm3 199     Results from last 7 days   Lab Units 01/31/24  0338 01/30/24  2135   SODIUM mmol/L 141 138   POTASSIUM mmol/L 4.0 3.8   CHLORIDE mmol/L 104 103   CO2 mmol/L 23.7 21.8*   BUN mg/dL 12 11   CREATININE mg/dL 0.80 0.77   CALCIUM mg/dL 9.0 9.1   BILIRUBIN mg/dL  --  0.5   ALK PHOS U/L  --  82   ALT (SGPT) U/L  --  45*   AST (SGOT) U/L  --  30   GLUCOSE mg/dL 101* 123*     Imaging Results (Last 24 Hours)       Procedure Component Value Units Date/Time    CT Abdomen Pelvis With Contrast [[id number]] Collected: 01/30/24 2257     Updated: 01/30/24 2257    Narrative:      CT of the abdomen and pelvis with contrast 1/30/2024     HISTORY: Left lower quadrant pain. Rectal bleeding.     Axial images were obtained from the lung bases to the symphysis pubis  after intravenous contrast. No oral contrast was given.     There is fatty infiltration of the liver. Gallbladder is contracted. The  spleen, pancreas, right adrenal gland and kidneys appear unremarkable. A  tiny left adrenal nodule is seen most likely a small adrenal adenoma.  There is some aortoiliac calcification.     The colon is collapsed. No bowel wall thickening or bowel dilatation is  seen. Urinary bladder and prostate gland appear normal.     No masses or abnormal fluid collections are seen.       Impression:      1. Fatty infiltration of the liver.  2. Small left adrenal nodule probably an adrenal  adenoma.  3. No acute abnormalities are seen in the bowel.     Radiation dose reduction techniques were utilized, including automated  exposure control and exposure modulation based on body size.                  I have reviewed the medications.  ---------------------------------------------------------------------------------------------  Assessment & Plan   Assessment/Problem List    Rectal bleeding      Plan:    Rectal bleeding  -Hemoglobin 15.6, 14.7.  Trend every 8 hours  -Patient hemodynamically stable  -GI following, plan for endoscopy on Monday, 2/5  -Hold Plavix and Eliquis  -Cardiology consulted regarding continuing/discontinuing Plavix  -Cardiac monitoring  -Vital signs every 4 hours  -Continuous pulse ox  -LR at 100 ML/HR       Hypertension  -Blood pressure stable at this time, continue metoprolol and lisinopril  -Monitor with vital signs every 4 hours     History of DVTs  -Eliquis held  -SCDs ordered    [facility]: [facility]    This note will serve as transfer summary      50 minutes have been spent by [facility] providers in the care of this patient while under observation status.    Appropriate PPE worn during patient encounter.  Hand hygeine performed before and after seeing the patient.      Joy Daley PA-C 01/31/24 09:58 EST

## 2024-01-31 NOTE — ED PROVIDER NOTES
MD ATTESTATION NOTE  I supervised care provided by the midlevel provider. We have discussed this patient's history, physical exam, and treatment plan. I have reviewed the midlevel provider's note and I agree with the midlevel provider's findings and plan of care.   SHARED VISIT: This visit was performed by BOTH a physician and an APC. The substantive portion of the medical decision making was performed by this attesting physician who made or approved the management plan and takes responsibility for patient management. All studies in the APC note (if performed) were independently interpreted by me.   I have personally had a face to face encounter with the patient.   See my attending note.    PCP: Deya Pack APRN  Patient Care Team:  Deya Pack APRN as PCP - General (Nurse Practitioner)     Heber Harley is a 48 y.o. male who presents to the ED c/o abdominal pain and rectal bleeding.  Patient reports that he has been having abdominal pain for the last 2 days, complains of left lower quadrant pain, does not radiate.  Patient reports began having rectal bleeding tonight, single bowel movement, gross blood, denies any blood clots.  No nausea vomiting, no urinary symptoms, no fevers.  Patient denies any chest pain or shortness of breath, no lightheadedness or dizziness.  Patient is anticoagulated.    On exam:  General: NAD.  Head: NCAT.  ENT: nares patent, no scleral icterus  Neck: Supple, trachea midline.  Cardiac: regular rate and rhythm.  Lungs: normal effort, clear to auscultation bilaterally  Abdomen: Soft, nondistended, NTTP, no rebound tenderness, no guarding or rigidity.   Extremities: Moves all extremities well, no peripheral edema  Neuro: alert, MAEW, follows commands  Psych: calm, cooperative  Skin: Warm, dry.    Medical Decision Making:  After the initial H&P, I discussed pertinent information from history and physical exam with patient/family.  Discussed differential diagnosis.  Discussed  plan for ED evaluation/work-up/treatment.  All questions answered.  Patient/family is agreeable with plan.    ED Course as of 01/31/24 2300 Tue Jan 30, 2024 2133 The patient presents with bright red rectal bleeding earlier this evening.  Patient is on Plavix and Eliquis.  No lightheadedness.  Stable vitals.  He does complain of left lower quadrant abdominal pain.  Differential diagnoses include but not limited to diverticulitis, hemorrhoids, fissure. [EE]   2149 Hemoglobin: 15.6 [EE]   2206 Creatinine: 0.77 [EE]   2206 BUN: 11 [EE]   2353 Patient is hemodynamically stable here.  Has not had any further bowel movements.  Since patient is on Plavix and Eliquis, prefer to watch him overnight with serial hemoglobins and GI consult.  The patient/family is agreeable to this.  We will admit.    I discussed this with Latesha Lujan, nurse practitioner in the observation unit.  She agrees to admit. [EE]      ED Course User Index  [EE] Tone Randolph, PA       Diagnosis  Final diagnoses:   Bright red rectal bleeding   Anticoagulated          Andrade Reeder MD  01/31/24 2300

## 2024-01-31 NOTE — H&P
[facility]   HISTORY AND PHYSICAL    Patient Name: Heber Harley  : 1975  MRN: [id number]  Primary Care Physician:  Deya Pack APRN  Date of admission: 2024    Subjective   Subjective     Chief Complaint:   Chief Complaint   Patient presents with    Rectal Bleeding         HPI:    Heber Harley is a 48 y.o. male, with a past medical history including, but not limited to, chronic back pain, hypertension, hemorrhoids, NSTEMI, DVT, presented to the emergency department with an episode of blood in his stool.  Patient states that yesterday evening he had some left lower quadrant pain and had a couple episodes of diarrhea.  He then had a more formed stool with blood in the stool.  He has not had any bowel movements since then.  He denies any fever, chills, nausea, vomiting, or any other symptoms.  He states that he has never had this happen before but was concerned since he is on Plavix and Eliquis.  He states he has not had a screening colonoscopy.  In the emergency department lab work was unremarkable other than glucose of 123.  Hemoglobin was 15.6, hematocrit 47.0.  CT abdomen pelvis with contrast shows fatty infiltration of the liver, small left adrenal nodule probably an adrenal adenoma, no acute abnormalities are seen in the bowel.  Gastroenterology has been consulted to see the patient in the a.m.    Review of Systems   All systems were reviewed and negative except for: What was mentioned above in the HPI    Personal History     Past Medical History:   Diagnosis Date    Chronic back pain     COMMON WEALTH PN MGMT [place], NARC RX    GERD (gastroesophageal reflux disease)     Hyperlipidemia     Hypertension     Hypertriglyceridemia     MI, old     Sleep apnea     On CPAP    Venous embolism and thrombosis of superficial vessels of lower extremity        Past Surgical History:   Procedure Laterality Date    ANKLE SURGERY Right     ORIF    CARDIAC CATHETERIZATION Left 2022     Procedure: Coronary Angiography;  Surgeon: Mayito Levy MD;  Location:  [facility]AS[facility];  Service: Cardiovascular;  Laterality: Left;    CARDIAC CATHETERIZATION N/A 7/21/2022    Procedure: Left Heart Cath;  Surgeon: Mayito Levy MD;  Location:  [facility]AS[facility];  Service: Cardiovascular;  Laterality: N/A;    CARDIAC CATHETERIZATION N/A 7/21/2022    Procedure: Stent SID coronary;  Surgeon: Mayito Levy MD;  Location:  [facility]AS[facility];  Service: Cardiovascular;  Laterality: N/A;    CARDIAC CATHETERIZATION N/A 7/21/2022    Procedure: Left ventriculography;  Surgeon: Mayito Levy MD;  Location:  [facility];  Service: Cardiovascular;  Laterality: N/A;    HEMORRHOIDECTOMY N/A 11/3/2021    Procedure: HEMORRHOID BANDING;  Surgeon: Matthew Coronel MD;  [place] [facility] [place] [facility] [place]  Service: General;  Laterality: N/A;       Family History: family history includes Cancer in his maternal uncle and paternal grandfather; Clotting disorder in his father; Coronary artery disease in his mother; Diabetes in his maternal uncle; Heart disease in his father; Hypertension in his father and mother. Otherwise pertinent FHx was reviewed and not pertinent to current issue.    Social History:  reports that he quit smoking about 9 years ago. His smoking use included cigarettes. He uses smokeless tobacco. He reports current alcohol use of about 12.0 standard drinks of alcohol per week. He reports current drug use. Frequency: 7.00 times per week. Drug: Marijuana.    Home Medications:  Apixaban Starter Pack, Calcium Carb-Cholecalciferol, Evolocumab, HYDROcodone-acetaminophen, Vortioxetine HBr, apixaban, atorvastatin, clopidogrel, ergocalciferol, lisinopril, methocarbamol, methylPREDNISolone, metoprolol succinate XL, nitroglycerin, pantoprazole, sildenafil, tadalafil, tamsulosin, and vitamin D    Allergies:  Allergies   Allergen Reactions    Wasp Venom Anaphylaxis    Wellbutrin  [Bupropion] Palpitations     Palpitations and chest pain         Objective   Objective     Vitals:   Temp:  [96.4 °F (35.8 °C)] 96.4 °F (35.8 °C)  Heart Rate:  [75-95] 78  Resp:  [17-18] 17  BP: (111-144)/(83-99) 136/93  Physical Exam    Constitutional: Awake, alert   Eyes: PERRLA, sclerae anicteric, no conjunctival injection   HENT: NCAT, mucous membranes moist   Respiratory: Clear to auscultation bilaterally, nonlabored respirations    Cardiovascular: Rhythm regular palpable pedal pulses bilaterally   Gastrointestinal: Positive bowel sounds, soft, nontender, nondistended   Musculoskeletal: No bilateral ankle edema, no clubbing or cyanosis to extremities   Psychiatric: Appropriate affect, cooperative   Neurologic: Oriented x 3,  speech clear       Result Review    Result Review:  I have personally reviewed the results from the time of this admission to 1/30/2024 23:56 EST and agree with these findings:  [x]  Laboratory list / accordion  []  Microbiology  [x]  Radiology  []  EKG/Telemetry   []  Cardiology/Vascular   []  Pathology  [x]  Old records  []  Other:      Assessment & Plan   Assessment / Plan     Brief Patient Summary:  Heber Harley is a 48 y.o. male who was admitted to the observation unit for further evaluation and treatment of his rectal bleeding.    Active Hospital Problems:  Active Hospital Problems    Diagnosis     **Rectal bleeding      Plan:  Rectal bleeding  -Cardiac monitoring  -Vital signs every 4 hours  -Continuous pulse ox  -GI consult in a.m.  -N.p.o.  -LR at 100 ML/HR  -Repeat labs in a.m.    Hypertension  -Monitor blood pressure  -Continue home blood pressure medications    History of DVTs  -Continue Eliquis      DVT prophylaxis:  Mechanical DVT prophylaxis orders are present.        CODE STATUS:    Code Status (Patient has no pulse and is not breathing): CPR (Attempt to Resuscitate)  Medical Interventions (Patient has pulse or is breathing): Full Support    Admission Status:  I believe  this patient meets observation status.    76 minutes have been spent by [facility] providers in the care of this patient while under observation status.      Appropriate PPE worn during patient encounter.  Hand hygeine performed before and after seeing the patient.      Electronically signed by TAMELA Tomlinson, 01/30/24, 11:56 PM EST.

## 2024-01-31 NOTE — CONSULTS
Date of Consultation: 24    Referral Provider: Tereso Elliott MD    Reason for Consultation: GI bleed, antiplatelet recommendations.    Encounter Provider: Dayron Adames MD    Group of Service: [facility]     Patient Name: Heber Harley    :1975    Chief complaint: Bright red blood per rectum.    History of Present Illness:      Heber Harley is a 48 year-old patient who follows with Dr. Velasquez in the office. He has a past medical history significant for coronary artery disease, hyperlipidemia, hypertension, and palpitations.    In 2022, he presented with a NSTEMI. He underwent a left heart catheterization that showed a chronic mid LAD occlusion, moderate to severe disease in diagonal and acute occlusion of OM1. He underwent percutaneous coronary intervention with drug-eluting stent to the OM1. He had an echocardiogram post-cath that showed an ejection fraction of 51-55%.  He has been on Plavix since that time.  He also takes Eliquis for history of DVT of the left lower extremity in 2023.    The patient presents the emergency department with bright red blood per rectum.  He also had some left lower quadrant discomfort and several episodes of diarrhea.  His hemoglobin has remained fairly stable.  He has not had any chest pain or new shortness of breath.  Cardiology has been consulted regarding Plavix recommendations.        Cath 2022  SUMMARY: He I think he has a chronic occlusion of his mid LAD but has moderate to severe disease in a diagonal that may explain some of his symptoms he has been having for months but he has an acute occlusion of his OM1 and that the cause of his non-ST elevation MI.  Successfully angioplasty and drug-eluting stent to     EBL:                     Minimal  Specimens:         None     PCI Segment:                   OM1  Pre-stenosis:                    1000  Post-stenosis                   0  Lesion Type                      C  FARZANEH  Flow Pre                   0  FARZANEH Flow Post                 3  Dissection                        none        RECOMMENDATIONS: Routine PCI care he is going to need to be treated medically for period of time but he may need a bypass to the diagonal LAD if he keeps having symptoms.  Routine PCI care and risk modification as well as MI care    ECHO 7/1/2022  Calculated left ventricular EF = 63% Calculated left ventricular 3D EF = 56% Estimated left ventricular EF was in agreement with the calculated left ventricular EF. Left ventricular systolic function is normal.  Left ventricular diastolic function was normal.  Sclerotic trileaflet aortic valve with mild calcification of the noncoronary cusp noted without any significant stenosis or regurgitation.  Normal right ventricular size and function.     Stress 5/30/2017  Diaphragmatic attenuation and GI artifacts are present.  Myocardial perfusion imaging indicates a normal myocardial perfusion study with no evidence of ischemia.  Left ventricular ejection fraction is normal (Calculated EF = 67%).  Impressions are consistent with a low risk study.  Past Medical History:   Diagnosis Date    Chronic back pain     COMMON WEALTH PN MGMT [place], NARC RX    GERD (gastroesophageal reflux disease)     Hyperlipidemia     Hypertension     Hypertriglyceridemia     MI, old     Sleep apnea     On CPAP    Venous embolism and thrombosis of superficial vessels of lower extremity          Past Surgical History:   Procedure Laterality Date    ANKLE SURGERY Right     ORIF    CARDIAC CATHETERIZATION Left 7/21/2022    Procedure: Coronary Angiography;  Surgeon: Mayito Levy MD;  Location:  [facility];  Service: Cardiovascular;  Laterality: Left;    CARDIAC CATHETERIZATION N/A 7/21/2022    Procedure: Left Heart Cath;  Surgeon: Mayito Levy MD;  Location:  [facility];  Service: Cardiovascular;  Laterality: N/A;    CARDIAC CATHETERIZATION N/A 7/21/2022     Procedure: Stent SID coronary;  Surgeon: Mayito Levy MD;  Location:  [facility];  Service: Cardiovascular;  Laterality: N/A;    CARDIAC CATHETERIZATION N/A 7/21/2022    Procedure: Left ventriculography;  Surgeon: Mayito Levy MD;  Location:  [facility];  Service: Cardiovascular;  Laterality: N/A;    HEMORRHOIDECTOMY N/A 11/3/2021    Procedure: HEMORRHOID BANDING;  Surgeon: Matthew Coronel MD;  [place] [facility] [place] [facility] [place];  Service: General;  Laterality: N/A;         Allergies   Allergen Reactions    Wasp Venom Anaphylaxis    Wellbutrin [Bupropion] Palpitations     Palpitations and chest pain           No current facility-administered medications on file prior to encounter.     Current Outpatient Medications on File Prior to Encounter   Medication Sig Dispense Refill    apixaban (Eliquis) 5 MG tablet tablet Take 1 tablet by mouth 2 (Two) Times a Day. (Patient taking differently: Take 1 tablet by mouth Daily.) 60 tablet 0    atorvastatin (LIPITOR) 20 MG tablet Take 1 tablet by mouth Daily. 90 tablet 1    Calcium Carb-Cholecalciferol (Calcium + D3) 600-800 MG-UNIT tablet Take 1 tablet by mouth twice daily 180 tablet 3    clopidogrel (PLAVIX) 75 MG tablet Take 1 tablet by mouth Daily. 90 tablet 1    ergocalciferol (ERGOCALCIFEROL) 1.25 MG (69005 UT) capsule Take 1 capsule by mouth 1 (One) Time Per Week. 12 capsule 1    Evolocumab (Repatha SureClick) solution auto-injector SureClick injection Inject 1mL under the skin into the appropriate area as directed Every 14 (Fourteen) Days. 6 mL 1    HYDROcodone-acetaminophen (NORCO) 7.5-325 MG per tablet Take 1 tablet by mouth Every 6 (Six) Hours As Needed for pain. 100 tablet 0    lisinopril (PRINIVIL,ZESTRIL) 10 MG tablet Take 1 tablet by mouth daily. 90 tablet 1    methocarbamol (ROBAXIN) 500 MG tablet Take 1-2 tablets by mouth Every 8 (Eight) Hours As Needed for spasm 90 tablet 1    metoprolol succinate XL (TOPROL-XL) 25 MG 24 hr  tablet Take 1 tablet by mouth Every Morning. 90 tablet 1    pantoprazole (PROTONIX) 40 MG EC tablet Take 1 tablet by mouth 2 (Two) Times a Day As Needed. 180 tablet 1    tadalafil (CIALIS) 20 MG tablet Take 1/2-1 tablet by mouth 1 hour prior to sexual intercourse. 10 tablet 5    vitamin D (ERGOCALCIFEROL) 1.25 MG (07466 UT) capsule capsule Take 1 capsule by mouth 1 (One) Time Per Week. 12 capsule 0    Vortioxetine HBr (Trintellix) 10 MG tablet tablet Take 1 tablet by mouth Daily. 90 tablet 1    [DISCONTINUED] Apixaban Starter Pack tablet therapy pack Take two 5 mg tablets by mouth every 12 hours for 7 days. Followed by one 5 mg tablet every 12 hours. (Dispense starter pack if available) 74 tablet 0    [DISCONTINUED] atorvastatin (LIPITOR) 40 MG tablet Take 1 tablet by mouth daily. 90 tablet 1    [DISCONTINUED] ergocalciferol (ERGOCALCIFEROL) 1.25 MG (90969 UT) capsule Take 1 capsule by mouth 1 (One) Time Per Week. 12 capsule 1    [DISCONTINUED] Evolocumab (Repatha SureClick) solution auto-injector SureClick injection Inject 1mL under the skin into the appropriate area as directed Every 14 (Fourteen) Days. 6 mL 1    [DISCONTINUED] HYDROcodone-acetaminophen (NORCO) 7.5-325 MG per tablet Take 1 tablet by mouth Every 6 (Six) Hours As Needed for pain.  Max Daily Amount: 4 tablets 100 tablet 0    [DISCONTINUED] HYDROcodone-acetaminophen (NORCO) 7.5-325 MG per tablet Take 1 tablet by mouth Every 6 (Six) Hours As Needed for pain. Max daily amount: 4 tablets 100 tablet 0    [DISCONTINUED] HYDROcodone-acetaminophen (NORCO) 7.5-325 MG per tablet Take 1 tablet by mouth Every 6 (Six) Hours As Needed for pain.  Max Daily Amount: 4 tablets 100 tablet 0    [DISCONTINUED] methocarbamol (ROBAXIN) 500 MG tablet Take 1-2 tablets by mouth Every 8 (Eight) Hours As Needed for spasm 90 tablet 1    [DISCONTINUED] methylPREDNISolone (MEDROL) 4 MG dose pack Take by mouth as directed by package directions. 21 tablet 0    [DISCONTINUED]  metoprolol succinate XL (TOPROL-XL) 25 MG 24 hr tablet Take 1 tablet by mouth Daily. 90 tablet 1    [DISCONTINUED] nitroglycerin (NITROSTAT) 0.4 MG SL tablet Place 1 tablet under the tongue Every 5 (Five) Minutes As Needed for Chest Pain (Only if SBP Greater Than 100). Take no more than 3 doses in 15 minutes. 25 tablet 0    [DISCONTINUED] pantoprazole (PROTONIX) 40 MG EC tablet Take 1 tablet by mouth 2 (Two) Times a Day 30 Minutes Before Breakfast and Dinner. 180 tablet 3    [DISCONTINUED] pantoprazole (PROTONIX) 40 MG EC tablet Take 1 tablet by mouth 2 times daily as needed 180 tablet 1    [DISCONTINUED] sildenafil (VIAGRA) 50 MG tablet Take 1 tablet by mouth Daily As Needed for erectile dysfunction 10 tablet 2    [DISCONTINUED] tadalafil (CIALIS) 10 MG tablet Take 1 tablet by mouth daily as needed for Erectile Dysfunction. 10 tablet 0    [DISCONTINUED] tamsulosin (FLOMAX) 0.4 MG capsule 24 hr capsule Take 1 capsule by mouth Every Morning. 90 capsule 1    [DISCONTINUED] Vortioxetine HBr (TRINTELLIX) 10 MG tablet tablet Take 1 tablet by mouth Daily. 90 tablet 1    [DISCONTINUED] Vortioxetine HBr (Trintellix) 10 MG tablet tablet Take 1 tablet by mouth Every Morning. 90 tablet 0    [DISCONTINUED] Vortioxetine HBr (Trintellix) 10 MG tablet tablet Take 1 tablet by mouth Every Morning. 90 tablet 0         Social History     Socioeconomic History    Marital status:    Tobacco Use    Smoking status: Former     Years: 5     Types: Cigarettes     Quit date:      Years since quittin.0    Smokeless tobacco: Current    Tobacco comments:     Caffeine - 2 daily   Vaping Use    Vaping Use: Never used   Substance and Sexual Activity    Alcohol use: Not Currently     Comment: occ    Drug use: Yes     Frequency: 7.0 times per week     Types: Marijuana     Comment: daily/PAIN MGMT RX NARC CH BACK PAIN    Sexual activity: Defer         Family History   Problem Relation Age of Onset    Hypertension Mother     Coronary  "artery disease Mother         Mother with stent    Heart disease Father         Father with MI and 3 vessel CABG in upper 50's    Hypertension Father     Clotting disorder Father     Cancer Paternal Grandfather     Diabetes Maternal Uncle     Cancer Maternal Uncle     Malig Hyperthermia Neg Hx        REVIEW OF SYSTEMS:   Pertinent positives are noted in HPI above.  Otherwise, all other systems were reviewed, and are negative.     Objective:     Vitals:    01/31/24 0332 01/31/24 0550 01/31/24 0646 01/31/24 0809   BP: 137/98  133/97 128/84   BP Location: Right arm   Right arm   Patient Position: Lying   Lying   Pulse: 65 66 62 63   Resp: 16   16   Temp: 97.9 °F (36.6 °C)   97.5 °F (36.4 °C)   TempSrc: Oral   Oral   SpO2: 98%   99%   Weight:       Height:         Body mass index is 36.65 kg/m².  Flowsheet Rows      Flowsheet Row First Filed Value   Admission Height 175.3 cm (69\") Documented at 01/30/2024 2057   Admission Weight 113 kg (250 lb) Documented at 01/30/2024 2057             General:    No acute distress, alert and oriented x4, pleasant                   Head:    Normocephalic, atraumatic.   Eyes:          Conjunctivae and sclerae normal, no icterus.   Throat:   No oral lesions, no thrush, oral mucosa moist.    Neck:   Supple, trachea midline.   Lungs:     Clear to auscultation bilaterally     Heart:    Regular rhythm and normal rate.  No murmurs, gallops, or rubs noted.   Abdomen:     Soft, non-tender, non-distended, positive bowel sounds.    Extremities:   No clubbing, cyanosis, or edema.     Pulses:   Pulses palpable and equal bilaterally.    Skin:   No bleeding or rash.   Neuro:   Non-focal.  Moves all extremities well.    Psychiatric:   Normal mood and affect.           Lab Review:                Results from last 7 days   Lab Units 01/31/24  0338   SODIUM mmol/L 141   POTASSIUM mmol/L 4.0   CHLORIDE mmol/L 104   CO2 mmol/L 23.7   BUN mg/dL 12   CREATININE mg/dL 0.80   GLUCOSE mg/dL 101*   CALCIUM mg/dL " "9.0         Results from last 7 days   Lab Units 01/31/24  0338   WBC 10*3/mm3 6.97   HEMOGLOBIN g/dL 14.7   HEMATOCRIT % 44.2   PLATELETS 10*3/mm3 199     Results from last 7 days   Lab Units 01/30/24  2135   INR  1.09   APTT seconds 26.2                   EKG (reviewed by me personally):  9/28/2023    3/20/2023          Assessment:   1.  Bright red blood per rectum  2.  Left lower quadrant pain, resolved  3.  Intermittent diarrhea  4.  Gastroesophageal reflux symptoms (\"heartburn\")  5.  Coronary artery disease, status post SID to OM1 on 7/20/2022.  The mid LAD was noted to be chronically occluded at that time.  6.  History of left lower extremity DVT 9/28/2023, on Eliquis  7.  Hypertension  8.  Dyslipidemia and hypertriglyceridemia    Plan:       Again, he had bright red blood per rectum, has had intermittent diarrhea, and has reflux type symptoms.  Dr. Forbes has recommended an EGD and colonoscopy, although he will need to be off Plavix for at least 5 days prior and Eliquis 2 days prior.    He is having no anginal symptoms, and his drug-eluting stent to OM1 was placed on 7/20/2022.  He is well over the recommended 1 year timeframe for Plavix.  I would recommend holding the Plavix at least until his bidirectional scopes can be completed.  From my standpoint, he can hold this indefinitely.  At some point in the future, he should get on antiplatelet therapy again (even a baby aspirin) given his coronary artery disease.  He also has a known chronic total occlusion of the mid LAD.    He is going to stay until his scopes, which are scheduled on Monday 2/5/2024.  He is cleared from a cardiac standpoint to proceed at acceptable risk.    Thank you very much for this consult.    Agapito Adames MD     "

## 2024-02-01 ENCOUNTER — TELEPHONE (OUTPATIENT)
Dept: GASTROENTEROLOGY | Facility: CLINIC | Age: 49
End: 2024-02-01
Payer: COMMERCIAL

## 2024-02-01 VITALS
RESPIRATION RATE: 18 BRPM | HEIGHT: 70 IN | TEMPERATURE: 97.7 F | OXYGEN SATURATION: 98 % | DIASTOLIC BLOOD PRESSURE: 96 MMHG | BODY MASS INDEX: 36.56 KG/M2 | HEART RATE: 62 BPM | WEIGHT: 255.4 LBS | SYSTOLIC BLOOD PRESSURE: 139 MMHG

## 2024-02-01 DIAGNOSIS — R10.13 DYSPEPSIA: ICD-10-CM

## 2024-02-01 DIAGNOSIS — K62.5 RECTAL BLEEDING: Primary | ICD-10-CM

## 2024-02-01 LAB
ANION GAP SERPL CALCULATED.3IONS-SCNC: 11 MMOL/L (ref 5–15)
BUN SERPL-MCNC: 10 MG/DL (ref 6–20)
BUN/CREAT SERPL: 12.5 (ref 7–25)
CALCIUM SPEC-SCNC: 9.3 MG/DL (ref 8.6–10.5)
CHLORIDE SERPL-SCNC: 104 MMOL/L (ref 98–107)
CO2 SERPL-SCNC: 24 MMOL/L (ref 22–29)
CREAT SERPL-MCNC: 0.8 MG/DL (ref 0.76–1.27)
DEPRECATED RDW RBC AUTO: 38.2 FL (ref 37–54)
EGFRCR SERPLBLD CKD-EPI 2021: 109.2 ML/MIN/1.73
ERYTHROCYTE [DISTWIDTH] IN BLOOD BY AUTOMATED COUNT: 11.9 % (ref 12.3–15.4)
GLUCOSE SERPL-MCNC: 107 MG/DL (ref 65–99)
HCT VFR BLD AUTO: 45.5 % (ref 37.5–51)
HCT VFR BLD AUTO: 48.2 % (ref 37.5–51)
HCT VFR BLD AUTO: 48.2 % (ref 37.5–51)
HGB BLD-MCNC: 15.7 G/DL (ref 13–17.7)
HGB BLD-MCNC: 16 G/DL (ref 13–17.7)
HGB BLD-MCNC: 16 G/DL (ref 13–17.7)
MCH RBC QN AUTO: 29.1 PG (ref 26.6–33)
MCHC RBC AUTO-ENTMCNC: 33.2 G/DL (ref 31.5–35.7)
MCV RBC AUTO: 87.6 FL (ref 79–97)
PLATELET # BLD AUTO: 219 10*3/MM3 (ref 140–450)
PMV BLD AUTO: 10.6 FL (ref 6–12)
POTASSIUM SERPL-SCNC: 4.1 MMOL/L (ref 3.5–5.2)
RBC # BLD AUTO: 5.5 10*6/MM3 (ref 4.14–5.8)
SODIUM SERPL-SCNC: 139 MMOL/L (ref 136–145)
WBC NRBC COR # BLD AUTO: 7.49 10*3/MM3 (ref 3.4–10.8)

## 2024-02-01 PROCEDURE — 99214 OFFICE O/P EST MOD 30 MIN: CPT | Performed by: INTERNAL MEDICINE

## 2024-02-01 PROCEDURE — 80048 BASIC METABOLIC PNL TOTAL CA: CPT | Performed by: STUDENT IN AN ORGANIZED HEALTH CARE EDUCATION/TRAINING PROGRAM

## 2024-02-01 PROCEDURE — 96361 HYDRATE IV INFUSION ADD-ON: CPT

## 2024-02-01 PROCEDURE — 99214 OFFICE O/P EST MOD 30 MIN: CPT | Performed by: PHYSICIAN ASSISTANT

## 2024-02-01 PROCEDURE — 85027 COMPLETE CBC AUTOMATED: CPT | Performed by: STUDENT IN AN ORGANIZED HEALTH CARE EDUCATION/TRAINING PROGRAM

## 2024-02-01 PROCEDURE — 25810000003 LACTATED RINGERS PER 1000 ML: Performed by: STUDENT IN AN ORGANIZED HEALTH CARE EDUCATION/TRAINING PROGRAM

## 2024-02-01 PROCEDURE — G0378 HOSPITAL OBSERVATION PER HR: HCPCS

## 2024-02-01 PROCEDURE — 85014 HEMATOCRIT: CPT | Performed by: STUDENT IN AN ORGANIZED HEALTH CARE EDUCATION/TRAINING PROGRAM

## 2024-02-01 PROCEDURE — 85018 HEMOGLOBIN: CPT | Performed by: STUDENT IN AN ORGANIZED HEALTH CARE EDUCATION/TRAINING PROGRAM

## 2024-02-01 RX ADMIN — SODIUM CHLORIDE, POTASSIUM CHLORIDE, SODIUM LACTATE AND CALCIUM CHLORIDE 100 ML/HR: 600; 310; 30; 20 INJECTION, SOLUTION INTRAVENOUS at 06:33

## 2024-02-01 RX ADMIN — METOPROLOL SUCCINATE 25 MG: 25 TABLET, EXTENDED RELEASE ORAL at 06:32

## 2024-02-01 RX ADMIN — PANTOPRAZOLE SODIUM 40 MG: 40 TABLET, DELAYED RELEASE ORAL at 06:33

## 2024-02-01 RX ADMIN — ATORVASTATIN CALCIUM 20 MG: 20 TABLET, FILM COATED ORAL at 08:30

## 2024-02-01 RX ADMIN — METHOCARBAMOL TABLETS 750 MG: 750 TABLET, COATED ORAL at 10:16

## 2024-02-01 RX ADMIN — HYDROCODONE BITARTRATE AND ACETAMINOPHEN 1 TABLET: 7.5; 325 TABLET ORAL at 05:36

## 2024-02-01 RX ADMIN — HYDROCODONE BITARTRATE AND ACETAMINOPHEN 1 TABLET: 7.5; 325 TABLET ORAL at 12:20

## 2024-02-01 RX ADMIN — LISINOPRIL 10 MG: 10 TABLET ORAL at 08:30

## 2024-02-01 NOTE — TELEPHONE ENCOUNTER
Pt is [facility] for md pedroza on call date 02/06 arrive at 830am procedure   Colon and egd     Patient has paper work in hand and my chart

## 2024-02-01 NOTE — PROGRESS NOTES
"Patient Care Team:  Deya Pack APRN as PCP - General (Nurse Practitioner)    Chief Complaint:  Follow-up GI bleed, PCI to the OM in 2022    Interval History:   Hematocrit stable.    Objective   Vital Signs  Temp:  [97.7 °F (36.5 °C)-98.8 °F (37.1 °C)] 97.9 °F (36.6 °C)  Heart Rate:  [60-76] 76  Resp:  [16-18] 18  BP: (127-140)/() 135/99    Intake/Output Summary (Last 24 hours) at 2/1/2024 0909  Last data filed at 1/31/2024 1848  Gross per 24 hour   Intake 240 ml   Output --   Net 240 ml     Flowsheet Rows      Flowsheet Row First Filed Value   Admission Height 175.3 cm (69\") Documented at 01/30/2024 2057   Admission Weight 113 kg (250 lb) Documented at 01/30/2024 2057            Temp:  [97.7 °F (36.5 °C)-98.8 °F (37.1 °C)] 97.9 °F (36.6 °C)  Heart Rate:  [60-76] 76  Resp:  [16-18] 18  BP: (127-140)/() 135/99    Intake/Output Summary (Last 24 hours) at 2/1/2024 0909  Last data filed at 1/31/2024 1848  Gross per 24 hour   Intake 240 ml   Output --   Net 240 ml     Flowsheet Rows      Flowsheet Row First Filed Value   Admission Height 175.3 cm (69\") Documented at 01/30/2024 2057   Admission Weight 113 kg (250 lb) Documented at 01/30/2024 2057            General Appearance:    Alert, cooperative, in no acute distress   Head:    Normocephalic, without obvious abnormality, atraumatic       Neck/Lymph   No adenopathy, supple, no thyromegaly, no carotid bruit, no    JVD   Lungs:     Clear to auscultation bilaterally, no wheezes, rales, or     rhonchi    Cardiac:    Normal rate, regular rhythm, no murmur, no rub, no gallop   Chest Wall:    No abnormalities observed   GI:     Normal bowel sounds, soft, nontender, nondistended,            no rebound tenderness   Extremities:   No cyanosis, clubbing, or edema   Circulatory/Peripheral Vascular :   Pulses palpable and equal bilaterally   Integumentary:   No bleeding or rash. Normal temperature                atorvastatin, 20 mg, Oral, Daily  lisinopril, 10 " mg, Oral, Daily  metoprolol succinate XL, 25 mg, Oral, QAM  pantoprazole, 40 mg, Oral, BID AC  senna-docusate sodium, 2 tablet, Oral, BID  sodium chloride, 10 mL, Intravenous, Q12H  Vortioxetine HBr, 10 mg, Oral, Daily        lactated ringers, 100 mL/hr, Last Rate: 100 mL/hr (02/01/24 0834)        Results Review:    Results from last 7 days   Lab Units 02/01/24  0516   SODIUM mmol/L 139   POTASSIUM mmol/L 4.1   CHLORIDE mmol/L 104   CO2 mmol/L 24.0   BUN mg/dL 10   CREATININE mg/dL 0.80   GLUCOSE mg/dL 107*   CALCIUM mg/dL 9.3         Results from last 7 days   Lab Units 02/01/24  0516   WBC 10*3/mm3 7.49   HEMOGLOBIN g/dL 16.0  16.0   HEMATOCRIT % 48.2  48.2   PLATELETS 10*3/mm3 219     Results from last 7 days   Lab Units 01/30/24  2135   INR  1.09   APTT seconds 26.2                 @LABNT(bnp)@  I reviewed the patient's new clinical results.  I personally viewed and interpreted the patient's EKG/Telemetry data            Assessment & Plan   1.  GI bleed  2.  Coronary disease with history of PCI July 2022  3.  DVT previously on direct oral anticoagulant.  In setting of recurrent DVT and recommend lifelong anticoagulant therapy    -Fine leaving patient off of Plavix.  PCI was in 2022.  Consider restart of aspirin as outpatient.  -GI workup per that team.  -I will see back in follow-up in 1 month.

## 2024-02-01 NOTE — CASE MANAGEMENT/SOCIAL WORK
Discharge Planning Assessment  [facility]     Patient Name: Heber Harley  MRN: [id number]  Today's Date: 2/1/2024    Admit Date: 1/30/2024    Plan: Home w/ spouse   Discharge Needs Assessment       Row Name 02/01/24 0822       Living Environment    People in Home spouse    Current Living Arrangements home    Potentially Unsafe Housing Conditions none    In the past 12 months has the electric, gas, oil, or water company threatened to shut off services in your home? No    Primary Care Provided by self    Provides Primary Care For no one    Family Caregiver if Needed spouse    Quality of Family Relationships involved;helpful;supportive    Able to Return to Prior Arrangements yes       Resource/Environmental Concerns    Resource/Environmental Concerns none       Transportation Needs    In the past 12 months, has lack of transportation kept you from medical appointments or from getting medications? no    In the past 12 months, has lack of transportation kept you from meetings, work, or from getting things needed for daily living? No       Food Insecurity    Within the past 12 months, you worried that your food would run out before you got the money to buy more. Never true    Within the past 12 months, the food you bought just didn't last and you didn't have money to get more. Never true       Transition Planning    Patient/Family Anticipates Transition to home    Patient/Family Anticipated Services at Transition none    Transportation Anticipated family or friend will provide;car, drives self       Discharge Needs Assessment    Readmission Within the Last 30 Days no previous admission in last 30 days    Equipment Currently Used at Home cpap    Concerns to be Addressed denies needs/concerns at this time;no discharge needs identified    Anticipated Changes Related to Illness none    Equipment Needed After Discharge none    Provided Post Acute Provider List? N/A    Provided Post Acute Provider Quality & Resource List?  N/A                   Discharge Plan       Row Name 02/01/24 0824       Plan    Plan Home w/ spouse    Plan Comments S/W pt and his spouse Bettye at bedside.  Pt gave permission to discuss DC planning w/ wife present.  Facesheet info confirmed.  Pt lives in a house w/ Bettye, is IADLs and can drive.  His only home DME is a CPAP.  No hx of HH or SNF.   Pt plans to return home with his spouse upon DC and denies any DC needs at this time. CCP will continue to follow and assist if needed.  ...........Roslyn UMAÑA/ KAITLYNN                  Continued Care and Services - Admitted Since 1/30/2024    Coordination has not been started for this encounter.       Expected Discharge Date and Time       Expected Discharge Date Expected Discharge Time    Feb 5, 2024            Demographic Summary       Row Name 02/01/24 0821       General Information    Admission Type observation    Arrived From home    Referral Source admission list    Reason for Consult discharge planning    Preferred Language English                   Functional Status       Row Name 02/01/24 0821       Functional Status    Usual Activity Tolerance good    Current Activity Tolerance good       Functional Status, IADL    Medications independent    Meal Preparation independent;assistive person    Housekeeping independent;assistive person    Laundry independent;assistive person    Shopping independent;assistive person       Mental Status    General Appearance WDL WDL       Mental Status Summary    Recent Changes in Mental Status/Cognitive Functioning no changes       Employment/    Employment Status self-employed                               Roslyn Puga RN

## 2024-02-01 NOTE — PROGRESS NOTES
[facility]  Inpatient Progress Note    Reason for Follow-up: GI bleed    Subjective     Interval History:   Patient reports doing fairly well today.  Denies abdominal pain but is tender to palpation in the epigastrium.  Denies nausea, vomiting, constipation, diarrhea.  No blood in last bowel movement.  Hemoglobin stable at 16.    Current Facility-Administered Medications:     atorvastatin (LIPITOR) tablet 20 mg, 20 mg, Oral, Daily, Jatin Daleyitlin R, PA-C, 20 mg at 02/01/24 0830    sennosides-docusate (PERICOLACE) 8.6-50 MG per tablet 2 tablet, 2 tablet, Oral, BID **AND** polyethylene glycol (MIRALAX) packet 17 g, 17 g, Oral, Daily PRN **AND** bisacodyl (DULCOLAX) EC tablet 5 mg, 5 mg, Oral, Daily PRN **AND** bisacodyl (DULCOLAX) suppository 10 mg, 10 mg, Rectal, Daily PRN, Joy Daley R, PA-C    HYDROcodone-acetaminophen (NORCO) 7.5-325 MG per tablet 1 tablet, 1 tablet, Oral, Q6H PRN, Sujatha Daleylin R, PA-C, 1 tablet at 02/01/24 1220    lactated ringers infusion, 100 mL/hr, Intravenous, Continuous, Joy Daley R, PA-C, Last Rate: 100 mL/hr at 02/01/24 0834, 100 mL/hr at 02/01/24 0834    lisinopril (PRINIVIL,ZESTRIL) tablet 10 mg, 10 mg, Oral, Daily, Jatin Daleyitlin R, PA-C, 10 mg at 02/01/24 0830    methocarbamol (ROBAXIN) tablet 750 mg, 750 mg, Oral, Q8H PRN, Sujatha Daleylin R, PA-C, 750 mg at 02/01/24 1016    metoprolol succinate XL (TOPROL-XL) 24 hr tablet 25 mg, 25 mg, Oral, QAM, Sujatha Daleylin R, PA-C, 25 mg at 02/01/24 0632    nitroglycerin (NITROSTAT) SL tablet 0.4 mg, 0.4 mg, Sublingual, Q5 Min PRN, Joy Daley PA-C    ondansetron ODT (ZOFRAN-ODT) disintegrating tablet 4 mg, 4 mg, Oral, Q6H PRN **OR** ondansetron (ZOFRAN) injection 4 mg, 4 mg, Intravenous, Q6H PRN, Joy Daley PA-C    pantoprazole (PROTONIX) EC tablet 40 mg, 40 mg, Oral, BID AC, Tereso Elliott MD, 40 mg at 02/01/24 0633    [COMPLETED] Insert Peripheral IV, , , Once  **AND** sodium chloride 0.9 % flush 10 mL, 10 mL, Intravenous, PRN, Edi, Joy R, PA-C    sodium chloride 0.9 % flush 10 mL, 10 mL, Intravenous, Q12H, Edi, Joy R, PA-C, 10 mL at 01/31/24 2027    sodium chloride 0.9 % flush 10 mL, 10 mL, Intravenous, PRN, Edi, Joy R, PA-C    sodium chloride 0.9 % infusion 40 mL, 40 mL, Intravenous, PRN, Edi, Joy R, PA-C    Vortioxetine HBr (TRINTELLIX) tablet 10 mg, 10 mg, Oral, Daily, Tereso Elliott MD, 10 mg at 02/01/24 0831  Review of Systems:    The following systems were reviewed and negative;  respiratory and cardiovascular    Objective     Vital Signs  Temp:  [97.7 °F (36.5 °C)-98.8 °F (37.1 °C)] 97.7 °F (36.5 °C)  Heart Rate:  [60-78] 78  Resp:  [16-18] 18  BP: (123-140)/() 123/93  Body mass index is 36.65 kg/m².    Intake/Output Summary (Last 24 hours) at 2/1/2024 1324  Last data filed at 2/1/2024 0830  Gross per 24 hour   Intake 600 ml   Output --   Net 600 ml     I/O this shift:  In: 360 [P.O.:360]  Out: -      Physical Exam:    General: patient awake, alert and cooperative   Eyes: Normal lids and lashes, no scleral icterus   Skin: warm and dry, not jaundiced   Pulm: regular and unlabored   Abdomen: soft, epigastric TTP without guarding, nondistended; normal bowel sounds   Psychiatric: Normal mood and behavior; memory intact     Results Review:     I reviewed the patient's new clinical results.    Results from last 7 days   Lab Units 02/01/24  0516 01/31/24  2348 01/31/24  1626 01/31/24  0338 01/30/24  2135   WBC 10*3/mm3 7.49  --   --  6.97 7.38   HEMOGLOBIN g/dL 16.0  16.0 15.7 16.8 14.7 15.6   HEMATOCRIT % 48.2  48.2 45.5 50.1 44.2 47.0   PLATELETS 10*3/mm3 219  --   --  199 223     Results from last 7 days   Lab Units 02/01/24  0516 01/31/24  0338 01/30/24  2135   SODIUM mmol/L 139 141 138   POTASSIUM mmol/L 4.1 4.0 3.8   CHLORIDE mmol/L 104 104 103   CO2 mmol/L 24.0 23.7 21.8*   BUN mg/dL 10 12 11   CREATININE mg/dL 0.80  0.80 0.77   CALCIUM mg/dL 9.3 9.0 9.1   BILIRUBIN mg/dL  --   --  0.5   ALK PHOS U/L  --   --  82   ALT (SGPT) U/L  --   --  45*   AST (SGOT) U/L  --   --  30   GLUCOSE mg/dL 107* 101* 123*     Results from last 7 days   Lab Units 01/30/24  2135   INR  1.09     Lab Results   Lab Value Date/Time    LIPASE 21 08/05/2018 1408       Radiology:  CT Abdomen Pelvis With Contrast   Final Result   1. Fatty infiltration of the liver.   2. Small left adrenal nodule probably an adrenal adenoma.   3. No acute abnormalities are seen in the bowel.       Radiation dose reduction techniques were utilized, including automated   exposure control and exposure modulation based on body size.           This report was finalized on 2/1/2024 9:39 AM by Dr. Tushar Carpenter M.D on Workstation: [id number]              Assessment & Plan     Active Hospital Problems    Diagnosis     **Rectal bleeding     Status post insertion of drug eluting coronary artery stent     Coronary artery disease involving native coronary artery of native heart without angina pectoris     Obesity (BMI 30-39.9)     HLD (hyperlipidemia)     Essential hypertension     Grade III hemorrhoids        Assessment:  Rectal bleeding  History of intermittent diarrhea  Dyspepsia/GERD  History of acute MI with stent placement 2022-on Plavix, held, last dose 1/30 a.m.  History of DVT-on Eliquis, held, last dose 1/30/2024      Plan:  Continue to monitor for overt GI bleed.  Monitor hemoglobin and transfuse as necessary.  Reviewed cardiology consult note from 1/31-they are okay with him permanently stopping Plavix given length from last stent.  Eliquis held currently but he will need to go back on this after endoscopy.  He apparently developed DVT while on Plavix and aspirin.  If anticoagulation felt necessary for DVT prophylaxis, could consider heparin/Lovenox until day before endoscopy.  Will defer to hospitalist.  Plan for bidirectional endoscopies on Tuesday 2/6/2024 at which  time he will be 5 days off the Plavix and Eliquis.  Will plan for this as outpatient with Dr. Montanez.  Be for discharge from GI standpoint.    I discussed the patients findings and my recommendations with patient and family.    Dragon dictation used throughout this note.            Maliha Gray PA-C  [facility]  [address] [place] [address]  Office: [phone]

## 2024-02-01 NOTE — DISCHARGE SUMMARY
Patient Name: Heber Harley  : 1975  MRN: [id number]    Date of Admission: 2024  Date of Discharge:  2024  Primary Care Physician: Deya Pack APRN      Chief Complaint:   Rectal Bleeding      Discharge Diagnoses     Active Hospital Problems    Diagnosis  POA    **Rectal bleeding [K62.5]  Yes    Status post insertion of drug eluting coronary artery stent [Z95.5]  Not Applicable    Coronary artery disease involving native coronary artery of native heart without angina pectoris [I25.10]  Yes    Obesity (BMI 30-39.9) [E66.9]  Yes    HLD (hyperlipidemia) [E78.5]  Yes    Essential hypertension [I10]  Yes    Grade III hemorrhoids [K64.2]  Yes      Resolved Hospital Problems   No resolved problems to display.        Hospital Course     Mr. Harley is a 48 y.o. male with a history of CAD status post in 2022, chronically occluded mid LAD, hypertension, GERD, left lower extremity DVT on Eliquis, hemorrhoids depression presenting with rectal bleeding x 1.  Patient was admitted for observation and his hemoglobin has been stable and completely normal.  He has had 2 brown stools.  Cardiology was consulted and is okay to stop Plavix.  Future antiplatelets will be started on at his outpatient follow-up with Dr. Velasquez later this month.  GI was consulted and was able to schedule him next Tuesday as an outpatient.  Okay to resume Eliquis but will need to be discontinued after Saturday evening dose.  This was put in the discharge instructions.  Prior to admission he was only taking Eliquis 5 mg daily.    Incidental small left adrenal nodule found on CT abdomen.  Consider further workup as an outpatient.    At the time of discharge patient was told to take all medications as prescribed, keep all follow-up appointments, and call their doctor or return to the hospital with any worsening or concerning symptoms.    Day of Discharge     Subjective:  Patient is out of bed.  Has had 2 brown stools without any  blood.  Hemoglobin is 16.  Family at bedside.  Tolerated breakfast without any nausea or vomiting.     Review of Systems   Constitutional:  Negative for chills and fever.   Respiratory:  Negative for cough and shortness of breath.    Cardiovascular:  Negative for chest pain and palpitations.   Gastrointestinal:  Negative for abdominal pain, diarrhea, nausea and vomiting.       Physical Exam:  Temp:  [97.7 °F (36.5 °C)-98.8 °F (37.1 °C)] 97.7 °F (36.5 °C)  Heart Rate:  [60-78] 78  Resp:  [16-18] 18  BP: (123-140)/() 123/93  Body mass index is 36.65 kg/m².  Physical Exam  Vitals and nursing note reviewed.   Constitutional:       General: He is not in acute distress.  Cardiovascular:      Rate and Rhythm: Normal rate and regular rhythm.   Pulmonary:      Effort: Pulmonary effort is normal. No respiratory distress.   Abdominal:      General: Abdomen is flat. There is no distension.      Tenderness: There is no abdominal tenderness.   Musculoskeletal:         General: No swelling or deformity.   Skin:     General: Skin is warm and dry.   Neurological:      General: No focal deficit present.      Mental Status: He is alert. Mental status is at baseline.         Consultants     Consult Orders (all) (From admission, onward)       Start     Ordered    01/31/24 1005  Inpatient Cardiology Consult  Once        Specialty:  Cardiology  Provider:  Kevin Velasquez MD    01/31/24 1005    01/31/24 0848  Inpatient Internal Medicine Consult  Once        Specialty:  Internal Medicine  Provider:  Tereso Elliott MD    01/31/24 0847    01/31/24 0702  Inpatient Gastroenterology Consult  [place]         Specialty:  Gastroenterology  Provider:  Albert Guidry MD    01/30/24 4562                  Procedures     Imaging Results (All)       Procedure Component Value Units Date/Time    CT Abdomen Pelvis With Contrast [[id number]] Collected: 01/30/24 2257     Updated: 02/01/24 0942    Narrative:      CT OF THE ABDOMEN AND PELVIS  WITH CONTRAST 01/30/2024     HISTORY: Left lower quadrant pain. Rectal bleeding.     TECHNIQUE: Axial images were obtained from the lung bases to the  symphysis pubis after intravenous contrast. No oral contrast was given.     FINDINGS: There is fatty infiltration of the liver. Gallbladder is  contracted. The spleen, pancreas, right adrenal gland and kidneys appear  unremarkable. A tiny left adrenal nodule is seen most likely a small  adrenal adenoma. There is some aortoiliac calcification.     The colon is collapsed. No bowel wall thickening or bowel dilatation is  seen. Urinary bladder and prostate gland appear normal.     No masses or abnormal fluid collections are seen.       Impression:      1. Fatty infiltration of the liver.  2. Small left adrenal nodule probably an adrenal adenoma.  3. No acute abnormalities are seen in the bowel.     Radiation dose reduction techniques were utilized, including automated  exposure control and exposure modulation based on body size.        This report was finalized on 2/1/2024 9:39 AM by Dr. Tushar Carpenter M.D on Workstation: [id number]               Pertinent Labs     Results from last 7 days   Lab Units 02/01/24  0516 01/31/24  2348 01/31/24  1626 01/31/24  0338 01/30/24  2135   WBC 10*3/mm3 7.49  --   --  6.97 7.38   HEMOGLOBIN g/dL 16.0  16.0 15.7 16.8 14.7 15.6   PLATELETS 10*3/mm3 219  --   --  199 223     Results from last 7 days   Lab Units 02/01/24  0516 01/31/24  0338 01/30/24  2135   SODIUM mmol/L 139 141 138   POTASSIUM mmol/L 4.1 4.0 3.8   CHLORIDE mmol/L 104 104 103   CO2 mmol/L 24.0 23.7 21.8*   BUN mg/dL 10 12 11   CREATININE mg/dL 0.80 0.80 0.77   GLUCOSE mg/dL 107* 101* 123*   Estimated Creatinine Clearance: 144.1 mL/min (by C-G formula based on SCr of 0.8 mg/dL).  Results from last 7 days   Lab Units 01/30/24  2135   ALBUMIN g/dL 4.5   BILIRUBIN mg/dL 0.5   ALK PHOS U/L 82   AST (SGOT) U/L 30   ALT (SGPT) U/L 45*     Results from last 7 days   Lab Units  "02/01/24  0516 01/31/24  0338 01/30/24  2135   CALCIUM mg/dL 9.3 9.0 9.1   ALBUMIN g/dL  --   --  4.5               Invalid input(s): \"LDLCALC\"        Test Results Pending at Discharge       Discharge Details        Discharge Medications        Changes to Medications        Instructions Start Date   Eliquis 5 MG tablet tablet  Generic drug: apixaban  What changed: when to take this   5 mg, Oral, 2 Times Daily      vitamin D 1.25 MG (14474 UT) capsule capsule  Commonly known as: ERGOCALCIFEROL  What changed: Another medication with the same name was removed. Continue taking this medication, and follow the directions you see here.   50,000 Units, Oral, Weekly             Continue These Medications        Instructions Start Date   atorvastatin 20 MG tablet  Commonly known as: LIPITOR   20 mg, Oral, Daily      Calcium+D3 600-800 MG-UNIT tablet  Generic drug: Calcium Carb-Cholecalciferol   Take 1 tablet by mouth twice daily      HYDROcodone-acetaminophen 7.5-325 MG per tablet  Commonly known as: NORCO   Take 1 tablet by mouth Every 6 (Six) Hours As Needed for pain.      lisinopril 10 MG tablet  Commonly known as: PRINIVIL,ZESTRIL   Take 1 tablet by mouth daily.      methocarbamol 500 MG tablet  Commonly known as: ROBAXIN   Take 1-2 tablets by mouth Every 8 (Eight) Hours As Needed for spasm      metoprolol succinate XL 25 MG 24 hr tablet  Commonly known as: TOPROL-XL   25 mg, Oral, Every Morning      pantoprazole 40 MG EC tablet  Commonly known as: PROTONIX   40 mg, Oral, 2 Times Daily PRN      Repatha SureClick solution auto-injector SureClick injection  Generic drug: Evolocumab   Inject 1mL under the skin into the appropriate area as directed Every 14 (Fourteen) Days.      tadalafil 20 MG tablet  Commonly known as: CIALIS   Take 1/2-1 tablet by mouth 1 hour prior to sexual intercourse.      Trintellix 10 MG tablet tablet  Generic drug: Vortioxetine HBr   10 mg, Oral, Daily             Stop These Medications  "     clopidogrel 75 MG tablet  Commonly known as: PLAVIX              Allergies   Allergen Reactions    Wasp Venom Anaphylaxis    Wellbutrin [Bupropion] Palpitations     Palpitations and chest pain           Discharge Disposition:  Home or Self Care    Discharge Diet:  Diet Order   Procedures    Diet: Regular/House Diet; Texture: Regular Texture (IDDSI 7); Fluid Consistency: Thin (IDDSI 0)       Discharge Activity:   As tolerated    CODE STATUS:    Code Status and Medical Interventions:   Ordered at: 01/30/24 4824     Code Status (Patient has no pulse and is not breathing):    CPR (Attempt to Resuscitate)     Medical Interventions (Patient has pulse or is breathing):    Full Support       Future Appointments   Date Time Provider Department Center   3/11/2024 11:20 AM Kevin Velasquez MD [facility]      Follow-up Information       Dinora Montanez MD Follow up on 2/6/2024.    Specialty: Gastroenterology  Why: Colonoscopy--- please present to the endoscopy building which is [address] [facility], [address].  Do not present to [address] office listed above.  Separate instructions will be given to patient.  Contact information:  [facility] [facility] [address]  [facility] [address] [phone]               Deya Pack, APRN .    Specialty: Nurse Practitioner  Contact information:  [address]  [facility] [address] [phone]                             Time Spent on Discharge:  Greater than 30 minutes      Tereso Elliott MD  [facility]  02/01/24  15:23 EST

## 2024-02-01 NOTE — PROGRESS NOTES
Name: Heber Harley ADMIT: 2024   : 1975  PCP: Deya Pack APRN    MRN: [id number] LOS: 0 days   AGE/SEX: 48 y.o. male  ROOM: [facility]     Subjective   Subjective     Patient is out of bed.  Has had 2 brown stools without any blood.  Hemoglobin is 16.  Family at bedside.  Tolerated breakfast without any nausea or vomiting.    Review of Systems   Constitutional:  Negative for chills and fever.   Respiratory:  Negative for cough and shortness of breath.    Cardiovascular:  Negative for chest pain and palpitations.   Gastrointestinal:  Negative for abdominal pain, blood in stool, diarrhea, nausea and vomiting.     As above     Objective   Objective   Vital Signs  Temp:  [97.7 °F (36.5 °C)-98.8 °F (37.1 °C)] 97.7 °F (36.5 °C)  Heart Rate:  [60-78] 78  Resp:  [16-18] 18  BP: (123-140)/() 123/93  SpO2:  [97 %-98 %] 98 %  on   ;   Device (Oxygen Therapy): room air  Body mass index is 36.65 kg/m².  Physical Exam  Vitals and nursing note reviewed.   Constitutional:       General: He is not in acute distress.  Cardiovascular:      Rate and Rhythm: Normal rate and regular rhythm.   Pulmonary:      Effort: Pulmonary effort is normal. No respiratory distress.   Abdominal:      General: Abdomen is flat. There is no distension.      Tenderness: There is no abdominal tenderness.   Musculoskeletal:         General: No swelling or deformity.   Skin:     General: Skin is warm and dry.   Neurological:      General: No focal deficit present.      Mental Status: He is alert. Mental status is at baseline.         Results Review     I reviewed the patient's new clinical results.  Results from last 7 days   Lab Units 24  0516 24  2348 24  1626 24   WBC 10*3/mm3 7.49  --   --  6.97 7.38   HEMOGLOBIN g/dL 16.0  16.0 15.7 16.8 14.7 15.6   PLATELETS 10*3/mm3 219  --   --  199 223     Results from last 7 days   Lab Units 24  0516 24  0338 24  "  SODIUM mmol/L 139 141 138   POTASSIUM mmol/L 4.1 4.0 3.8   CHLORIDE mmol/L 104 104 103   CO2 mmol/L 24.0 23.7 21.8*   BUN mg/dL 10 12 11   CREATININE mg/dL 0.80 0.80 0.77   GLUCOSE mg/dL 107* 101* 123*   Estimated Creatinine Clearance: 144.1 mL/min (by C-G formula based on SCr of 0.8 mg/dL).  Results from last 7 days   Lab Units 01/30/24  2135   ALBUMIN g/dL 4.5   BILIRUBIN mg/dL 0.5   ALK PHOS U/L 82   AST (SGOT) U/L 30   ALT (SGPT) U/L 45*     Results from last 7 days   Lab Units 02/01/24  0516 01/31/24  0338 01/30/24  2135   CALCIUM mg/dL 9.3 9.0 9.1   ALBUMIN g/dL  --   --  4.5       COVID19   Date Value Ref Range Status   07/20/2022 Not Detected Not Detected - Ref. Range Final     No results found for: \"HGBA1C\", \"POCGLU\"    CT Abdomen Pelvis With Contrast  Narrative: CT OF THE ABDOMEN AND PELVIS WITH CONTRAST 01/30/2024     HISTORY: Left lower quadrant pain. Rectal bleeding.     TECHNIQUE: Axial images were obtained from the lung bases to the  symphysis pubis after intravenous contrast. No oral contrast was given.     FINDINGS: There is fatty infiltration of the liver. Gallbladder is  contracted. The spleen, pancreas, right adrenal gland and kidneys appear  unremarkable. A tiny left adrenal nodule is seen most likely a small  adrenal adenoma. There is some aortoiliac calcification.     The colon is collapsed. No bowel wall thickening or bowel dilatation is  seen. Urinary bladder and prostate gland appear normal.     No masses or abnormal fluid collections are seen.     Impression: 1. Fatty infiltration of the liver.  2. Small left adrenal nodule probably an adrenal adenoma.  3. No acute abnormalities are seen in the bowel.     Radiation dose reduction techniques were utilized, including automated  exposure control and exposure modulation based on body size.        This report was finalized on 2/1/2024 9:39 AM by Dr. Tushar Carpenter M.D on Workstation: [id number]       I reviewed the patient's daily " medications.  Scheduled Medications  atorvastatin, 20 mg, Oral, Daily  lisinopril, 10 mg, Oral, Daily  metoprolol succinate XL, 25 mg, Oral, QAM  pantoprazole, 40 mg, Oral, BID AC  senna-docusate sodium, 2 tablet, Oral, BID  sodium chloride, 10 mL, Intravenous, Q12H  Vortioxetine HBr, 10 mg, Oral, Daily    Infusions     Diet  Diet: Regular/House Diet; Texture: Regular Texture (IDDSI 7); Fluid Consistency: Thin (IDDSI 0)         I have personally reviewed:  [x]  Laboratory   []  Microbiology   []  Radiology   [x]  EKG/Telemetry   []  Cardiology/Vascular   []  Pathology   []  Records     Assessment/Plan     Active Hospital Problems    Diagnosis  POA    **Rectal bleeding [K62.5]  Yes    Status post insertion of drug eluting coronary artery stent [Z95.5]  Not Applicable    Coronary artery disease involving native coronary artery of native heart without angina pectoris [I25.10]  Yes    Obesity (BMI 30-39.9) [E66.9]  Yes    HLD (hyperlipidemia) [E78.5]  Yes    Essential hypertension [I10]  Yes    Grade III hemorrhoids [K64.2]  Yes      Resolved Hospital Problems   No resolved problems to display.       48 y.o. male admitted with Rectal bleeding.    Rectal bleeding  -Trend hemoglobin  -GI consulted-recommend doing an EGD and colonoscopy on 2/5/2020 5 days off Plavix and 2 days of Eliquis.  -Cardiology consulted, okay with holding Plavix indefinitely.  Baby aspirin for antiplatelet therapy to be started as an outpatient    CAD status post stent in July 2022  Chronically occluded mid LAD  Hypertension  -Cardiology consulted.  Okay with baby aspirin after procedures.  -Continue home lisinopril, metoprolol, atorvastatin    GERD-continue home PPI    Lower extremity DVT diagnosed in September 2024  -Holding Eliquis for now.  Was only taking Eliquis 5 mg daily at home.    Depression-continue home Trintellix    Small left adrenal nodule, possible adenoma  -Further workup as an outpatient      SCDs for DVT prophylaxis.  Lovenox  tomorrow if no further bleeding and hemoglobin continues to be stable.  Full code.  Discussed with patient, family, nursing staff, and ED provider.  Anticipate discharge home with family when cleared by consultants.    Expected Discharge Date: 2/5/2024; Expected Discharge Time:       Tereso Elliott MD  [facility]  02/01/24  15:01 EST

## 2024-02-06 ENCOUNTER — ANESTHESIA (OUTPATIENT)
Dept: GASTROENTEROLOGY | Facility: HOSPITAL | Age: 49
End: 2024-02-06
Payer: COMMERCIAL

## 2024-02-06 ENCOUNTER — ANESTHESIA EVENT (OUTPATIENT)
Dept: GASTROENTEROLOGY | Facility: HOSPITAL | Age: 49
End: 2024-02-06
Payer: COMMERCIAL

## 2024-02-06 ENCOUNTER — HOSPITAL ENCOUNTER (OUTPATIENT)
Facility: HOSPITAL | Age: 49
Setting detail: HOSPITAL OUTPATIENT SURGERY
Discharge: HOME | End: 2024-02-06
Attending: INTERNAL MEDICINE | Admitting: INTERNAL MEDICINE
Payer: COMMERCIAL

## 2024-02-06 VITALS
HEART RATE: 84 BPM | HEIGHT: 69 IN | RESPIRATION RATE: 16 BRPM | OXYGEN SATURATION: 95 % | SYSTOLIC BLOOD PRESSURE: 135 MMHG | BODY MASS INDEX: 37.77 KG/M2 | WEIGHT: 255 LBS | DIASTOLIC BLOOD PRESSURE: 99 MMHG

## 2024-02-06 DIAGNOSIS — K62.5 RECTAL BLEEDING: ICD-10-CM

## 2024-02-06 DIAGNOSIS — R10.13 DYSPEPSIA: ICD-10-CM

## 2024-02-06 PROCEDURE — 25010000002 PROPOFOL 10 MG/ML EMULSION: Performed by: NURSE ANESTHETIST, CERTIFIED REGISTERED

## 2024-02-06 PROCEDURE — 45385 COLONOSCOPY W/LESION REMOVAL: CPT | Performed by: INTERNAL MEDICINE

## 2024-02-06 PROCEDURE — 88342 IMHCHEM/IMCYTCHM 1ST ANTB: CPT | Performed by: INTERNAL MEDICINE

## 2024-02-06 PROCEDURE — S0260 H&P FOR SURGERY: HCPCS | Performed by: INTERNAL MEDICINE

## 2024-02-06 PROCEDURE — 43239 EGD BIOPSY SINGLE/MULTIPLE: CPT | Performed by: INTERNAL MEDICINE

## 2024-02-06 PROCEDURE — 25810000003 LACTATED RINGERS PER 1000 ML: Performed by: NURSE ANESTHETIST, CERTIFIED REGISTERED

## 2024-02-06 PROCEDURE — 88305 TISSUE EXAM BY PATHOLOGIST: CPT | Performed by: INTERNAL MEDICINE

## 2024-02-06 PROCEDURE — 25010000002 GLYCOPYRROLATE 0.2 MG/ML SOLUTION: Performed by: NURSE ANESTHETIST, CERTIFIED REGISTERED

## 2024-02-06 DEVICE — DEV CLIP ENDO RESOLUTION360 CONTRL ROT 235CM: Type: IMPLANTABLE DEVICE | Site: CECUM | Status: FUNCTIONAL

## 2024-02-06 RX ORDER — GLYCOPYRROLATE 0.2 MG/ML
INJECTION INTRAMUSCULAR; INTRAVENOUS AS NEEDED
Status: DISCONTINUED | OUTPATIENT
Start: 2024-02-06 | End: 2024-02-06 | Stop reason: SURG

## 2024-02-06 RX ORDER — LIDOCAINE HYDROCHLORIDE 20 MG/ML
INJECTION, SOLUTION INFILTRATION; PERINEURAL AS NEEDED
Status: DISCONTINUED | OUTPATIENT
Start: 2024-02-06 | End: 2024-02-06 | Stop reason: SURG

## 2024-02-06 RX ORDER — SODIUM CHLORIDE, SODIUM LACTATE, POTASSIUM CHLORIDE, CALCIUM CHLORIDE 600; 310; 30; 20 MG/100ML; MG/100ML; MG/100ML; MG/100ML
INJECTION, SOLUTION INTRAVENOUS CONTINUOUS PRN
Status: DISCONTINUED | OUTPATIENT
Start: 2024-02-06 | End: 2024-02-06 | Stop reason: SURG

## 2024-02-06 RX ORDER — PROPOFOL 10 MG/ML
VIAL (ML) INTRAVENOUS CONTINUOUS PRN
Status: DISCONTINUED | OUTPATIENT
Start: 2024-02-06 | End: 2024-02-06 | Stop reason: SURG

## 2024-02-06 RX ADMIN — LIDOCAINE HYDROCHLORIDE 60 MG: 20 INJECTION, SOLUTION INFILTRATION; PERINEURAL at 10:00

## 2024-02-06 RX ADMIN — PROPOFOL 200 MCG/KG/MIN: 10 INJECTION, EMULSION INTRAVENOUS at 10:00

## 2024-02-06 RX ADMIN — SODIUM CHLORIDE, POTASSIUM CHLORIDE, SODIUM LACTATE AND CALCIUM CHLORIDE: 600; 310; 30; 20 INJECTION, SOLUTION INTRAVENOUS at 09:58

## 2024-02-06 RX ADMIN — GLYCOPYRROLATE 0.2 MG: 0.2 INJECTION INTRAMUSCULAR; INTRAVENOUS at 10:00

## 2024-02-06 RX ADMIN — PROPOFOL 100 MG: 10 INJECTION, EMULSION INTRAVENOUS at 10:02

## 2024-02-06 NOTE — ANESTHESIA POSTPROCEDURE EVALUATION
Patient: Heber Harley    Procedure Summary       Date: 02/06/24 Room / Location:  [facility] /  [facility]    Anesthesia Start: 0958 Anesthesia Stop: 1044    Procedures:       COLONOSCOPY TO CECUM WITH COLD SNARE POLYPECTOMIES AND HOT SNARE POLYPECTOMIES WITH RESOLUTION CLIP PLACEMENT X4      ESOPHAGOGASTRODUODENOSCOPY WITH BIOPSIES (Esophagus) Diagnosis:       Rectal bleeding      Dyspepsia      (Rectal bleeding [K62.5])      (Dyspepsia [R10.13])    Surgeons: Dinora Montanez MD Provider: Nils Hancock MD    Anesthesia Type: MAC ASA Status: 3            Anesthesia Type: MAC    Vitals  Vitals Value Taken Time   /98 02/06/24 1053   Temp     Pulse 83 02/06/24 1053   Resp     SpO2 94 % 02/06/24 1053   Vitals shown include unfiled device data.        Post Anesthesia Care and Evaluation    Patient location during evaluation: PHASE II  Patient participation: waiting for patient participation  Level of consciousness: sleepy but conscious  Pain management: adequate    Airway patency: patent    Cardiovascular status: acceptable  Respiratory status: acceptable  Hydration status: acceptable

## 2024-02-06 NOTE — H&P
[facility]  Pre Procedure History & Physical    Chief Complaint:   Gerd, dyspepsia, rectal bleeding    Subjective     HPI:   48-year-old gentleman for EGD colonoscopy.  History of intermittent diarrhea with recent admission for bright red blood per rectum.  He has frequent indigestion and dyspepsia.  No family history of colorectal cancer or polyps that he is aware of.  He has a history of CAD status post coronary stent placement in 2022 on Plavix have a history of DVT on Eliquis.  Last doses of both were 1/30/2024.    Past Medical History:   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        Past Surgical History:  Past Surgical History:   Procedure Laterality Date    ANKLE SURGERY Right     ORIF    CARDIAC CATHETERIZATION Left 7/21/2022    Procedure: Coronary Angiography;  Surgeon: Mayito Levy MD;  Location: [facility] INVASIVE LOCATION;  Service: Cardiovascular;  Laterality: Left;    CARDIAC CATHETERIZATION N/A 7/21/2022    Procedure: Left Heart Cath;  Surgeon: Mayito Levy MD;  Location: [facility] CATH INVASIVE LOCATION;  Service: Cardiovascular;  Laterality: N/A;    CARDIAC CATHETERIZATION N/A 7/21/2022    Procedure: Stent SID coronary;  Surgeon: Mayito Levy MD;  Location: [facility] CATH INVASIVE LOCATION;  Service: Cardiovascular;  Laterality: N/A;    CARDIAC CATHETERIZATION N/A 7/21/2022    Procedure: Left ventriculography;  Surgeon: Mayito Levy MD;  Location: [facility] CATH INVASIVE LOCATION;  Service: Cardiovascular;  Laterality: N/A;    HEMORRHOIDECTOMY N/A 11/3/2021    Procedure: HEMORRHOID BANDING;  Surgeon: Matthew Coronel MD;  Location: [facility];  Service: General;   Laterality: N/A;       Family History:  Family History   Problem Relation Age of Onset    Hypertension Mother     Coronary artery disease Mother         Mother with stent    Heart disease Father         Father with MI and 3 vessel CABG in upper 50's    Hypertension Father     Clotting disorder Father     Cancer Paternal Grandfather     Diabetes Maternal Uncle     Cancer Maternal Uncle     Malvinnie Hyperthermia Neg Hx        Social History:   reports that he quit smoking about 9 years ago. His smoking use included cigarettes. He uses smokeless tobacco. He reports that he does not currently use alcohol. He reports current drug use. Frequency: 7.00 times per week. Drug: Marijuana.    Medications:   Medications Prior to Admission   Medication Sig Dispense Refill Last Dose    apixaban (Eliquis) 5 MG tablet tablet Take 1 tablet by mouth 2 (Two) Times a Day. 60 tablet 0 2/3/2024    atorvastatin (LIPITOR) 20 MG tablet Take 1 tablet by mouth Daily. 90 tablet 1 2/5/2024    Calcium Carb-Cholecalciferol (Calcium + D3) 600-800 MG-UNIT tablet Take 1 tablet by mouth twice daily 180 tablet 3 2/5/2024    Evolocumab (Repatha SureClick) solution auto-injector SureClick injection Inject 1mL under the skin into the appropriate area as directed Every 14 (Fourteen) Days. 6 mL 1 2/5/2024    HYDROcodone-acetaminophen (NORCO) 7.5-325 MG per tablet Take 1 tablet by mouth Every 6 (Six) Hours As Needed for pain. 100 tablet 0 2/5/2024    lisinopril (PRINIVIL,ZESTRIL) 10 MG tablet Take 1 tablet by mouth daily. 90 tablet 1 2/5/2024    methocarbamol (ROBAXIN) 500 MG tablet Take 1-2 tablets by mouth Every 8 (Eight) Hours As Needed for spasm 90 tablet 1 2/5/2024    metoprolol succinate XL (TOPROL-XL) 25 MG 24 hr tablet Take 1 tablet by mouth Every Morning. 90 tablet 1 2/6/2024    pantoprazole (PROTONIX) 40 MG EC tablet Take 1 tablet by mouth 2 (Two) Times a Day As Needed. 180 tablet 1 2/5/2024    vitamin D (ERGOCALCIFEROL) 1.25 MG (04581 UT) capsule  "capsule Take 1 capsule by mouth 1 (One) Time Per Week. 12 capsule 0 2/5/2024    Vortioxetine HBr (Trintellix) 10 MG tablet tablet Take 1 tablet by mouth Daily. 90 tablet 1 2/5/2024    tadalafil (CIALIS) 20 MG tablet Take 1/2-1 tablet by mouth 1 hour prior to sexual intercourse. 10 tablet 5 Unknown       Allergies:  Wasp venom and Wellbutrin [bupropion]    ROS:    Pertinent items are noted in HPI     Objective     Blood pressure 139/100, pulse 76, resp. rate 16, height 175.3 cm (69\"), weight 116 kg (255 lb), SpO2 96%.    Physical Exam   Constitutional: Pt is oriented to person, place, and time and well-developed, well-nourished, and in no distress.   Mouth/Throat: Oropharynx is clear and moist.   Neck: Normal range of motion.   Cardiovascular: Normal rate, regular rhythm    Pulmonary/Chest: Effort normal    Abdominal: Soft. Nontender  Skin: Skin is warm and dry.   Psychiatric: Mood, memory, affect and judgment normal.     Assessment & Plan     Diagnosis:  Gerd, dyspepsia, rectal bleeding    Anticipated Surgical Procedure:  Egd/colonoscopy    The risks, benefits, and alternatives of this procedure have been discussed with the patient or the responsible party- the patient understands and agrees to proceed.                                                              "

## 2024-02-06 NOTE — ANESTHESIA PREPROCEDURE EVALUATION
Anesthesia Evaluation     Patient summary reviewed and Nursing notes reviewed   no history of anesthetic complications:   NPO Solid Status: > 8 hours  NPO Liquid Status: > 2 hours           Airway   Mallampati: III  TM distance: >3 FB  Neck ROM: full  Large neck circumference  Dental - normal exam     Pulmonary - normal exam    breath sounds clear to auscultation  (+) a smoker Former,sleep apnea  Cardiovascular - normal exam  Exercise tolerance: good (4-7 METS)    Patient on routine beta blocker  Rhythm: regular  Rate: normal    (+) hypertension 2 medications or greater, past MI (NSTEMI)  >12 months, CAD, cardiac stents more than 12 months ago , hyperlipidemia  (-) angina, orthopnea, PND, GONZALES      Neuro/Psych- negative ROS  GI/Hepatic/Renal/Endo    (+) obesity, GERD, GI bleeding lower     Musculoskeletal (-) negative ROS    Abdominal    Substance History - negative use     OB/GYN negative ob/gyn ROS         Other - negative ROS                     Anesthesia Plan    ASA 3     MAC   total IV anesthesia  (Patient understands anesthesia not responsible for dental damage.)    Anesthetic plan, risks, benefits, and alternatives have been provided, discussed and informed consent has been obtained with: patient.

## 2024-02-08 LAB
LAB AP CASE REPORT: NORMAL
LAB AP DIAGNOSIS COMMENT: NORMAL
PATH REPORT.FINAL DX SPEC: NORMAL
PATH REPORT.GROSS SPEC: NORMAL

## 2024-02-13 ENCOUNTER — TELEPHONE (OUTPATIENT)
Dept: GASTROENTEROLOGY | Facility: CLINIC | Age: 49
End: 2024-02-13
Payer: COMMERCIAL

## 2024-02-13 DIAGNOSIS — K29.70 HELICOBACTER PYLORI GASTRITIS: Primary | ICD-10-CM

## 2024-02-13 DIAGNOSIS — B96.81 HELICOBACTER PYLORI GASTRITIS: Primary | ICD-10-CM

## 2024-02-13 RX ORDER — CLARITHROMYCIN 500 MG/1
500 TABLET, COATED ORAL 2 TIMES DAILY
Qty: 28 TABLET | Refills: 0 | Status: SHIPPED | OUTPATIENT
Start: 2024-02-13

## 2024-02-13 RX ORDER — AMOXICILLIN 500 MG/1
1000 CAPSULE ORAL 2 TIMES DAILY
Qty: 56 CAPSULE | Refills: 0 | Status: SHIPPED | OUTPATIENT
Start: 2024-02-13

## 2024-03-01 ENCOUNTER — TELEPHONE (OUTPATIENT)
Dept: GASTROENTEROLOGY | Facility: CLINIC | Age: 49
End: 2024-03-01

## 2024-03-01 NOTE — TELEPHONE ENCOUNTER
Hub staff attempted to follow warm transfer process and was unsuccessful     Caller: Bettye Harley    Relationship to patient: Emergency Contact    Best call back number: [phone]    Patient is needing: PATIENT NEEDS TO RESCHEDULE BREATH TEST.  PLEASE REACH OUT TO RESCHEDULE.

## 2024-03-05 ENCOUNTER — LAB (OUTPATIENT)
Dept: GASTROENTEROLOGY | Facility: CLINIC | Age: 49
End: 2024-03-05
Payer: COMMERCIAL

## 2024-03-07 LAB — UREA BREATH TEST QL: NEGATIVE

## 2024-03-12 ENCOUNTER — OFFICE VISIT (OUTPATIENT)
Dept: CARDIOLOGY | Facility: CLINIC | Age: 49
End: 2024-03-12
Payer: COMMERCIAL

## 2024-03-12 VITALS
HEIGHT: 69 IN | BODY MASS INDEX: 37.5 KG/M2 | WEIGHT: 253.2 LBS | SYSTOLIC BLOOD PRESSURE: 128 MMHG | HEART RATE: 68 BPM | DIASTOLIC BLOOD PRESSURE: 82 MMHG

## 2024-03-12 DIAGNOSIS — I25.10 CORONARY ARTERY DISEASE INVOLVING NATIVE CORONARY ARTERY OF NATIVE HEART WITHOUT ANGINA PECTORIS: ICD-10-CM

## 2024-03-12 DIAGNOSIS — I10 ESSENTIAL HYPERTENSION: Primary | ICD-10-CM

## 2024-03-12 DIAGNOSIS — Z95.5 STATUS POST INSERTION OF DRUG ELUTING CORONARY ARTERY STENT: ICD-10-CM

## 2024-03-12 DIAGNOSIS — E78.5 HYPERLIPIDEMIA, UNSPECIFIED HYPERLIPIDEMIA TYPE: ICD-10-CM

## 2024-03-12 PROCEDURE — 99214 OFFICE O/P EST MOD 30 MIN: CPT | Performed by: NURSE PRACTITIONER

## 2024-03-12 PROCEDURE — 93000 ELECTROCARDIOGRAM COMPLETE: CPT | Performed by: NURSE PRACTITIONER

## 2024-03-12 RX ORDER — ASPIRIN 81 MG/1
81 TABLET ORAL DAILY
Start: 2024-03-12

## 2024-03-12 NOTE — PROGRESS NOTES
Date of Office Visit: 2024  Encounter Provider: TAMELA Pablo  Place of Service: [facility] CARDIOLOGY  Patient Name: Heber Harley  :1975    No chief complaint on file.  : coronary artery disease    HPI: Heber Harley is a 48 y.o. male who is a patient of Dr. Velasquez.  He presents for  follow-up.  He has a history of coronary artery disease, hyperlipidemia, hypertension, palpitations and DVT of left lower extremity.  He has a family history of premature coronary disease with his father having three-vessel bypass in his late 50s.    Patient presented to hospital with non-STEMI in 2022.  Left heart cath showed chronic occlusion of the mid LAD, moderate to severe disease in diagonal and acute occlusion of OM1.  He underwent PCI/SID to his OM1.  Patient maintained normal LV function.  2D echocardiogram the following day showed LVEF 51 to 55%.     Repatha was added to statin in 2022 by his PCP for better control of his lipid panel.      In 2023, patient was started on Eliquis for DVT of the left lower extremity.  On 2024, patient presented to the emergency department with bright red blood per rectum.  Also had some left lower quadrant discomfort and several episodes of diarrhea.  Hemoglobin remained stable.  No chest pain or shortness of air.  Plavix was stopped at that time plan to restart aspirin as outpatient.  GI workup showed hemorrhoids and polyps.  Pathology was also positive for inflammation and H. pylori.  Treated with 2 antibiotics and acid reducer.  Lipitor was held while on antibiotics.    Mr. Harley presents today with no complaints of chest pain, shortness of air or lightheadedness.  He has had no further bleeding.  Continues on Eliquis 5 mg twice daily.  EKG is stable.  Blood pressure well-controlled.   Patient is on Repatha and low-dose atorvastatin for his cholesterol.  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    Previous testing and notes have been reviewed by me.   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        Past Surgical History:   Procedure Laterality Date    ANKLE SURGERY Right     ORIF    CARDIAC CATHETERIZATION Left 7/21/2022    Procedure: Coronary Angiography;  Surgeon: Mayito Levy MD;  Location: [facility] CATH INVASIVE LOCATION;  Service: Cardiovascular;  Laterality: Left;    CARDIAC CATHETERIZATION N/A 7/21/2022    Procedure: Left Heart Cath;  Surgeon: Mayito Levy MD;  Location: [facility] CATH INVASIVE LOCATION;  Service: Cardiovascular;  Laterality: N/A;    CARDIAC CATHETERIZATION N/A 7/21/2022    Procedure: Stent SID coronary;  Surgeon: Mayito Levy MD;  Location: [facility] CATH INVASIVE LOCATION;  Service: Cardiovascular;  Laterality: N/A;    CARDIAC CATHETERIZATION N/A 7/21/2022    Procedure: Left ventriculography;  Surgeon: Mayito Levy MD;  Location: [facility] CATH INVASIVE LOCATION;  Service: Cardiovascular;  Laterality: N/A;    COLONOSCOPY N/A 2/6/2024    Procedure: COLONOSCOPY TO CECUM WITH COLD SNARE POLYPECTOMIES AND HOT SNARE POLYPECTOMIES WITH RESOLUTION CLIP PLACEMENT X4;  Surgeon: Dinora Montanez MD;  Location: [facility] ENDOSCOPY;  Service: Gastroenterology;  Laterality: N/A;  pre: RECTAL BLEEDING  post: POLYPS, DIVERTICULOSIS, HEMORRHOIDS    ENDOSCOPY N/A 2/6/2024    Procedure: ESOPHAGOGASTRODUODENOSCOPY WITH BIOPSIES;  Surgeon: Dinora Montanez MD;  Location: [facility] ENDOSCOPY;  Service: Gastroenterology;  Laterality: N/A;  pre: DYSPEPSIA, GERD  post: GASTRITIS, IRREGULAR Z-LINE    HEMORRHOIDECTOMY N/A 11/3/2021    Procedure: HEMORRHOID  BANDING;  Surgeon: Matthew Coronel MD;  [place] [facility] [place] [facility] [place]  Service: General;  Laterality: N/A;       Social History     Socioeconomic History    Marital status:    Tobacco Use    Smoking status: Former     Current packs/day: 0.00     Types: Cigarettes     Start date:      Quit date:      Years since quittin.2    Smokeless tobacco: Current     Types: Chew    Tobacco comments:     Caffeine - 2 daily//  chews tobacco is in process try to quit    Vaping Use    Vaping status: Never Used   Substance and Sexual Activity    Alcohol use: Not Currently     Comment: occ    Drug use: Yes     Frequency: 7.0 times per week     Types: Marijuana     Comment: daily/PAIN MGMT RX NARC CH BACK PAIN    Sexual activity: Defer       Family History   Problem Relation Age of Onset    Hypertension Mother     Coronary artery disease Mother         Mother with stent    Heart disease Father         Father with MI and 3 vessel CABG in upper 50's    Hypertension Father     Clotting disorder Father     Cancer Paternal Grandfather     Diabetes Maternal Uncle     Cancer Maternal Uncle     Malig Hyperthermia Neg Hx        Review of Systems   Constitutional: Negative.   HENT: Negative.     Eyes: Negative.    Cardiovascular: Negative.    Respiratory: Negative.     Endocrine: Negative.    Hematologic/Lymphatic:        Eliquis    Skin: Negative.    Musculoskeletal: Negative.    Gastrointestinal: Negative.    Genitourinary: Negative.    Neurological: Negative.    Psychiatric/Behavioral: Negative.     Allergic/Immunologic: Negative.        Allergies   Allergen Reactions    Wasp Venom Anaphylaxis    Wellbutrin [Bupropion] Palpitations     Palpitations and chest pain           Current Outpatient Medications:     apixaban (Eliquis) 5 MG tablet tablet, Take 1 tablet by mouth 2 (Two) Times a Day., Disp: 60 tablet, Rfl: 0    atorvastatin (LIPITOR) 20 MG tablet, Take 1 tablet by mouth Daily., Disp: 90 tablet, Rfl: 1    busPIRone  (BUSPAR) 10 MG tablet, Take 1 tablet by mouth Every 8 (Eight) Hours As Needed for anxiety., Disp: 90 tablet, Rfl: 3    Calcium Carb-Cholecalciferol (Calcium + D3) 600-800 MG-UNIT tablet, Take 1 tablet by mouth twice daily, Disp: 180 tablet, Rfl: 3    clarithromycin (BIAXIN) 500 MG tablet, Take 1 tablet by mouth 2 (Two) Times a Day., Disp: 28 tablet, Rfl: 0    desvenlafaxine (PRISTIQ) 50 MG 24 hr tablet, Take 1 tablet by mouth Daily., Disp: 30 tablet, Rfl: 0    Evolocumab (Repatha SureClick) solution auto-injector SureClick injection, Inject 1mL under the skin into the appropriate area as directed Every 14 (Fourteen) Days., Disp: 6 mL, Rfl: 1    HYDROcodone-acetaminophen (NORCO) 7.5-325 MG per tablet, Take 1 tablet by mouth Every 6 (Six) Hours As Needed for pain.  Max Daily Amount: 4 tablets, Disp: 100 tablet, Rfl: 0    hyoscyamine (ANASPAZ,LEVSIN) 0.125 MG tablet, Take 1 tablet by mouth Every 4 (Four) Hours As Needed for cramping for up to 10 days., Disp: 30 tablet, Rfl: 0    lisinopril (PRINIVIL,ZESTRIL) 10 MG tablet, Take 1 tablet by mouth daily., Disp: 90 tablet, Rfl: 1    methocarbamol (ROBAXIN) 500 MG tablet, Take 1-2 tablets by mouth Every 8 (Eight) Hours As Needed for spasm, Disp: 90 tablet, Rfl: 1    metoprolol succinate XL (TOPROL-XL) 25 MG 24 hr tablet, Take 1 tablet by mouth Every Morning., Disp: 90 tablet, Rfl: 1    pantoprazole (PROTONIX) 40 MG EC tablet, Take 1 tablet by mouth 2 (Two) Times a Day As Needed., Disp: 180 tablet, Rfl: 1    Semaglutide-Weight Management (Wegovy) 0.25 MG/0.5ML solution auto-injector, Inject 0.25MG under the skin into the appropriate area as directed Every 7 (Seven) Days., Disp: 2 mL, Rfl: 0    tadalafil (CIALIS) 20 MG tablet, Take 1/2-1 tablet by mouth 1 hour prior to sexual intercourse., Disp: 10 tablet, Rfl: 5    vitamin D (ERGOCALCIFEROL) 1.25 MG (10487 UT) capsule capsule, Take 1 capsule by mouth 1 (One) Time Per Week., Disp: 12 capsule, Rfl: 0    Vortioxetine HBr  "(Trintellix) 10 MG tablet tablet, Take 1 tablet by mouth Daily., Disp: 90 tablet, Rfl: 1    amoxicillin (AMOXIL) 500 MG capsule, Take 2 capsules by mouth 2 (Two) Times a Day. (Patient not taking: Reported on 3/12/2024), Disp: 56 capsule, Rfl: 0    aspirin 81 MG EC tablet, Take 1 tablet by mouth Daily., Disp: , Rfl:       Objective:     Vitals:    24 1445   BP: 128/82   Pulse: 68   Weight: 115 kg (253 lb 3.2 oz)   Height: 175.3 cm (69.02\")     Body mass index is 37.37 kg/m².     Left heart cath 2022:  LEFT VENTRICULOGRAPHY: The LV pressure was 100/16.  There was normal global LV systolic function ejection fraction is still 50% it appears that it is a little bit sluggish in the anterior lateral apical wall calcium is seen in the coronary arteries.  There was no mitral insufficiency or gradient across the aortic valve on pullback.     CORONARY ANGIOGRAPHY:  Left main: Normal  Left anterior descendin% luminal irregularities proximally 100% occluded in the midportion I do not actually see a stump there is a big collateral from a large diagonal to the apical LAD that retrograde fills it however there is a 60 to 70% lesion in that diagonal  Ramus intermedius:Not present  Circumflex: Very high rising OM1 100% occluded proximally the remainder of the circumflex is normal  RCA: Is a dominant vessel.  Diffuse 20 to 30% proximal disease in mid disease normal distally no collaterals    2D Echocardiogram 2022:  The following left ventricular wall segments are hypokinetic: basal anterolateral and mid anterolateral. New compared wtih prior echo  Left ventricular ejection fraction appears to be 51 - 55%.  Left ventricular diastolic function was normal.  No significant valvular disease.     Lexiscan stress test 2017:  Diaphragmatic attenuation and GI artifacts are present.  Myocardial perfusion imaging indicates a normal myocardial perfusion study with no evidence of ischemia.  Left ventricular ejection " fraction is normal (Calculated EF = 67%).  Impressions are consistent with a low risk study.    PHYSICAL EXAM:    Constitutional:       Appearance: Healthy appearance. Not in distress.   Neck:      Vascular: No JVR. JVD normal.   Pulmonary:      Effort: Pulmonary effort is normal.      Breath sounds: Normal breath sounds. No wheezing. No rhonchi. No rales.   Chest:      Chest wall: Not tender to palpatation.   Cardiovascular:      PMI at left midclavicular line. Normal rate. Regular rhythm. Normal S1. Normal S2.       Murmurs: There is no murmur.      No gallop.  No click. No rub.   Pulses:     Intact distal pulses.   Edema:     Peripheral edema absent.   Abdominal:      General: Bowel sounds are normal.      Palpations: Abdomen is soft.      Tenderness: There is no abdominal tenderness.   Musculoskeletal: Normal range of motion.         General: No tenderness. Skin:     General: Skin is warm and dry.   Neurological:      General: No focal deficit present.      Mental Status: Alert and oriented to person, place and time.           ECG 12 Lead    Date/Time: 3/12/2024 3:25 PM  Performed by: Jami Cevallos APRN    Authorized by: Jami Cevallos APRN  Comparison: compared with previous ECG from 9/28/2023  Rhythm: sinus rhythm  Rate: normal  BPM: 68  Conduction: conduction normal  ST Segments: ST segments normal  T Waves: T waves normal            Assessment:       Diagnosis Plan   1. Essential hypertension        2. Hyperlipidemia, unspecified hyperlipidemia type        3. Coronary artery disease involving native coronary artery of native heart without angina pectoris        4. Status post insertion of drug eluting coronary artery stent              No orders of the defined types were placed in this encounter.         Plan:       1.  Coronary artery disease: Status post PCI/SID to OM1 (07/2022).  LV function normal.  On DAPT with ASA/plavix, statin, Repatha and beta blocker.  Restart aspirin 81 mg in addition  to continuing Eliquis.  Patient does have chronic occlusion of the mid LAD, moderate to severe disease in diagonal.  He has no further episodes of bleeding.  2.  Hypertension: Well-controlled  3.  Hyperlipidemia: On low-dose statin therapy and Repatha.  Lipid panel followed by PCP    Mr. Harley will follow-up with Dr. Velasquez in 1 year.  He will call sooner for any questions or concerns.         Your medication list            Accurate as of March 12, 2024  3:24 PM. If you have any questions, ask your nurse or doctor.                START taking these medications        Instructions Last Dose Given Next Dose Due   aspirin 81 MG EC tablet  Started by: TAMELA Pablo      Take 1 tablet by mouth Daily.              CONTINUE taking these medications        Instructions Last Dose Given Next Dose Due   amoxicillin 500 MG capsule  Commonly known as: AMOXIL      Take 2 capsules by mouth 2 (Two) Times a Day.       atorvastatin 20 MG tablet  Commonly known as: LIPITOR      Take 1 tablet by mouth Daily.       busPIRone 10 MG tablet  Commonly known as: BUSPAR      Take 1 tablet by mouth Every 8 (Eight) Hours As Needed for anxiety.       Calcium+D3 600-800 MG-UNIT tablet  Generic drug: Calcium Carb-Cholecalciferol      Take 1 tablet by mouth twice daily       clarithromycin 500 MG tablet  Commonly known as: BIAXIN      Take 1 tablet by mouth 2 (Two) Times a Day.       desvenlafaxine 50 MG 24 hr tablet  Commonly known as: PRISTIQ      Take 1 tablet by mouth Daily.       Eliquis 5 MG tablet tablet  Generic drug: apixaban      Take 1 tablet by mouth 2 (Two) Times a Day.       HYDROcodone-acetaminophen 7.5-325 MG per tablet  Commonly known as: NORCO      Take 1 tablet by mouth Every 6 (Six) Hours As Needed for pain.  Max Daily Amount: 4 tablets       hyoscyamine 0.125 MG tablet  Commonly known as: ANASPAZ,LEVSIN      Take 1 tablet by mouth Every 4 (Four) Hours As Needed for cramping for up to 10 days.       lisinopril 10 MG  tablet  Commonly known as: PRINIVIL,ZESTRIL      Take 1 tablet by mouth daily.       methocarbamol 500 MG tablet  Commonly known as: ROBAXIN      Take 1-2 tablets by mouth Every 8 (Eight) Hours As Needed for spasm       metoprolol succinate XL 25 MG 24 hr tablet  Commonly known as: TOPROL-XL      Take 1 tablet by mouth Every Morning.       pantoprazole 40 MG EC tablet  Commonly known as: PROTONIX      Take 1 tablet by mouth 2 (Two) Times a Day As Needed.       Repatha SureClick solution auto-injector SureClick injection  Generic drug: Evolocumab      Inject 1mL under the skin into the appropriate area as directed Every 14 (Fourteen) Days.       tadalafil 20 MG tablet  Commonly known as: CIALIS      Take 1/2-1 tablet by mouth 1 hour prior to sexual intercourse.       Trintellix 10 MG tablet tablet  Generic drug: Vortioxetine HBr      Take 1 tablet by mouth Daily.       vitamin D 1.25 MG (22268 UT) capsule capsule  Commonly known as: ERGOCALCIFEROL      Take 1 capsule by mouth 1 (One) Time Per Week.       Wegovy 0.25 MG/0.5ML solution auto-injector  Generic drug: Semaglutide-Weight Management      Inject 0.25MG under the skin into the appropriate area as directed Every 7 (Seven) Days.                 Where to Get Your Medications        Information about where to get these medications is not yet available    Ask your nurse or doctor about these medications  aspirin 81 MG EC tablet           As always, it has been a pleasure to participate in your patient's care.      Sincerely,       TAMELA Bennett

## 2024-03-15 ENCOUNTER — TELEPHONE (OUTPATIENT)
Dept: GASTROENTEROLOGY | Facility: CLINIC | Age: 49
End: 2024-03-15
Payer: COMMERCIAL

## 2024-03-15 NOTE — TELEPHONE ENCOUNTER
----- Message from Dinora Montanez MD sent at 3/14/2024  7:32 PM EDT -----  H pylori breath test was normal

## 2024-12-30 ENCOUNTER — SPECIALTY PHARMACY (OUTPATIENT)
Dept: PHARMACY | Facility: TELEHEALTH | Age: 49
End: 2024-12-30
Payer: COMMERCIAL

## 2024-12-30 NOTE — PROGRESS NOTES
Specialty Pharmacy Patient Management Program  Initial Assessment     Heber Harley is a 49 y.o. male with hyperlipidemia and enrolled in the Patient Management program offered by [facility]. An initial outreach was conducted, including assessment of therapy appropriateness and specialty medication education for Repatha. The patient was introduced to services offered by [facility], including: regular assessments, refill coordination, curbside pick-up or mail order delivery options, prior authorization maintenance, and financial assistance programs as applicable. The patient was also provided with contact information for the pharmacy team.     Insurance Coverage & Financial Support  [employer] + copay card      Relevant Past Medical History and Comorbidities  Relevant medical history and concomitant health conditions were discussed with the patient. The patient's chart has been reviewed for relevant past medical history and comorbid health conditions and updated as necessary.   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      Social History     Socioeconomic History    Marital status:    Tobacco Use    Smoking status: Former     Current packs/day: 0.00     Types: Cigarettes     Start date: 2010     Quit date: 2015     Years since quitting: 10.0    Smokeless tobacco: Current     Types: Chew    Tobacco comments:     Caffeine - 2 daily//  chews tobacco is in process try to quit    Vaping Use    Vaping status: Never Used   Substance and Sexual Activity    Alcohol use: Not Currently     Comment: occ    Drug use: Yes     Frequency: 7.0 times per week     Types: Marijuana      Comment: daily/PAIN MGMT RX [facility] BACK PAIN    Sexual activity: Defer     Problem list reviewed by Jeannine Aviles RPH on 12/30/2024 at 12:34 PM    Allergies  Known allergies and reactions were discussed with the patient. The patient's chart has been reviewed for allergy information and updated as necessary.   Wasp venom and Wellbutrin [bupropion]  Allergies reviewed by Jeannine Aviles RPH on 12/30/2024 at 12:34 PM    Current Medication List  This medication list has been reviewed with the patient and evaluated for any interactions or necessary modifications/recommendations, and updated to include all prescription medications, OTC medications, and supplements the patient is currently taking. This list reflects what is contained in the patient's profile, which has also been marked as reviewed to communicate to other providers it is the most up to date version of the patient's current medication therapy.     Current Outpatient Medications:     amoxicillin (AMOXIL) 500 MG capsule, Take 2 capsules by mouth 2 (Two) Times a Day. (Patient not taking: Reported on 3/12/2024), Disp: 56 capsule, Rfl: 0    apixaban (Eliquis) 5 MG tablet tablet, Take 1 tablet by mouth 2 (Two) Times a Day., Disp: 60 tablet, Rfl: 0    aspirin 81 MG EC tablet, Take 1 tablet by mouth Daily., Disp: , Rfl:     atorvastatin (LIPITOR) 20 MG tablet, Take 1 tablet by mouth Daily., Disp: 90 tablet, Rfl: 0    busPIRone (BUSPAR) 10 MG tablet, Take 1 tablet by mouth Every 8 (Eight) Hours As Needed for anxiety., Disp: 90 tablet, Rfl: 3    busPIRone (BUSPAR) 10 MG tablet, Take 1 tablet by mouth Every 8 (Eight) Hours As Needed for anxiety, Disp: 90 tablet, Rfl: 3    clopidogrel (PLAVIX) 75 MG tablet, Take 1 tablet by mouth Daily., Disp: 90 tablet, Rfl: 1    desvenlafaxine (PRISTIQ) 50 MG 24 hr tablet, Take 1 tablet by mouth Daily., Disp: 90 tablet, Rfl: 1    dicyclomine (BENTYL) 10 MG capsule, Take 1 capsule by mouth 4 (Four) Times a Day Before Meals & nightly,  Disp: 30 capsule, Rfl: 0    ergocalciferol (ERGOCALCIFEROL) 1.25 MG (40352 UT) capsule, Take 1 capsule by mouth 1 (One) Time Per Week., Disp: 12 capsule, Rfl: 1    Evolocumab (Repatha SureClick) solution auto-injector SureClick injection, Inject 1mL under the skin into the appropriate area as directed Every 14 (Fourteen) Days., Disp: 6 mL, Rfl: 1    Evolocumab (Repatha SureClick) solution auto-injector SureClick injection, Inject 1 mL under the skin into the appropriate area as directed Every 14 (Fourteen) Days., Disp: 6 mL, Rfl: 1    HYDROcodone-acetaminophen (NORCO) 7.5-325 MG per tablet, Take 1 tablet by mouth Every 6 (Six) Hours As Needed for pain. Max Daily Amount: 4 tablets, Disp: 100 tablet, Rfl: 0    HYDROcodone-acetaminophen (NORCO) 7.5-325 MG per tablet, Take 1 tablet by mouth Every 6 (Six) Hours As Needed., Disp: 100 tablet, Rfl: 0    lisinopril (PRINIVIL,ZESTRIL) 10 MG tablet, Take 1 tablet by mouth Daily., Disp: 90 tablet, Rfl: 1    methocarbamol (ROBAXIN) 500 MG tablet, Take 1-2 tablets by mouth Every 8 (Eight) Hours As Needed for spasm, Disp: 90 tablet, Rfl: 1    metoprolol succinate XL (TOPROL-XL) 25 MG 24 hr tablet, Take 1 tablet by mouth Every Morning., Disp: 90 tablet, Rfl: 1    pantoprazole (PROTONIX) 40 MG EC tablet, Take 1 tablet by mouth 2 (Two) Times a Day As Needed., Disp: 180 tablet, Rfl: 1    Semaglutide-Weight Management (Wegovy) 2.4 MG/0.75ML solution auto-injector, Inject 2.4mg under the skin into the appropriate area as directed 1 (One) Time Per Week., Disp: 9 mL, Rfl: 0    tadalafil (CIALIS) 20 MG tablet, Take 1/2-1 tablet by mouth 1 hour prior to sexual intercourse., Disp: 10 tablet, Rfl: 5    tadalafil (CIALIS) 20 MG tablet, Take 1/2 - 1 tablet by mouth 1 hour prior to sexual intercourse., Disp: 10 tablet, Rfl: 5  Medicines reviewed by Jeannine Aviles RP on 12/30/2024 at 12:34 PM    Drug Interactions  none     Relevant Laboratory Values  Lab Results   Component Value Date    GLUCOSE  107 (H) 02/01/2024    CALCIUM 9.3 02/01/2024     02/01/2024    K 4.1 02/01/2024    CO2 24.0 02/01/2024     02/01/2024    BUN 10 02/01/2024    CREATININE 0.80 02/01/2024    BCR 12.5 02/01/2024    ANIONGAP 11.0 02/01/2024     Lab Results   Component Value Date    WBC 7.49 02/01/2024    HGB 16.0 02/01/2024    HGB 16.0 02/01/2024    HCT 48.2 02/01/2024    HCT 48.2 02/01/2024    MCV 87.6 02/01/2024     02/01/2024    INR 1.09 01/30/2024     Lab Value Review  The above lab values have been reviewed; the following specialty medication(s) dose adjustment(s) are recommended: none.    Initial Education Provided for Specialty Medication  The patient has been provided with the following education and any applicable administration techniques (i.e. self-injection) have been demonstrated for the therapies indicated. All questions and concerns have been addressed prior to the patient receiving the medication, and the patient has verbalized understanding of the education and any materials provided. Additional patient education shall be provided and documented upon request by the patient, provider or payer.      Repatha® (evolocumab)    Medication Expectations   Why am I taking this medication? You are taking Repatha to lower your “bad” cholesterol (LDL-C). This medication can be used in adults with high blood cholesterol including primary hyperlipidemia and familial hypercholesterolemia. Repatha can also be used to reduce the risk of heart attack, stroke, and certain chest pain conditions requiring hospitalization if you have a history of cardiovascular disease.     What should I expect while on this medication? You should expect to see your cholesterol improve over time. Specifically, you should see your LDL-C decrease.    How does the medication work? Repatha works by blocking a protein called PCSK9 that contributes to high levels of bad cholesterol and it helps increase your liver's ability to remove bad  cholesterol from your blood.     How long will I be on this medication for? The amount of time you will be on this medication will be determined by your doctor based on your cholesterol and/or your risk of having a cardiac event. You will most likely be on this medication or another cholesterol medication throughout your lifetime. Do not abruptly stop this medication without talking to your doctor first.    How do I take this medication? Take as directed on your prescription label. Repatha is injected under the skin (subcutaneously) of your stomach, thigh, or upper arm. This medication is usually given one or twice a month. Use a different injection site in the same body region with each dose.    What are some possible side effects? The most common side effects of Repatha include redness, itching, swelling, or pain/tenderness at the injection site, symptoms of the common cold, and flu or flu-like symptoms.    What happens if I miss a dose? If a dose is missed, and it is within 7 days from the missed dose, administer evolocumab and resume the original dosing schedule. If it has been more than 7 days from the missed dose, for every 2 week dosing, wait until the next dose on the original schedule and, for once monthly dosing, administer the dose and start a new schedule based on this date.     Medication Safety   What are things I should warn my doctor immediately about? Talk to your doctor if you are pregnant, planning to become pregnant, or breastfeeding. Stop the medication and tell your doctor or seek emergency medical help if you notice any signs/symptoms of an allergic reaction (severe rash, redness, hives, severe itching, trouble breathing, or swelling of the face, lips, or tongue). Do not take Repatha if you have an allergy to evolocumab or any of the ingredients in Repatha.    What are things that I should be cautious of? Be cautious of any side effects from this medication. Talk to your doctor if any new ones  develop or aren't getting better.   What are some medications that can interact with this one? There are no known significant drug interactions with Repatha. Always tell your doctor or pharmacist immediately if you start taking any new medications, including over-the-counter medications, vitamins, and herbal supplements.      Medication Storage/Handling   How should I handle this medication? Do not shake or expose the pen to extreme heat or direct sunlight. Keep this medication out of reach of pets/children.    How does this medication need to be stored? Store unused pens in the refrigerator in the original carton to protect from light. Allow medication to warm at room temperature prior to administration. If needed, Repatha may be kept at room temperature in the original carton for up to 30 days. Do not freeze.    How should I dispose of this medication? The device is a single-dose disposable pen and should be discarded in a sharps container after use. If you do not own a sharps container, you may use a household container made of heavy-duty plastic with a tight-fitting lid that is leak resistant (e.g., heavty-duty plastic laundry detergent bottle).  If your doctor decides to stop this medication, take to your local police station for proper disposal. Some pharmacies also have take-back bins for medication drop-off.      Resources/Support   How can I remind myself to take this medication? You can download reminder apps to help you manage your refills. You may also set an alarm on your phone to remind you to take your dose.    Is financial support available?  [employer] can provide co-pay cards if you have commercial insurance or patient assistance if you have Medicare or no insurance.    Which vaccines are recommended for me? Talk to your doctor about these vaccines: Flu, Coronavirus (COVID-19), Pneumococcal (pneumonia), Tdap, Zoster (shingles)          Adherence, Self-Administration, and Current Therapy  Problems  Adherence related to the patient's specialty therapy was discussed with the patient. The Adherence segment of this outreach has been reviewed and updated.          Additional Barriers to Patient Self-Administration: None identified  Methods for Supporting Patient Self-Administration: n/a    Open Medication Therapy Problems  No medication therapy recommendations to display    Goals of Therapy   Goals Addressed Today        Specialty Pharmacy General Goal      LDL Goal < 70 mg/dL    Lab Results   Component Value Date    LDL 63 07/22/2022    LDL 54 07/21/2022    LDL 79 09/11/2015                     Reassessment Plan & Follow-Up  Medication Therapy Changes: Patient is continuing Repatha, beginning services through [facility]  Additional Plans, Therapy Recommendations, or Therapy Problems to Be Addressed: none   Pharmacist to perform regular reassessments no more than (6) months from the previous assessment.  Welcome information and patient satisfaction survey to be sent by retail team with patient's initial fill.  Care Coordinator to set up future refill outreaches, coordinate prescription delivery, and escalate clinical questions to pharmacist.     Attestation  I attest the patient was actively involved in and has agreed to the above plan of care. I attest that the initiated specialty medication(s) are appropriate for the patient based on my assessment. If the prescribed therapy is at any point deemed not appropriate based on the current or future assessments, a consultation will be initiated with the patient's specialty care provider to determine the best course of action. The revised plan of therapy will be documented along with any reassessments and/or additional patient education provided.     Electronically signed by Jeannine Aviles RPH, 12/30/24, 12:35 PM EST.

## 2025-03-21 ENCOUNTER — SPECIALTY PHARMACY (OUTPATIENT)
Dept: PHARMACY | Facility: TELEHEALTH | Age: 50
End: 2025-03-21
Payer: COMMERCIAL

## 2025-03-21 NOTE — PROGRESS NOTES
Specialty Pharmacy Patient Management Program  Refill Outreach     Heber was contacted today regarding refills of their medication(s).    Refill Questions      Flowsheet Row Most Recent Value   Changes to allergies? No   Changes to medications? No   New conditions or infections since last clinic visit No   Unplanned office visit, urgent care, ED, or hospital admission in the last 4 weeks  No   How does patient/caregiver feel medication is working? Very good   Financial problems or insurance changes  No   Since the previous refill, were any specialty medication doses or scheduled injections missed or delayed?  No   Does this patient require a clinical escalation to a pharmacist? No            Delivery Questions      Flowsheet Row Most Recent Value   Delivery method [employer]   Delivery address verified with patient/caregiver? Yes   Delivery address Home   Number of medications in delivery 4   Medication(s) being filled and delivered Evolocumab (Repatha SureClick), Atorvastatin Calcium (LIPITOR), Metoprolol Succinate (TOPROL-XL), Pantoprazole Sodium (PROTONIX)   Doses left of specialty medications 1-injecting on 3/28   Copay verified? Yes   Copay amount $45 for repatha $25.80 for pantoprazole   Copay form of payment Credit/debit on file   Delivery Date Selection 03/25/25   Signature Required No                 Follow-up: 70 day(s)     Sulma Castellano, Pharmacy Technician  3/21/2025  11:17 EDT

## 2025-08-27 ENCOUNTER — SPECIALTY PHARMACY (OUTPATIENT)
Dept: PHARMACY | Facility: TELEHEALTH | Age: 50
End: 2025-08-27
Payer: COMMERCIAL

## (undated) DEVICE — KT MANIFLD CARDIAC

## (undated) DEVICE — GLOVE SURGEON ORTHOPEDIC 8

## (undated) DEVICE — SKIN PREP TRAY W/CHG: Brand: MEDLINE INDUSTRIES, INC.

## (undated) DEVICE — STERILE POLYISOPRENE POWDER-FREE SURGICAL GLOVES WITH EMOLLIENT COATING: Brand: PROTEXIS

## (undated) DEVICE — BLCK/BITE BLOX W/DENTL/RIM W/STRAP 54F

## (undated) DEVICE — RNG O HMROID LF PK/100

## (undated) DEVICE — LN SMPL CO2 SHTRM SD STREAM W/M LUER

## (undated) DEVICE — CATH DIAG IMPULSE FR4 5F 100CM

## (undated) DEVICE — Device: Brand: TWIN-PASS® DUAL ACCESS CATHETER

## (undated) DEVICE — PK CATH CARD 40

## (undated) DEVICE — SENSR O2 OXIMAX FNGR A/ 18IN NONSTR

## (undated) DEVICE — LAG PERI GYN: Brand: MEDLINE INDUSTRIES, INC.

## (undated) DEVICE — STRAP STIRUP SLP RNG 19X3.5IN DISP

## (undated) DEVICE — HI-TORQUE WHISPER MS GUIDE WIRE .014 STRAIGHT TIP 3.0 CM X 190 CM: Brand: HI-TORQUE WHISPER

## (undated) DEVICE — CATH DIAG IMPULSE FL3.5 5F 100CM

## (undated) DEVICE — TRAP POLYP ETRAP 2PK

## (undated) DEVICE — MINOR-LF: Brand: MEDLINE INDUSTRIES, INC.

## (undated) DEVICE — LEGGINGS, PAIR, 31X48, STERILE: Brand: MEDLINE

## (undated) DEVICE — GUIDE CATHETER: Brand: MACH1™

## (undated) DEVICE — DEV INDEFLATOR P/N 580289

## (undated) DEVICE — CATH VENT MIV RADL PIG ST TIP 4F 110CM

## (undated) DEVICE — TREK CORONARY DILATATION CATHETER 3.0 MM X 20 MM / RAPID-EXCHANGE: Brand: TREK

## (undated) DEVICE — FRCP BX RADJAW4 NDL 2.8 240CM LG OG BX80

## (undated) DEVICE — ADAPT CLN BIOGUARD AIR/H2O DISP

## (undated) DEVICE — TBG SXN CONN/F UNIV 1/4IN 10FT LF STRL

## (undated) DEVICE — MSK PROC CURAPLEX O2 2/ADAPT 7FT

## (undated) DEVICE — GW EMR FIX EXCHG J STD .035 3MM 260CM

## (undated) DEVICE — DRAPE UNDERBUTTOCKS W FLUID POUCH 40X44IN

## (undated) DEVICE — SNAR POLYP SENSATION STDOVL 27 240 BX40

## (undated) DEVICE — GLIDESHEATH BASIC HYDROPHILIC COATED INTRODUCER SHEATH: Brand: GLIDESHEATH

## (undated) DEVICE — GOWN,REINFRCE,POLY,SIRUS,BREATH SLV,XXLG: Brand: MEDLINE

## (undated) DEVICE — KT ORCA ORCAPOD DISP STRL

## (undated) DEVICE — NC TREK CORONARY DILATATION CATHETER 3.0 MM X 20 MM / RAPID-EXCHANGE: Brand: NC TREK